# Patient Record
Sex: FEMALE | Race: WHITE | NOT HISPANIC OR LATINO | Employment: UNEMPLOYED | URBAN - METROPOLITAN AREA
[De-identification: names, ages, dates, MRNs, and addresses within clinical notes are randomized per-mention and may not be internally consistent; named-entity substitution may affect disease eponyms.]

---

## 2017-01-24 ENCOUNTER — PATIENT MESSAGE (OUTPATIENT)
Dept: FAMILY MEDICINE | Facility: CLINIC | Age: 37
End: 2017-01-24

## 2017-01-25 RX ORDER — OSELTAMIVIR PHOSPHATE 75 MG/1
75 CAPSULE ORAL DAILY
Qty: 10 CAPSULE | Refills: 0 | Status: SHIPPED | OUTPATIENT
Start: 2017-01-25 | End: 2017-02-04

## 2017-02-01 ENCOUNTER — LAB VISIT (OUTPATIENT)
Dept: LAB | Facility: HOSPITAL | Age: 37
End: 2017-02-01
Attending: INTERNAL MEDICINE
Payer: MEDICAID

## 2017-02-01 ENCOUNTER — OFFICE VISIT (OUTPATIENT)
Dept: RHEUMATOLOGY | Facility: CLINIC | Age: 37
End: 2017-02-01
Payer: MEDICAID

## 2017-02-01 VITALS
HEIGHT: 62 IN | HEART RATE: 64 BPM | DIASTOLIC BLOOD PRESSURE: 70 MMHG | WEIGHT: 97.88 LBS | BODY MASS INDEX: 18.01 KG/M2 | SYSTOLIC BLOOD PRESSURE: 100 MMHG

## 2017-02-01 DIAGNOSIS — E55.9 VITAMIN D DEFICIENCY: ICD-10-CM

## 2017-02-01 DIAGNOSIS — G93.32 CHRONIC FATIGUE SYNDROME: ICD-10-CM

## 2017-02-01 DIAGNOSIS — M79.7 FIBROMYALGIA: Primary | ICD-10-CM

## 2017-02-01 DIAGNOSIS — F32.A DEPRESSION, UNSPECIFIED DEPRESSION TYPE: ICD-10-CM

## 2017-02-01 LAB — 25(OH)D3+25(OH)D2 SERPL-MCNC: 32 NG/ML

## 2017-02-01 PROCEDURE — 99213 OFFICE O/P EST LOW 20 MIN: CPT | Mod: PBBFAC,PO | Performed by: INTERNAL MEDICINE

## 2017-02-01 PROCEDURE — 99214 OFFICE O/P EST MOD 30 MIN: CPT | Mod: S$PBB,,, | Performed by: INTERNAL MEDICINE

## 2017-02-01 PROCEDURE — 82306 VITAMIN D 25 HYDROXY: CPT

## 2017-02-01 PROCEDURE — 36415 COLL VENOUS BLD VENIPUNCTURE: CPT | Mod: PO

## 2017-02-01 PROCEDURE — 99999 PR PBB SHADOW E&M-EST. PATIENT-LVL III: CPT | Mod: PBBFAC,,, | Performed by: INTERNAL MEDICINE

## 2017-02-01 RX ORDER — ALPRAZOLAM 0.5 MG/1
TABLET ORAL 2 TIMES DAILY PRN
COMMUNITY
Start: 2017-01-31 | End: 2019-07-17

## 2017-02-01 RX ORDER — DEXTROAMPHETAMINE SACCHARATE, AMPHETAMINE ASPARTATE MONOHYDRATE, DEXTROAMPHETAMINE SULFATE AND AMPHETAMINE SULFATE 7.5; 7.5; 7.5; 7.5 MG/1; MG/1; MG/1; MG/1
30 CAPSULE, EXTENDED RELEASE ORAL EVERY MORNING
COMMUNITY
End: 2021-02-25

## 2017-02-01 RX ORDER — CLONAZEPAM 2 MG/1
TABLET ORAL NIGHTLY
COMMUNITY
Start: 2017-01-31 | End: 2018-04-23

## 2017-02-01 RX ORDER — CHOLECALCIFEROL (VITAMIN D3) 25 MCG
185 TABLET ORAL DAILY
COMMUNITY
End: 2017-12-12

## 2017-02-01 RX ORDER — METHOCARBAMOL 500 MG/1
500 TABLET, FILM COATED ORAL 2 TIMES DAILY PRN
Qty: 60 TABLET | Refills: 11 | Status: SHIPPED | OUTPATIENT
Start: 2017-02-01 | End: 2017-04-13

## 2017-02-01 RX ORDER — KETOROLAC TROMETHAMINE 10 MG/1
10 TABLET, FILM COATED ORAL DAILY PRN
Qty: 5 TABLET | Refills: 11 | Status: SHIPPED | OUTPATIENT
Start: 2017-02-01 | End: 2018-02-22 | Stop reason: SDUPTHER

## 2017-02-01 ASSESSMENT — ROUTINE ASSESSMENT OF PATIENT INDEX DATA (RAPID3)
PAIN SCORE: 7.5
TOTAL RAPID3 SCORE: 6.05
PATIENT GLOBAL ASSESSMENT SCORE: 7
MDHAQ FUNCTION SCORE: 1.1
PSYCHOLOGICAL DISTRESS SCORE: 4.4

## 2017-02-01 NOTE — PROGRESS NOTES
"Subjective:          Chief Complaint: Dorothy Jesus is a 36 y.o. female who had concerns including Disease Management.    HPI:  Patient having flare in past few months with incresased pain in upper trapezius and c-spine, fingers in pain. Hurts and exhausted just taking a shower. Too painful to make food.   Trouble with sleep secondary to pain. Sleeps ok once she is asleep, but not sleeping until 0200. Stiff when she gets up.       Tizanidine does take the "edge" off her myalgias. Not typically using every day, but as of late. On a good day 30% help, on average 10%,Ketoprofen not helping as much for aches as once did.       She has increased her activity level trying to eat healthier.  In the past she states she was getting some relief of myalgias and arthralgias on ketoprofen.  He completed the vitamin D 12 weeks her psychiatrist has recently adjusted her medications slightly and this seems to be helping a great deal.  Interestingly she noted with the adjustment in her ADHD meds her myalgias have improved.Patient is a 34 y/o female referred for +LENA  1:80 homogeneous with repeat serologies all negative with myalgias, brain fog. Present for few years worse in past 2 years, affecting daily living/function. Patient did suffer from worsening of her symptoms after trying gabapentin, she did increase dose as we discussed but this only exacerbated her complaints. She did have worseing depression through the winter. Recent job did not work out for her, which was frustrating.  did recently get full time job.    Exacerbates with menstrual cycles. Patient has a non-restorative sleep pattern, no snoring. She has had sleep study-[place] negative for narcoplepsy. Patient was given Provigil exacerbated anxiety. Ibuprofen 600mg no help. APAP no help, naproxen no help.     Patient was on lamictal for nearly 8 years d/c'd for suspected myalgias. Recently (few weeks ago) trialed lithium did not tolerate. Zyprexa, " "Geodon, topamax and risperdol. Wellbutrin has been the best medication for her as she continues to feel "human". She is having more myalgias, and some arthralgias some good days some bad days. She cannot see a pattern of morning stiffness.       REVIEW OF SYSTEMS:    Review of Systems   Constitutional: Positive for malaise/fatigue. Negative for fever and weight loss.   HENT: Negative for sore throat.    Eyes: Negative for double vision, photophobia and redness.   Respiratory: Negative for cough, shortness of breath and wheezing.    Cardiovascular: Negative for chest pain, palpitations and orthopnea.   Gastrointestinal: Negative for abdominal pain, constipation and diarrhea.   Genitourinary: Negative for dysuria, hematuria and urgency.   Musculoskeletal: Positive for joint pain and neck pain. Negative for back pain and myalgias.   Skin: Negative for rash.   Neurological: Negative for dizziness, tingling, focal weakness and headaches.   Endo/Heme/Allergies: Does not bruise/bleed easily.   Psychiatric/Behavioral: Negative for depression, hallucinations and suicidal ideas.               Objective:            Past Medical History   Diagnosis Date    Anxiety     Bartholin's cyst     Bipolar 2 disorder     Chronic fatigue     Osteopenia      History reviewed. No pertinent family history.  Social History   Substance Use Topics    Smoking status: Never Smoker    Smokeless tobacco: None    Alcohol use No      Comment: rare         Current Outpatient Prescriptions on File Prior to Visit   Medication Sig Dispense Refill    alprazolam (XANAX) 0.5 MG tablet Take 0.5 mg by mouth daily as needed for Anxiety.      buPROPion (WELLBUTRIN XL) 300 MG 24 hr tablet Take 150 mg by mouth once daily.       dextroamphetamine-amphetamine (ADDERALL) 20 mg tablet Take 1 tablet by mouth 2 (two) times daily. Takes at one o'clock in afternoon. Takes 30 mg in morning.      ketoprofen (ORUDIS) 50 MG capsule Take 1 capsule (50 mg total) by " mouth 2 (two) times daily as needed for Pain. 60 capsule 6    [DISCONTINUED] clonazepam (KLONOPIN) 1 MG tablet Take 1 tablet (1 mg total) by mouth 2 (two) times daily as needed. (Patient taking differently: Take 2 mg by mouth every evening. ) 60 tablet 3    cyanocobalamin (VITAMIN B-12) 1,000 mcg/mL injection Inject 1 mL (1,000 mcg total) into the muscle every 28 days. 12 mL 0    ibuprofen (ADVIL,MOTRIN) 200 MG tablet Take 200 mg by mouth every 6 (six) hours as needed for Pain.      magnesium oxide (MAG-OX) 400 mg tablet Take 400 mg by mouth once daily.      oseltamivir (TAMIFLU) 75 MG capsule Take 1 capsule (75 mg total) by mouth once daily. 10 capsule 0    syringe with needle 1 mL 27 gauge Syrg For B12 injections 12 Syringe 2    tizanidine (ZANAFLEX) 2 MG tablet Take 2 tablets (4 mg total) by mouth every 6 (six) hours as needed. 1-2 tablets by mouth every 6 hours. 60 tablet 5     No current facility-administered medications on file prior to visit.        Vitals:    02/01/17 1409   BP: 100/70   Pulse: 64       Physical Exam:    Physical Exam   Constitutional: She appears well-developed and well-nourished.   HENT:   Nose: No septal deviation.   Mouth/Throat: Mucous membranes are normal. No oral lesions.   Eyes: Pupils are equal, round, and reactive to light. Right conjunctiva is not injected. Left conjunctiva is not injected.   Neck: No JVD present. No thyroid mass and no thyromegaly present.   Cardiovascular: Normal rate, regular rhythm and normal pulses.    No edema   Pulmonary/Chest: Effort normal and breath sounds normal.   Abdominal: Soft. Normal appearance. There is no hepatosplenomegaly.   Musculoskeletal:        Right shoulder: She exhibits normal range of motion, no tenderness and no swelling.        Left shoulder: She exhibits normal range of motion, no tenderness and no swelling.        Right elbow: She exhibits normal range of motion and no swelling. No tenderness found.        Left elbow: She  exhibits normal range of motion and no swelling. No tenderness found.        Right wrist: She exhibits normal range of motion, no tenderness and no swelling.        Left wrist: She exhibits normal range of motion, no tenderness and no swelling.        Right hip: She exhibits normal range of motion, normal strength and no swelling.        Left hip: She exhibits normal range of motion, no tenderness and no swelling.        Right knee: She exhibits normal range of motion and no swelling. No tenderness found.        Left knee: She exhibits normal range of motion and no swelling. No tenderness found.        Right ankle: She exhibits normal range of motion and no swelling. No tenderness.        Left ankle: She exhibits normal range of motion and no swelling. No tenderness.   No synovitis of the MCP, PIP, DIP or wrist    18/18 FMS tender points   Lymphadenopathy:     She has no cervical adenopathy.     She has no axillary adenopathy.   Neurological: She has normal strength and normal reflexes.   Skin: Skin is dry and intact.   Psychiatric: She has a normal mood and affect.             Assessment:       Encounter Diagnoses   Name Primary?    Fibromyalgia Yes    Vitamin D deficiency     Chronic fatigue syndrome     Depression, unspecified depression type           Plan:        Fibromyalgia  -     ketorolac (TORADOL) 10 mg tablet; Take 1 tablet (10 mg total) by mouth daily as needed for Pain.  Dispense: 5 tablet; Refill: 11  -     methocarbamol (ROBAXIN) 500 MG Tab; Take 1 tablet (500 mg total) by mouth 2 (two) times daily as needed.  Dispense: 60 tablet; Refill: 11    Vitamin D deficiency  -     Vitamin D; Future; Expected date: 2/1/17    Chronic fatigue syndrome    Depression, unspecified depression type    Trial with changing Tizanidine to Robaxin PRN  Also want to see if Toradol PRN extreme flares offers any benefit.   INcreased psychological stressors seem to be affecting overall physical functions, patients  continues to remain as active as possible.   Check vitamin D.   Return in about 3 months (around 5/1/2017).          30min consultation with greater than 50% spent in counseling, chart review and coordination of care. All questions answered.

## 2017-02-01 NOTE — MR AVS SNAPSHOT
[facility]  [address] [facility] [address]  [facility] [address]  Phone: [phone]  Fax: [phone]                  Dorothy Jesus   2017 2:00 PM   Office Visit    Description:  Female : 1980   Provider:  Glory St DO   Department:  [facility]           Reason for Visit     Disease Management           Diagnoses this Visit        Comments    Fibromyalgia    -  Primary     Vitamin D deficiency         Chronic fatigue syndrome         Depression, unspecified depression type                To Do List           Future Appointments        Provider Department Dept Phone    2017 3:05 PM LAB, [place] [facility] [phone]    2/3/2017 10:20 AM Tamra Stack MD [facility] - [phone]    2017 9:40 AM HARMEET Nguyen MD [facility] [phone]    2017 2:00 PM Glory St DO [facility] [phone]      Goals (5 Years of Data)     None      Follow-Up and Disposition     Return in about 3 months (around 2017).       These Medications        Disp Refills Start End    ketorolac (TORADOL) 10 mg tablet 5 tablet 11 2017    Take 1 tablet (10 mg total) by mouth daily as needed for Pain. - Oral    [place] [facility] [place] [address] [facility] [address] Ph #: [phone]       methocarbamol (ROBAXIN) 500 MG Tab 60 tablet 11 2017    Take 1 tablet (500 mg total) by mouth 2 (two) times daily as needed. - Oral    [place] [facility] [place] [address] [facility] Ph #: [phone]         [facility] On Call     [facility] On Call Nurse Care Line -  Assistance  Registered nurses in the [facility] provide clinical advisement, health education, appointment booking, and other advisory services.  Call for this free service at [phone].             Medications           Message regarding Medications     Verify the  changes and/or additions to your medication regime listed below are the same as discussed with your clinician today.  If any of these changes or additions are incorrect, please notify your healthcare provider.        START taking these NEW medications        Refills    ketorolac (TORADOL) 10 mg tablet 11    Sig: Take 1 tablet (10 mg total) by mouth daily as needed for Pain.    Class: Normal    Route: Oral    methocarbamol (ROBAXIN) 500 MG Tab 11    Sig: Take 1 tablet (500 mg total) by mouth 2 (two) times daily as needed.    Class: Normal    Route: Oral      STOP taking these medications     FAMVIR 125 mg Tab     ibuprofen (ADVIL,MOTRIN) 200 MG tablet Take 200 mg by mouth every 6 (six) hours as needed for Pain.           Verify that the below list of medications is an accurate representation of the medications you are currently taking.  If none reported, the list may be blank. If incorrect, please contact your healthcare provider. Carry this list with you in case of emergency.           Current Medications     alprazolam (XANAX) 0.5 MG tablet Take 0.5 mg by mouth daily as needed for Anxiety.    alprazolam (XANAX) 1 MG tablet     buPROPion (WELLBUTRIN XL) 300 MG 24 hr tablet Take 150 mg by mouth once daily.     clonazePAM (KLONOPIN) 2 MG Tab     dextroamphetamine-amphetamine (ADDERALL XR) 30 MG 24 hr capsule Take 30 mg by mouth every morning.    dextroamphetamine-amphetamine (ADDERALL) 20 mg tablet Take 1 tablet by mouth 2 (two) times daily. Takes at one o'clock in afternoon. Takes 30 mg in morning.    ketoprofen (ORUDIS) 50 MG capsule Take 1 capsule (50 mg total) by mouth 2 (two) times daily as needed for Pain.    vitamin D 1000 units Tab Take 185 mg by mouth once daily.    cyanocobalamin (VITAMIN B-12) 1,000 mcg/mL injection Inject 1 mL (1,000 mcg total) into the muscle every 28 days.    ketorolac (TORADOL) 10 mg tablet Take 1 tablet (10 mg total) by mouth daily as needed for Pain.    magnesium oxide (MAG-OX) 400 mg  "tablet Take 400 mg by mouth once daily.    methocarbamol (ROBAXIN) 500 MG Tab Take 1 tablet (500 mg total) by mouth 2 (two) times daily as needed.    oseltamivir (TAMIFLU) 75 MG capsule Take 1 capsule (75 mg total) by mouth once daily.    syringe with needle 1 mL 27 gauge Syrg For B12 injections    tizanidine (ZANAFLEX) 2 MG tablet Take 2 tablets (4 mg total) by mouth every 6 (six) hours as needed. 1-2 tablets by mouth every 6 hours.           Clinical Reference Information           Vital Signs - Last Recorded  Most recent update: 2/1/2017  2:13 PM by Pati Pollard LPN    BP Pulse Ht Wt BMI    100/70 64 5' 2" (1.575 m) 44.4 kg (97 lb 14.2 oz) 17.9 kg/m2      Blood Pressure          Most Recent Value    BP  100/70      Allergies as of 2/1/2017     Gabapentin    Lithium Analogues    Nortriptyline    Topamax [Topiramate]      Immunizations Administered on Date of Encounter - 2/1/2017     None      Orders Placed During Today's Visit     Future Labs/Procedures Expected by Expires    Vitamin D  2/1/2017 4/2/2018      "

## 2017-03-14 ENCOUNTER — OFFICE VISIT (OUTPATIENT)
Dept: OBSTETRICS AND GYNECOLOGY | Facility: CLINIC | Age: 37
End: 2017-03-14
Payer: MEDICAID

## 2017-03-14 VITALS — SYSTOLIC BLOOD PRESSURE: 100 MMHG | BODY MASS INDEX: 17.7 KG/M2 | DIASTOLIC BLOOD PRESSURE: 80 MMHG | WEIGHT: 96.81 LBS

## 2017-03-14 DIAGNOSIS — M79.7 FIBROMYALGIA: ICD-10-CM

## 2017-03-14 DIAGNOSIS — F31.81 BIPOLAR 2 DISORDER: ICD-10-CM

## 2017-03-14 DIAGNOSIS — Z01.419 ROUTINE GYNECOLOGICAL EXAMINATION: Primary | ICD-10-CM

## 2017-03-14 DIAGNOSIS — N75.0 BARTHOLIN'S GLAND CYST: ICD-10-CM

## 2017-03-14 DIAGNOSIS — R42 DIZZINESS: ICD-10-CM

## 2017-03-14 DIAGNOSIS — F41.9 ANXIETY: ICD-10-CM

## 2017-03-14 DIAGNOSIS — Z12.4 CERVICAL CANCER SCREENING: ICD-10-CM

## 2017-03-14 DIAGNOSIS — G43.009 MIGRAINE WITHOUT AURA AND WITHOUT STATUS MIGRAINOSUS, NOT INTRACTABLE: ICD-10-CM

## 2017-03-14 PROCEDURE — 88175 CYTOPATH C/V AUTO FLUID REDO: CPT

## 2017-03-14 PROCEDURE — 99999 PR PBB SHADOW E&M-EST. PATIENT-LVL III: CPT | Mod: PBBFAC,,, | Performed by: OBSTETRICS & GYNECOLOGY

## 2017-03-14 PROCEDURE — 99395 PREV VISIT EST AGE 18-39: CPT | Mod: S$PBB,,, | Performed by: OBSTETRICS & GYNECOLOGY

## 2017-03-14 PROCEDURE — 99213 OFFICE O/P EST LOW 20 MIN: CPT | Mod: PBBFAC,PO | Performed by: OBSTETRICS & GYNECOLOGY

## 2017-03-14 RX ORDER — AMITRIPTYLINE HYDROCHLORIDE 10 MG/1
TABLET, FILM COATED ORAL
COMMUNITY
Start: 2017-02-23 | End: 2017-04-13

## 2017-03-14 NOTE — PROGRESS NOTES
Chief Complaint   Patient presents with    Well Woman     Pap    Dizziness     for the last three months    Shoulder Pain       History of Present Illness: Dorothy Jesus is a 36 y.o. female that presents today 3/14/2017 for well gyn visit. She reports regular periods with 3 days of bleeding with pad changes three times daily.     Past Medical History:   Diagnosis Date    Anxiety     Bartholin's cyst     Bipolar 2 disorder     Chronic fatigue     Osteopenia        Past Surgical History:   Procedure Laterality Date    bartholin's gland cyst marsupialization  2009    LEFT    bartholin's gland surgery  2013    RIGHT with artery bleeding    TUBAL LIGATION         Current Outpatient Prescriptions   Medication Sig Dispense Refill    alprazolam (XANAX) 1 MG tablet       amitriptyline (ELAVIL) 10 MG tablet       buPROPion (WELLBUTRIN XL) 300 MG 24 hr tablet Take 150 mg by mouth once daily.       clonazePAM (KLONOPIN) 2 MG Tab       dextroamphetamine-amphetamine (ADDERALL XR) 30 MG 24 hr capsule Take 30 mg by mouth every morning.      dextroamphetamine-amphetamine (ADDERALL) 20 mg tablet Take 1 tablet by mouth 2 (two) times daily. Takes at one o'clock in afternoon. Takes 30 mg in morning.      ketorolac (TORADOL) 10 mg tablet Take 1 tablet (10 mg total) by mouth daily as needed for Pain. 5 tablet 11    vitamin D 1000 units Tab Take 185 mg by mouth once daily.      cyanocobalamin (VITAMIN B-12) 1,000 mcg/mL injection Inject 1 mL (1,000 mcg total) into the muscle every 28 days. 12 mL 0    methocarbamol (ROBAXIN) 500 MG Tab Take 1 tablet (500 mg total) by mouth 2 (two) times daily as needed. 60 tablet 11    syringe with needle 1 mL 27 gauge Syrg For B12 injections 12 Syringe 2     No current facility-administered medications for this visit.        Review of patient's allergies indicates:   Allergen Reactions    Gabapentin Other (See Comments)     Increased pain level    Lithium analogues Other  (See Comments)     Suicidal thoughts    Nortriptyline Other (See Comments)     Paradoxical effect: sleepless, anxious and increased pain levels    Topamax [topiramate] Other (See Comments)     Lost vision       History reviewed. No pertinent family history.    Social History     Social History    Marital status:      Spouse name: N/A    Number of children: N/A    Years of education: N/A     Social History Main Topics    Smoking status: Never Smoker    Smokeless tobacco: None    Alcohol use No      Comment: rare    Drug use: No    Sexual activity: Yes     Partners: Male     Other Topics Concern    None     Social History Narrative       OB History    Para Term  AB SAB TAB Ectopic Multiple Living   1 1        2      # Outcome Date GA Lbr Tae/2nd Weight Sex Delivery Anes PTL Lv   1 Para 09    F CS-Unspec  N Y          Review of Symptoms:  GENERAL: Denies weight gain or weight loss. Feeling well overall. But complains of intermittent dizziness+++  SKIN: Denies rash or lesions.   HEAD: Denies head injury or headache.   NODES: Denies enlarged lymph nodes.   CHEST: Denies chest pain or shortness of breath.   CARDIOVASCULAR: Denies palpitations or left sided chest pain.   ABDOMEN: No abdominal pain, constipation, diarrhea, nausea, vomiting or rectal bleeding.   URINARY: No frequency, dysuria, hematuria, or burning on urination.  HEMATOLOGIC: No easy bruisability or excessive bleeding.   MUSCULOSKELETAL: Denies joint pain or swelling.     /80  Wt 43.9 kg (96 lb 12.5 oz)  LMP 2017  BMI 17.7 kg/m2  Physical Exam:  APPEARANCE: Well nourished, well developed, in no acute distress.  SKIN: Normal skin turgor, no lesions.  NECK: Neck symmetric without masses   RESPIRATORY: Normal respiratory effort with no retractions or use of accessory muscles  CARDIOVASCULAR: Peripheral vascular system with no swelling no varicosities and palpation of pulses normal  LYMPHATIC: No enlargements  of the lymph nodes noted in the neck, axillae, or groin  ABDOMEN: Soft. No tenderness or masses. No hepatosplenomegaly. No hernias.  BREASTS: Symmetrical, no skin changes or visible lesions. No palpable masses, nipple discharge or adenopathy bilaterally.  PELVIC: Normal external female genitalia without lesions. Normal hair distribution. Adequate perineal body, normal urethral meatus. Urethra with no masses.  Bladder nontender. Vagina moist and well rugated without lesions or discharge. Cervix pink and without lesions. No significant cystocele or rectocele. Bimanual exam showed uterus normal size, shape, position, mobile and nontender. Adnexa without masses or tenderness. Urethra and bladder normal. PAP DONE  EXTREMITIES: No clubbing cyanosis or edema.    ASSESSMENT/PLAN:  Routine gynecological examination    Cervical cancer screening  -     Liquid-based pap smear, screening    Anxiety    Dizziness    Bartholin's gland cyst-LEFT reoccurs.     Fibromyalgia    Migraine without aura and without status migrainosus, not intractable    Bipolar 2 disorder          Patient was counseled today on Pap guidelines, recommendation for pelvic exams, mammograms every other year after the age of 40 and annually after the age of 50, Colonoscopy after the age of 50, Dexa Bone Scan and calcium and vitamin D supplementation in menopause and to see her PCP for other health maintenance.   FOLLOW-UP:prn

## 2017-03-14 NOTE — MR AVS SNAPSHOT
[facility] - OB/[facility]  [address] [place] [address]  Phone: [phone]                  Dorothy Jesus   3/14/2017 2:20 PM   Office Visit    Description:  Female : 1980   Provider:  Tamra Stack MD   Department:  [facility] - OB/[facility]           Reason for Visit     Well Woman     Dizziness     Shoulder Pain           Diagnoses this Visit        Comments    Cervical cancer screening    -  Primary            To Do List           Future Appointments        Provider Department Dept Phone    2017 9:40 AM HARMEET Nguyen MD [facility] [phone]    2017 2:00 PM Glory St DO [facility] [phone]      Goals (5 Years of Data)     None      [facility]     [facility] Care Line -  Assistance  Registered nurses in the [facility] provide clinical advisement, health education, appointment booking, and other advisory services.  Call for this free service at [phone].             Medications           Message regarding Medications     Verify the changes and/or additions to your medication regime listed below are the same as discussed with your clinician today.  If any of these changes or additions are incorrect, please notify your healthcare provider.        STOP taking these medications     ketoprofen (ORUDIS) 50 MG capsule Take 1 capsule (50 mg total) by mouth 2 (two) times daily as needed for Pain.    magnesium oxide (MAG-OX) 400 mg tablet Take 400 mg by mouth once daily.    tizanidine (ZANAFLEX) 2 MG tablet Take 2 tablets (4 mg total) by mouth every 6 (six) hours as needed. 1-2 tablets by mouth every 6 hours.           Verify that the below list of medications is an accurate representation of the medications you are currently taking.  If none reported, the list may be blank. If incorrect, please contact your healthcare provider. Carry this list with you in case of emergency.            Current Medications     alprazolam (XANAX) 1 MG tablet     amitriptyline (ELAVIL) 10 MG tablet     buPROPion (WELLBUTRIN XL) 300 MG 24 hr tablet Take 150 mg by mouth once daily.     clonazePAM (KLONOPIN) 2 MG Tab     dextroamphetamine-amphetamine (ADDERALL XR) 30 MG 24 hr capsule Take 30 mg by mouth every morning.    dextroamphetamine-amphetamine (ADDERALL) 20 mg tablet Take 1 tablet by mouth 2 (two) times daily. Takes at one o'clock in afternoon. Takes 30 mg in morning.    ketorolac (TORADOL) 10 mg tablet Take 1 tablet (10 mg total) by mouth daily as needed for Pain.    vitamin D 1000 units Tab Take 185 mg by mouth once daily.    cyanocobalamin (VITAMIN B-12) 1,000 mcg/mL injection Inject 1 mL (1,000 mcg total) into the muscle every 28 days.    methocarbamol (ROBAXIN) 500 MG Tab Take 1 tablet (500 mg total) by mouth 2 (two) times daily as needed.    syringe with needle 1 mL 27 gauge Syrg For B12 injections           Clinical Reference Information           Your Vitals Were     BP Weight Last Period BMI       100/80 43.9 kg (96 lb 12.5 oz) 03/09/2017 17.7 kg/m2       Blood Pressure          Most Recent Value    BP  100/80      Allergies as of 3/14/2017     Gabapentin    Lithium Analogues    Nortriptyline    Topamax [Topiramate]      Immunizations Administered on Date of Encounter - 3/14/2017     None      Orders Placed During Today's Visit      Normal Orders This Visit    Liquid-based pap smear, screening       Language Assistance Services     ATTENTION: Language assistance services are available, free of charge. Please call [phone].      ATENCIÓN: Si habla español, tiene a rodriguez disposición servicios gratuitos de asistencia lingüística. Llame al [phone].     [facility] Ý: N?u b?n nói Ti?ng Vi?t, có các d?ch v? h? tr? ngôn ng? mi?n phí dành cho b?n. G?i s? [phone].         [facility] complies with applicable Federal civil rights laws and does not discriminate on the basis of race, color,  national origin, age, disability, or sex.

## 2017-03-16 ENCOUNTER — INITIAL CONSULT (OUTPATIENT)
Dept: OTOLARYNGOLOGY | Facility: CLINIC | Age: 37
End: 2017-03-16
Payer: MEDICAID

## 2017-03-16 ENCOUNTER — CLINICAL SUPPORT (OUTPATIENT)
Dept: AUDIOLOGY | Facility: CLINIC | Age: 37
End: 2017-03-16
Payer: MEDICAID

## 2017-03-16 VITALS
BODY MASS INDEX: 18.25 KG/M2 | HEIGHT: 62 IN | HEART RATE: 98 BPM | SYSTOLIC BLOOD PRESSURE: 113 MMHG | WEIGHT: 99.19 LBS | DIASTOLIC BLOOD PRESSURE: 76 MMHG

## 2017-03-16 DIAGNOSIS — R42 DISEQUILIBRIUM: Primary | ICD-10-CM

## 2017-03-16 DIAGNOSIS — H90.3 BILATERAL HIGH FREQUENCY SENSORINEURAL HEARING LOSS: ICD-10-CM

## 2017-03-16 DIAGNOSIS — R42 DIZZY: Primary | ICD-10-CM

## 2017-03-16 DIAGNOSIS — G43.909 MIGRAINE WITHOUT STATUS MIGRAINOSUS, NOT INTRACTABLE, UNSPECIFIED MIGRAINE TYPE: ICD-10-CM

## 2017-03-16 PROCEDURE — 99203 OFFICE O/P NEW LOW 30 MIN: CPT | Mod: S$PBB,,, | Performed by: NURSE PRACTITIONER

## 2017-03-16 PROCEDURE — 99213 OFFICE O/P EST LOW 20 MIN: CPT | Mod: PBBFAC,PO | Performed by: NURSE PRACTITIONER

## 2017-03-16 PROCEDURE — 99999 PR PBB SHADOW E&M-EST. PATIENT-LVL III: CPT | Mod: PBBFAC,,, | Performed by: NURSE PRACTITIONER

## 2017-03-16 NOTE — LETTER
March 16, 2017      Tamra Stack MD  [address] [facility]  [address]  [facility] [address]           [place] - ENT  [address] [facility] [address] [place] [address]  Phone: [phone]  Fax: [phone]          Patient: Dorothy Jesus   MR Number: [id number]   YOB: 1980   Date of Visit: 3/16/2017       Dear Dr. Tamra Stack:    Thank you for referring Dorothy Jesus to me for evaluation. Attached you will find relevant portions of my assessment and plan of care.    If you have questions, please do not hesitate to call me. I look forward to following Dorothy Jesus along with you.    Sincerely,    Barb Nam NP    Enclosure  CC:  No Recipients    If you would like to receive this communication electronically, please contact [email or web address] or [phone] to request more information on [employer] access.    For providers and/or their staff who would like to refer a patient to [facility], please contact us through our one-stop-shop provider referral line, [facility], at [phone].    If you feel you have received this communication in error or would no longer like to receive these types of communications, please e-mail [email or web address]

## 2017-04-13 ENCOUNTER — HOSPITAL ENCOUNTER (OUTPATIENT)
Dept: RADIOLOGY | Facility: HOSPITAL | Age: 37
Discharge: HOME | End: 2017-04-13
Attending: FAMILY MEDICINE
Payer: MEDICAID

## 2017-04-13 ENCOUNTER — OFFICE VISIT (OUTPATIENT)
Dept: FAMILY MEDICINE | Facility: CLINIC | Age: 37
End: 2017-04-13
Payer: MEDICAID

## 2017-04-13 ENCOUNTER — OFFICE VISIT (OUTPATIENT)
Dept: ORTHOPEDICS | Facility: CLINIC | Age: 37
End: 2017-04-13
Payer: MEDICAID

## 2017-04-13 VITALS — BODY MASS INDEX: 17.85 KG/M2 | HEIGHT: 62 IN | WEIGHT: 97 LBS

## 2017-04-13 VITALS
OXYGEN SATURATION: 99 % | WEIGHT: 97.25 LBS | SYSTOLIC BLOOD PRESSURE: 99 MMHG | HEIGHT: 62 IN | DIASTOLIC BLOOD PRESSURE: 60 MMHG | HEART RATE: 92 BPM | RESPIRATION RATE: 16 BRPM | BODY MASS INDEX: 17.9 KG/M2

## 2017-04-13 DIAGNOSIS — M25.511 CHRONIC PAIN OF BOTH SHOULDERS: ICD-10-CM

## 2017-04-13 DIAGNOSIS — F31.81 BIPOLAR 2 DISORDER: ICD-10-CM

## 2017-04-13 DIAGNOSIS — M25.511 CHRONIC PAIN OF BOTH SHOULDERS: Primary | ICD-10-CM

## 2017-04-13 DIAGNOSIS — G93.32 CHRONIC FATIGUE SYNDROME: ICD-10-CM

## 2017-04-13 DIAGNOSIS — M25.511 BILATERAL SHOULDER PAIN, UNSPECIFIED CHRONICITY: Primary | ICD-10-CM

## 2017-04-13 DIAGNOSIS — G89.29 CHRONIC PAIN OF BOTH SHOULDERS: Primary | ICD-10-CM

## 2017-04-13 DIAGNOSIS — M25.512 CHRONIC PAIN OF BOTH SHOULDERS: ICD-10-CM

## 2017-04-13 DIAGNOSIS — M25.512 BILATERAL SHOULDER PAIN, UNSPECIFIED CHRONICITY: Primary | ICD-10-CM

## 2017-04-13 DIAGNOSIS — G89.29 CHRONIC PAIN OF BOTH SHOULDERS: ICD-10-CM

## 2017-04-13 DIAGNOSIS — M79.7 FIBROMYALGIA: ICD-10-CM

## 2017-04-13 DIAGNOSIS — G43.909 MIGRAINE WITHOUT STATUS MIGRAINOSUS, NOT INTRACTABLE, UNSPECIFIED MIGRAINE TYPE: ICD-10-CM

## 2017-04-13 DIAGNOSIS — M25.512 CHRONIC PAIN OF BOTH SHOULDERS: Primary | ICD-10-CM

## 2017-04-13 PROCEDURE — 99204 OFFICE O/P NEW MOD 45 MIN: CPT | Mod: S$PBB,,, | Performed by: ORTHOPAEDIC SURGERY

## 2017-04-13 PROCEDURE — 99214 OFFICE O/P EST MOD 30 MIN: CPT | Mod: S$PBB,,, | Performed by: FAMILY MEDICINE

## 2017-04-13 PROCEDURE — 99999 PR PBB SHADOW E&M-EST. PATIENT-LVL III: CPT | Mod: PBBFAC,,, | Performed by: FAMILY MEDICINE

## 2017-04-13 PROCEDURE — 99212 OFFICE O/P EST SF 10 MIN: CPT | Mod: PBBFAC,27,PO | Performed by: ORTHOPAEDIC SURGERY

## 2017-04-13 PROCEDURE — 73030 X-RAY EXAM OF SHOULDER: CPT | Mod: 26,50,, | Performed by: RADIOLOGY

## 2017-04-13 PROCEDURE — 99999 PR PBB SHADOW E&M-EST. PATIENT-LVL II: CPT | Mod: PBBFAC,,, | Performed by: ORTHOPAEDIC SURGERY

## 2017-04-13 RX ORDER — CLONAZEPAM 1 MG/1
TABLET ORAL
COMMUNITY
Start: 2017-03-15 | End: 2017-04-13

## 2017-04-13 RX ORDER — BUTALBITAL, ACETAMINOPHEN AND CAFFEINE 50; 325; 40 MG/1; MG/1; MG/1
1 TABLET ORAL EVERY 6 HOURS PRN
Qty: 30 TABLET | Refills: 1 | Status: SHIPPED | OUTPATIENT
Start: 2017-04-13 | End: 2017-11-21 | Stop reason: SDUPTHER

## 2017-04-13 RX ORDER — MILNACIPRAN HYDROCHLORIDE 50 MG/1
TABLET, FILM COATED ORAL
COMMUNITY
Start: 2017-03-28 | End: 2017-05-08

## 2017-04-13 NOTE — MR AVS SNAPSHOT
[facility]  [address] [facility] [address]  [place] [address]  Phone: [phone]  Fax: [phone]                  Dorothy Jesus   2017 9:40 AM   Office Visit    Description:  Female : 1980   Provider:  HARMEET Nguyen MD   Department:  [facility]           Reason for Visit     Shoulder Pain           Diagnoses this Visit        Comments    Chronic pain of both shoulders    -  Primary     Bipolar 2 disorder         Migraine without status migrainosus, not intractable, unspecified migraine type         Fibromyalgia         Chronic fatigue syndrome                To Do List           Future Appointments        Provider Department Dept Phone    2017 2:00 PM Dante Watts MD [facility] Orthopedics [phone]    2017 2:00 PM [address],  [facility] Rheumatology [phone]    2017 10:40 AM HARMEET Nguyen MD [facility] [phone]      Goals (5 Years of Data)     None      Follow-Up and Disposition     Return in about 3 months (around 2017), or if symptoms worsen or fail to improve.    Follow-up and Disposition History       These Medications        Disp Refills Start End    butalbital-acetaminophen-caffeine -40 mg (FIORICET, ESGIC) -40 mg per tablet 30 tablet 1 2017    Take 1 tablet by mouth every 6 (six) hours as needed for Pain or Headaches. - Oral    Pharmacy: [facility] [address] [address] #: [phone]         [facility]     [facility] Nurse Care Line -  Assistance  Unless otherwise directed by your provider, please contact [facility], our nurse care line that is available for  assistance.     Registered nurses in the [facility] provide: appointment scheduling, clinical advisement, health education, and other advisory services.  Call: [phone] (toll free)               Medications            Message regarding Medications     Verify the changes and/or additions to your medication regime listed below are the same as discussed with your clinician today.  If any of these changes or additions are incorrect, please notify your healthcare provider.        START taking these NEW medications        Refills    butalbital-acetaminophen-caffeine -40 mg (FIORICET, ESGIC) -40 mg per tablet 1    Sig: Take 1 tablet by mouth every 6 (six) hours as needed for Pain or Headaches.    Class: Normal    Route: Oral      STOP taking these medications     amitriptyline (ELAVIL) 10 MG tablet     methocarbamol (ROBAXIN) 500 MG Tab Take 1 tablet (500 mg total) by mouth 2 (two) times daily as needed.           Verify that the below list of medications is an accurate representation of the medications you are currently taking.  If none reported, the list may be blank. If incorrect, please contact your healthcare provider. Carry this list with you in case of emergency.           Current Medications     alprazolam (XANAX) 1 MG tablet     buPROPion (WELLBUTRIN XL) 300 MG 24 hr tablet Take 150 mg by mouth once daily.     clonazePAM (KLONOPIN) 2 MG Tab     dextroamphetamine-amphetamine (ADDERALL XR) 30 MG 24 hr capsule Take 30 mg by mouth every morning.    dextroamphetamine-amphetamine (ADDERALL) 20 mg tablet Take 1 tablet by mouth 2 (two) times daily. Takes at one o'clock in afternoon. Takes 30 mg in morning.    ketorolac (TORADOL) 10 mg tablet Take 1 tablet (10 mg total) by mouth daily as needed for Pain.    syringe with needle 1 mL 27 gauge Syrg For B12 injections    vitamin D 1000 units Tab Take 185 mg by mouth once daily.    butalbital-acetaminophen-caffeine -40 mg (FIORICET, ESGIC) -40 mg per tablet Take 1 tablet by mouth every 6 (six) hours as needed for Pain or Headaches.    cyanocobalamin (VITAMIN B-12) 1,000 mcg/mL injection Inject 1 mL (1,000 mcg total) into the muscle every 28 days.    SAVELLA 50 mg  "Tab            Clinical Reference Information           Your Vitals Were     BP Pulse Resp Height Weight Last Period    99/60 (BP Location: Left arm, Patient Position: Sitting, BP Method: Manual) 92 16 5' 2" (1.575 m) 44.1 kg (97 lb 3.6 oz) 03/09/2017    SpO2 BMI             99% 17.78 kg/m2         Blood Pressure          Most Recent Value    BP  99/60      Allergies as of 4/13/2017     Gabapentin    Lithium Analogues    Nortriptyline    Topamax [Topiramate]      Immunizations Administered on Date of Encounter - 4/13/2017     None      Orders Placed During Today's Visit      Normal Orders This Visit    Ambulatory referral to Orthopedics     Future Labs/Procedures Expected by Expires    LENA  4/13/2017 6/12/2018    CBC auto differential  4/13/2017 10/10/2017    Comprehensive metabolic panel  4/13/2017 10/10/2017    Lipid panel  4/13/2017 10/10/2017    TSH  4/13/2017 10/10/2017    X-Ray Shoulder Trauma 3 View Bilateral  4/13/2017 4/14/2018      Language Assistance Services     ATTENTION: Language assistance services are available, free of charge. Please call [phone].      ATENCIÓN: Si habla español, tiene a rodriguez disposición servicios gratuitos de asistencia lingüística. Llame al [phone].     CHÚ Ý: N?u b?n nói Ti?ng Vi?t, có các d?ch v? h? tr? ngôn ng? mi?n phí dành cho b?n. G?i s? [phone].         [facility] complies with applicable Federal civil rights laws and does not discriminate on the basis of race, color, national origin, age, disability, or sex.        "

## 2017-04-13 NOTE — PROGRESS NOTES
Subjective:       Patient ID: Dorothy Jesus is a 36 y.o. female.    Chief Complaint: Shoulder Pain (mostly right shoulder)    HPI Comments: Bilateral shoulder pain for over 10 years.  Thinks its different than typical fibromyalgia pain.  Has not started savella yet for fibro.    Shoulder Pain    Pertinent negatives include no fever.     Review of Systems   Constitutional: Negative for chills, fatigue and fever.   Respiratory: Negative for cough, chest tightness and shortness of breath.    Cardiovascular: Negative for chest pain, palpitations and leg swelling.   Gastrointestinal: Negative for abdominal distention and abdominal pain.   Endocrine: Negative for cold intolerance and heat intolerance.   Musculoskeletal: Positive for arthralgias.   Skin: Negative for rash and wound.   Psychiatric/Behavioral: Negative for dysphoric mood.       Objective:      Physical Exam   Constitutional: She appears well-developed and well-nourished.   HENT:   Head: Normocephalic and atraumatic.   Cardiovascular: Normal rate, regular rhythm and normal heart sounds.    Pulmonary/Chest: Effort normal and breath sounds normal.   Musculoskeletal:   Pain with lifting right shoulder   Psychiatric: She has a normal mood and affect.   Nursing note and vitals reviewed.      Assessment:       1. Chronic pain of both shoulders    2. Bipolar 2 disorder    3. Migraine without status migrainosus, not intractable, unspecified migraine type    4. Fibromyalgia    5. Chronic fatigue syndrome        Plan:       Chronic pain of both shoulders  -     X-Ray Shoulder Trauma 3 View Bilateral; Future; Expected date: 4/13/17  -     Ambulatory referral to Orthopedics  -     CBC auto differential; Future; Expected date: 4/13/17  -     Comprehensive metabolic panel; Future; Expected date: 4/13/17  -     Lipid panel; Future; Expected date: 4/13/17  -     TSH; Future; Expected date: 4/13/17  -     LENA; Future; Expected date: 4/13/17    Bipolar 2 disorder  -      CBC auto differential; Future; Expected date: 4/13/17  -     Comprehensive metabolic panel; Future; Expected date: 4/13/17  -     Lipid panel; Future; Expected date: 4/13/17  -     TSH; Future; Expected date: 4/13/17  -     LENA; Future; Expected date: 4/13/17    Migraine without status migrainosus, not intractable, unspecified migraine type  -     CBC auto differential; Future; Expected date: 4/13/17  -     Comprehensive metabolic panel; Future; Expected date: 4/13/17  -     Lipid panel; Future; Expected date: 4/13/17  -     TSH; Future; Expected date: 4/13/17  -     LENA; Future; Expected date: 4/13/17    Fibromyalgia  -     CBC auto differential; Future; Expected date: 4/13/17  -     Comprehensive metabolic panel; Future; Expected date: 4/13/17  -     Lipid panel; Future; Expected date: 4/13/17  -     TSH; Future; Expected date: 4/13/17  -     LENA; Future; Expected date: 4/13/17    Chronic fatigue syndrome  -     CBC auto differential; Future; Expected date: 4/13/17  -     Comprehensive metabolic panel; Future; Expected date: 4/13/17  -     Lipid panel; Future; Expected date: 4/13/17  -     TSH; Future; Expected date: 4/13/17  -     LENA; Future; Expected date: 4/13/17    Other orders  -     butalbital-acetaminophen-caffeine -40 mg (FIORICET, ESGIC) -40 mg per tablet; Take 1 tablet by mouth every 6 (six) hours as needed for Pain or Headaches.  Dispense: 30 tablet; Refill: 1      Trial of med for migraines.  To ortho due to chronic nature of shoulder pain.  Can try savella she was previously given.  Return in about 3 months (around 7/13/2017), or if symptoms worsen or fail to improve.

## 2017-04-13 NOTE — PROGRESS NOTES
Dorothy Jesus, 36 years old, seen as a consult from Dr. Nguyen.  Communication   via EPIC, complaining of right greater than left shoulder pain.  Says she has   this now for she says most of her life.  No trauma.  4/10 on good days, 9/10 on   bad days, points to the parascapular region, trapezial area as the location of   her pain.  Pain seems to come and go.    Exam today shows cervical motion is uncomfortable, but is not inhibited.  She   has full motion of the shoulders, but reports discomfort in the trapezial and   parascapular region with motion.  Cuff strength is intact.  No instability.    Hand is functioning well.  No deficits detected.    X-rays look good.    ASSESSMENT:  Bilateral parascapular pain.    PLAN:  Massage therapy.  Physical therapy, modalities, no orthopedic surgery   needed.  Follow up in our clinic as needed.      PBB/PN  dd: 04/13/2017 14:49:42 (CDT)  td: 04/14/2017 00:32:23 (CDT)  Doc ID   #[id number]  Job ID #[id number]    CC:     Further History  Aching pain  Worse with activity  Relieved with rest  No other associated symptoms  No other radiation    Further Exam  Alert and oriented  Pleasant  Contralateral limb has appropriate range of motion for age and condition  Contralateral limb has appropriate strength for age and condition  Contralateral limb has appropriate stability  for age and condition  No adenopathy  Pulses are appropriate for current condition  Skin is intact        Chief Complaint    Chief Complaint   Patient presents with    Shoulder Pain     bilat       HPI  Jarenmaria m Jesus is a 36 y.o.  female who presents with       Past Medical History  Past Medical History:   Diagnosis Date    Anxiety     Bartholin's cyst     Bipolar 2 disorder     Chronic fatigue     Osteopenia        Past Surgical History  Past Surgical History:   Procedure Laterality Date    bartholin's gland cyst marsupialization  2009    LEFT    bartholin's gland surgery  2013    RIGHT with artery  bleeding    TUBAL LIGATION         Medications  Current Outpatient Prescriptions   Medication Sig    alprazolam (XANAX) 1 MG tablet     buPROPion (WELLBUTRIN XL) 300 MG 24 hr tablet Take 150 mg by mouth once daily.     butalbital-acetaminophen-caffeine -40 mg (FIORICET, ESGIC) -40 mg per tablet Take 1 tablet by mouth every 6 (six) hours as needed for Pain or Headaches.    clonazePAM (KLONOPIN) 2 MG Tab     cyanocobalamin (VITAMIN B-12) 1,000 mcg/mL injection Inject 1 mL (1,000 mcg total) into the muscle every 28 days.    dextroamphetamine-amphetamine (ADDERALL XR) 30 MG 24 hr capsule Take 30 mg by mouth every morning.    dextroamphetamine-amphetamine (ADDERALL) 20 mg tablet Take 1 tablet by mouth 2 (two) times daily. Takes at one o'clock in afternoon. Takes 30 mg in morning.    ketorolac (TORADOL) 10 mg tablet Take 1 tablet (10 mg total) by mouth daily as needed for Pain.    SAVELLA 50 mg Tab     syringe with needle 1 mL 27 gauge Syrg For B12 injections    vitamin D 1000 units Tab Take 185 mg by mouth once daily.     No current facility-administered medications for this visit.        Allergies  Review of patient's allergies indicates:   Allergen Reactions    Gabapentin Other (See Comments)     Increased pain level    Lithium analogues Other (See Comments)     Suicidal thoughts    Nortriptyline Other (See Comments)     Paradoxical effect: sleepless, anxious and increased pain levels    Topamax [topiramate] Other (See Comments)     Lost vision       Family History  No family history on file.    Social History  Social History     Social History    Marital status:      Spouse name: N/A    Number of children: N/A    Years of education: N/A     Occupational History    Not on file.     Social History Main Topics    Smoking status: Never Smoker    Smokeless tobacco: Not on file    Alcohol use No      Comment: rare    Drug use: No    Sexual activity: Yes     Partners: Male      Other Topics Concern    Not on file     Social History Narrative               Review of Systems     Constitutional: Negative    HENT: Negative  Eyes: Negative  Respiratory: Negative  Cardiovascular: Negative  Musculoskeletal: HPI  Skin: Negative  Neurological: Negative  Hematological: Negative  Endocrine: Negative                 Physical Exam    There were no vitals filed for this visit.  Body mass index is 17.74 kg/(m^2).  Physical Examination:     General appearance -  well appearing, and in no distress  Mental status - awake  Neck - supple  Chest -  symmetric air entry  Heart - normal rate   Abdomen - soft      Assessment     1. Bilateral shoulder pain, unspecified chronicity    2. Fibromyalgia    3. Chronic fatigue syndrome    4. Bipolar 2 disorder          Plan

## 2017-04-13 NOTE — LETTER
April 13, 2017      HARMEET Nguyen MD  [address] [facility] [address] [place] [address]           [facility]  [address] [facility] [address] [place] [address]  Phone: [phone]          Patient: Dorothy Jesus   MR Number: [id number]   YOB: 1980   Date of Visit: 4/13/2017       Dear Dr. HARMEET Nguyen:    Thank you for referring Dorothy Jesus to me for evaluation. Attached you will find relevant portions of my assessment and plan of care.    If you have questions, please do not hesitate to call me. I look forward to following Dorothy Jesus along with you.    Sincerely,    Dante Watts MD    Enclosure  CC:  No Recipients    If you would like to receive this communication electronically, please contact [email or web address] or [phone] to request more information on [employer] Link access.    For providers and/or their staff who would like to refer a patient to [facility], please contact us through our one-stop-shop provider referral line, [facility], at [phone].    If you feel you have received this communication in error or would no longer like to receive these types of communications, please e-mail [email or web address]

## 2017-04-21 ENCOUNTER — TELEPHONE (OUTPATIENT)
Dept: FAMILY MEDICINE | Facility: CLINIC | Age: 37
End: 2017-04-21

## 2017-04-21 NOTE — TELEPHONE ENCOUNTER
----- Message from Jamar Tijerina sent at 4/21/2017  2:16 PM CDT -----  Contact: Ctr[facility]- [phone]  Patient returning the nurse phone call.Thanks!

## 2017-04-24 ENCOUNTER — OFFICE VISIT (OUTPATIENT)
Dept: RHEUMATOLOGY | Facility: CLINIC | Age: 37
End: 2017-04-24
Payer: MEDICAID

## 2017-04-24 ENCOUNTER — LAB VISIT (OUTPATIENT)
Dept: LAB | Facility: HOSPITAL | Age: 37
End: 2017-04-24
Attending: INTERNAL MEDICINE
Payer: MEDICAID

## 2017-04-24 VITALS
SYSTOLIC BLOOD PRESSURE: 100 MMHG | HEIGHT: 62 IN | DIASTOLIC BLOOD PRESSURE: 60 MMHG | WEIGHT: 100.5 LBS | HEART RATE: 60 BPM | BODY MASS INDEX: 18.5 KG/M2

## 2017-04-24 DIAGNOSIS — R76.8 ANA POSITIVE: ICD-10-CM

## 2017-04-24 DIAGNOSIS — E53.8 B12 DEFICIENCY: ICD-10-CM

## 2017-04-24 DIAGNOSIS — G43.909 MIGRAINE WITHOUT STATUS MIGRAINOSUS, NOT INTRACTABLE, UNSPECIFIED MIGRAINE TYPE: ICD-10-CM

## 2017-04-24 DIAGNOSIS — F31.81 BIPOLAR 2 DISORDER: ICD-10-CM

## 2017-04-24 DIAGNOSIS — G93.32 CHRONIC FATIGUE SYNDROME: Primary | ICD-10-CM

## 2017-04-24 DIAGNOSIS — M79.7 FIBROMYALGIA: ICD-10-CM

## 2017-04-24 LAB
C3 SERPL-MCNC: 121 MG/DL
C4 SERPL-MCNC: 29 MG/DL
CRP SERPL-MCNC: 10 MG/L
ERYTHROCYTE [SEDIMENTATION RATE] IN BLOOD BY WESTERGREN METHOD: 5 MM/HR
THYROGLOB AB SERPL IA-ACNC: <4 IU/ML
THYROPEROXIDASE IGG SERPL-ACNC: <6 IU/ML

## 2017-04-24 PROCEDURE — 86160 COMPLEMENT ANTIGEN: CPT | Mod: 59

## 2017-04-24 PROCEDURE — 99214 OFFICE O/P EST MOD 30 MIN: CPT | Mod: S$PBB,,, | Performed by: INTERNAL MEDICINE

## 2017-04-24 PROCEDURE — 36415 COLL VENOUS BLD VENIPUNCTURE: CPT | Mod: PO

## 2017-04-24 PROCEDURE — 86800 THYROGLOBULIN ANTIBODY: CPT

## 2017-04-24 PROCEDURE — 86140 C-REACTIVE PROTEIN: CPT

## 2017-04-24 PROCEDURE — 85651 RBC SED RATE NONAUTOMATED: CPT | Mod: PO

## 2017-04-24 PROCEDURE — 99999 PR PBB SHADOW E&M-EST. PATIENT-LVL III: CPT | Mod: PBBFAC,,, | Performed by: INTERNAL MEDICINE

## 2017-04-24 PROCEDURE — 86235 NUCLEAR ANTIGEN ANTIBODY: CPT

## 2017-04-24 PROCEDURE — 86160 COMPLEMENT ANTIGEN: CPT

## 2017-04-24 PROCEDURE — 86376 MICROSOMAL ANTIBODY EACH: CPT

## 2017-04-24 PROCEDURE — 96372 THER/PROPH/DIAG INJ SC/IM: CPT | Mod: PBBFAC,PO

## 2017-04-24 PROCEDURE — 99213 OFFICE O/P EST LOW 20 MIN: CPT | Mod: PBBFAC,PO | Performed by: INTERNAL MEDICINE

## 2017-04-24 RX ORDER — METHYLPREDNISOLONE 4 MG/1
TABLET ORAL
Qty: 1 PACKAGE | Refills: 0 | Status: SHIPPED | OUTPATIENT
Start: 2017-04-24 | End: 2017-05-05 | Stop reason: ALTCHOICE

## 2017-04-24 RX ORDER — CYANOCOBALAMIN 1000 UG/ML
1000 INJECTION, SOLUTION INTRAMUSCULAR; SUBCUTANEOUS
Status: SHIPPED | OUTPATIENT
Start: 2017-04-24 | End: 2017-10-21

## 2017-04-24 RX ORDER — TIZANIDINE 2 MG/1
4 TABLET ORAL EVERY 6 HOURS PRN
COMMUNITY
End: 2017-12-12

## 2017-04-24 RX ADMIN — CYANOCOBALAMIN 1000 MCG: 1000 INJECTION INTRAMUSCULAR; SUBCUTANEOUS at 11:04

## 2017-04-24 NOTE — PROGRESS NOTES
"Subjective:          Chief Complaint: oDrothy Jesus is a 36 y.o. female who had concerns including Disease Management.    HPI:  Patient having flare in past few months with incresased pain in upper trapezius and c-spine, fingers in pain. Hurts and exhausted just taking a shower. Too painful to make food.   Trouble with sleep secondary to pain. Sleeps ok once she is asleep, but not sleeping until 0200. Stiff when she gets up.       Tizanidine does take the "edge" off her myalgias.  We did try Robaxin this offered no help.  Not typically using every day, but as of late. On a good day 30% help, on average 10%,Ketoprofen not helping as much for aches as once did.  Valentina was recently written by   Toradol PRN does help for bad days  Having HA and dizzyness recently did get fiorocet PRN migraines.      She has increased her activity level trying to eat healthier.  In the past she states she was getting some relief of myalgias and arthralgias on ketoprofen.  He completed the vitamin D 12 weeks her psychiatrist has recently adjusted her medications slightly and this seems to be helping a great deal.  Interestingly she noted with the adjustment in her ADHD meds her myalgias have improved.Patient is a 34 y/o female referred for +LENA  1:80 homogeneous with repeat serologies all negative with myalgias, brain fog. Present for few years worse in past 2 years, affecting daily living/function. Patient did suffer from worsening of her symptoms after trying gabapentin, she did increase dose as we discussed but this only exacerbated her complaints. She did have worseing depression through the winter. Recent job did not work out for her, which was frustrating.  did recently get full time job.    Exacerbates with menstrual cycles. Patient has a non-restorative sleep pattern, no snoring. She has had sleep study-[place] negative for narcoplepsy. Patient was given Provigil exacerbated anxiety. Ibuprofen 600mg no help. " "APAP no help, naproxen no help.     She has had acute chest pain in past lasting few hours with anterior chest wall pain. She Has associated pleuritic pain.  No DVT. No seizure, no stroke. She has left sided facial numbness that is painful when she is at higher altitudes but no known seizure disorder. She cannot pinpoint any photosensitivity or malar rash. Some redness of face intermittently. Sister UC, she does not have any hx of IBD. +raynaud's hands and feet. No dysphagia/odynophagia.   No known Hashimotos.       Patient was on lamictal for nearly 8 years d/c'd for suspected myalgias. Recently (few weeks ago) trialed lithium did not tolerate. Zyprexa, Geodon, topamax and risperdol. Wellbutrin has been the best medication for her as she continues to feel "human". She is having more myalgias, and some arthralgias some good days some bad days. She cannot see a pattern of morning stiffness.     Component      Latest Ref Rng & Units 4/13/2017 12/10/2015   Anti Sm Antibody      0.00 - 19.99 EU 0.35 0.43   Anti-Sm Interpretation      Negative Negative Negative   Anti-SSA Antibody      0.00 - 19.99 EU 0.97 1.62   Anti-SSA Interpretation      Negative Negative Negative   Anti-SSB Antibody      0.00 - 19.99 EU 0.35 0.00   Anti-SSB Interpretation      Negative Negative Negative   ds DNA Ab      Negative 1:10 Negative 1:10 Negative 1:10   Anti Sm/RNP Antibody      0.00 - 19.99 EU 0.79 0.86   Anti-Sm/RNP Interpretation      Negative Negative Negative   Complement (C-3)      50 - 180 mg/dL  108   Complement (C-4)      11 - 44 mg/dL  27   CPK      20 - 180 U/L  50   LENA Screen      Negative <1:160 Positive (A)    LENA HEP-2 Titer       Positive 1:320 Homogeneous        REVIEW OF SYSTEMS:    Review of Systems   Constitutional: Positive for malaise/fatigue. Negative for fever and weight loss.   HENT: Negative for sore throat.    Eyes: Negative for double vision, photophobia and redness.   Respiratory: Negative for cough, shortness " of breath and wheezing.    Cardiovascular: Negative for chest pain, palpitations and orthopnea.   Gastrointestinal: Negative for abdominal pain, constipation and diarrhea.   Genitourinary: Negative for dysuria, hematuria and urgency.   Musculoskeletal: Positive for joint pain and neck pain. Negative for back pain and myalgias.   Skin: Negative for rash.   Neurological: Negative for dizziness, tingling, focal weakness and headaches.   Endo/Heme/Allergies: Does not bruise/bleed easily.   Psychiatric/Behavioral: Negative for depression, hallucinations and suicidal ideas.               Objective:            Past Medical History:   Diagnosis Date    Anxiety     Bartholin's cyst     Bipolar 2 disorder     Chronic fatigue     Osteopenia      No family history on file.  Social History   Substance Use Topics    Smoking status: Never Smoker    Smokeless tobacco: None    Alcohol use No      Comment: rare         Current Outpatient Prescriptions on File Prior to Visit   Medication Sig Dispense Refill    alprazolam (XANAX) 1 MG tablet       buPROPion (WELLBUTRIN XL) 300 MG 24 hr tablet Take 150 mg by mouth once daily.       butalbital-acetaminophen-caffeine -40 mg (FIORICET, ESGIC) -40 mg per tablet Take 1 tablet by mouth every 6 (six) hours as needed for Pain or Headaches. 30 tablet 1    clonazePAM (KLONOPIN) 2 MG Tab       cyanocobalamin (VITAMIN B-12) 1,000 mcg/mL injection Inject 1 mL (1,000 mcg total) into the muscle every 28 days. 12 mL 0    dextroamphetamine-amphetamine (ADDERALL XR) 30 MG 24 hr capsule Take 30 mg by mouth every morning.      dextroamphetamine-amphetamine (ADDERALL) 20 mg tablet Take 1 tablet by mouth 2 (two) times daily. Takes at one o'clock in afternoon. Takes 30 mg in morning.      ketorolac (TORADOL) 10 mg tablet Take 1 tablet (10 mg total) by mouth daily as needed for Pain. 5 tablet 11    syringe with needle 1 mL 27 gauge Syrg For B12 injections 12 Syringe 2    vitamin D  1000 units Tab Take 185 mg by mouth once daily.      SAVELLA 50 mg Tab        No current facility-administered medications on file prior to visit.        Vitals:    04/24/17 1036   BP: 100/60   Pulse: 60       Physical Exam:    Physical Exam   Constitutional: She appears well-developed and well-nourished.   HENT:   Nose: No septal deviation.   Mouth/Throat: Mucous membranes are normal. No oral lesions.   Eyes: Pupils are equal, round, and reactive to light. Right conjunctiva is not injected. Left conjunctiva is not injected.   Neck: No JVD present. No thyroid mass and no thyromegaly present.   Cardiovascular: Normal rate, regular rhythm and normal pulses.    No edema   Pulmonary/Chest: Effort normal and breath sounds normal.   Abdominal: Soft. Normal appearance. There is no hepatosplenomegaly.   Musculoskeletal:        Right shoulder: She exhibits normal range of motion, no tenderness and no swelling.        Left shoulder: She exhibits normal range of motion, no tenderness and no swelling.        Right elbow: She exhibits normal range of motion and no swelling. No tenderness found.        Left elbow: She exhibits normal range of motion and no swelling. No tenderness found.        Right wrist: She exhibits normal range of motion, no tenderness and no swelling.        Left wrist: She exhibits normal range of motion, no tenderness and no swelling.        Right hip: She exhibits normal range of motion, normal strength and no swelling.        Left hip: She exhibits normal range of motion, no tenderness and no swelling.        Right knee: She exhibits normal range of motion and no swelling. No tenderness found.        Left knee: She exhibits normal range of motion and no swelling. No tenderness found.        Right ankle: She exhibits normal range of motion and no swelling. No tenderness.        Left ankle: She exhibits normal range of motion and no swelling. No tenderness.   No synovitis of the MCP, PIP, DIP or  wrist    18/18 FMS tender points   Lymphadenopathy:     She has no cervical adenopathy.     She has no axillary adenopathy.   Neurological: She has normal strength and normal reflexes.   Skin: Skin is dry and intact.   Psychiatric: She has a normal mood and affect.             Assessment:       Encounter Diagnoses   Name Primary?    Chronic fatigue syndrome Yes    LENA positive     Fibromyalgia     Bipolar 2 disorder     B12 deficiency     Migraine without status migrainosus, not intractable, unspecified migraine type           Plan:        Chronic fatigue syndrome    LENA positive  Comments:  recent increased titier 1:320 homogenous. Patient not meeting criteria for SLE. + sicca ? sjogrens.   Orders:  -     methylPREDNISolone (MEDROL DOSEPACK) 4 mg tablet; use as directed  Dispense: 1 Package; Refill: 0  -     THYROID PEROXIDASE ANTIBODY; Future; Expected date: 4/24/17  -     Anti-thyroglobulin antibody; Future; Expected date: 4/24/17  -     C3 complement; Future; Expected date: 4/24/17  -     C4 complement; Future; Expected date: 4/24/17  -     Sedimentation rate, manual; Future; Expected date: 4/24/17  -     C-reactive protein; Future; Expected date: 4/24/17  -     Anti-scleroderma antibody; Future; Expected date: 4/24/17    Fibromyalgia    Bipolar 2 disorder    B12 deficiency  -     cyanocobalamin injection 1,000 mcg; Inject 1 mL (1,000 mcg total) into the muscle every 30 days.    Migraine without status migrainosus, not intractable, unspecified migraine type    Keep Tizanidine  Continue Toradol PRN extreme flares offers any benefit.   INcreased psychological stressors seem to be affecting overall physical functions, patients continues to remain as active as possible.   Agree with start of Savella.   Trial of Medrol  Labs today for any reason for +LENA  B12 injection having trouble doing this at home. Ok for B12 IM monthly at office.     Patient with +LENA but only Raynaud's, sicca and arthralgias w/o  synovitis. Not meeting criteria for any CTD> ? Sjogrens. But no clinical evidence to suggest SLE>   I suspect much of her arthralgias and other complaints to be associated with FMS with underlying anxiety and depression.     No Follow-up on file.  3 month f/u          30min consultation with greater than 50% spent in counseling, chart review and coordination of care. All questions answered.

## 2017-04-24 NOTE — MR AVS SNAPSHOT
[facility]  [address] [facility] [address]  [facility] [address]  Phone: [phone]  Fax: [phone]                  Dorothy Jesus   2017 10:30 AM   Office Visit    Description:  Female : 1980   Provider:  Glory St DO   Department:  [facility]           Reason for Visit     Disease Management           Diagnoses this Visit        Comments    Chronic fatigue syndrome    -  Primary     LENA positive     recent increased titier 1:320 homogenous. Patient not meeting criteria for SLE. + sicca ? sjogrens.     Fibromyalgia         Bipolar 2 disorder         B12 deficiency         Migraine without status migrainosus, not intractable, unspecified migraine type                To Do List           Future Appointments        Provider Department Dept Phone    2017 12:00 PM [facility] [place] [facility] [phone]    2017 10:40 AM HARMEET Nguyen MD [facility] [phone]    2017 10:30 AM Glory St DO [facility] [phone]      Goals (5 Years of Data)     None       These Medications        Disp Refills Start End    methylPREDNISolone (MEDROL DOSEPACK) 4 mg tablet 1 Package 0 2017     use as directed    Pharmacy: [address] #: [phone]         [facility]     [facility] -  Assistance  Unless otherwise directed by your provider, please contact [facility], our nurse care line that is available for  assistance.     Registered nurses in the [facility] provide: appointment scheduling, clinical advisement, health education, and other advisory services.  Call: [phone] (toll free)               Medications           Message regarding Medications     Verify the changes and/or additions to your medication regime listed below are the same as discussed with your clinician today.  If any  of these changes or additions are incorrect, please notify your healthcare provider.        START taking these NEW medications        Refills    methylPREDNISolone (MEDROL DOSEPACK) 4 mg tablet 0    Sig: use as directed    Class: Normal      These medications were administered today        Dose Freq    cyanocobalamin injection 1,000 mcg 1,000 mcg Every 30 days    Sig: Inject 1 mL (1,000 mcg total) into the muscle every 30 days.    Class: Normal    Route: Intramuscular           Verify that the below list of medications is an accurate representation of the medications you are currently taking.  If none reported, the list may be blank. If incorrect, please contact your healthcare provider. Carry this list with you in case of emergency.           Current Medications     alprazolam (XANAX) 1 MG tablet     buPROPion (WELLBUTRIN XL) 300 MG 24 hr tablet Take 150 mg by mouth once daily.     butalbital-acetaminophen-caffeine -40 mg (FIORICET, ESGIC) -40 mg per tablet Take 1 tablet by mouth every 6 (six) hours as needed for Pain or Headaches.    clonazePAM (KLONOPIN) 2 MG Tab     cyanocobalamin (VITAMIN B-12) 1,000 mcg/mL injection Inject 1 mL (1,000 mcg total) into the muscle every 28 days.    dextroamphetamine-amphetamine (ADDERALL XR) 30 MG 24 hr capsule Take 30 mg by mouth every morning.    dextroamphetamine-amphetamine (ADDERALL) 20 mg tablet Take 1 tablet by mouth 2 (two) times daily. Takes at one o'clock in afternoon. Takes 30 mg in morning.    ketorolac (TORADOL) 10 mg tablet Take 1 tablet (10 mg total) by mouth daily as needed for Pain.    syringe with needle 1 mL 27 gauge Syrg For B12 injections    tizanidine (ZANAFLEX) 2 MG tablet Take 4 mg by mouth every 6 (six) hours as needed.    vitamin D 1000 units Tab Take 185 mg by mouth once daily.    methylPREDNISolone (MEDROL DOSEPACK) 4 mg tablet use as directed    SAVELLA 50 mg Tab            Clinical Reference Information           Your Vitals Were     BP  "Pulse Height Weight BMI    100/60 60 5' 2" (1.575 m) 45.6 kg (100 lb 8.5 oz) 18.39 kg/m2      Blood Pressure          Most Recent Value    BP  100/60      Allergies as of 4/24/2017     Gabapentin    Lithium Analogues    Nortriptyline    Topamax [Topiramate]      Immunizations Administered on Date of Encounter - 4/24/2017     None      Orders Placed During Today's Visit     Future Labs/Procedures Expected by Expires    Anti-scleroderma antibody  4/24/2017 6/23/2018    Anti-thyroglobulin antibody  4/24/2017 6/23/2018    C-reactive protein  4/24/2017 6/23/2018    C3 complement  4/24/2017 4/24/2018    C4 complement  4/24/2017 4/24/2018    Sedimentation rate, manual  4/24/2017 (Approximate) 4/24/2018    THYROID PEROXIDASE ANTIBODY  4/24/2017 6/23/2018      Language Assistance Services     ATTENTION: Language assistance services are available, free of charge. Please call [phone].      ATENCIÓN: Si habla tameka, tiene a rodriguez disposición servicios gratuitos de asistencia lingüística. Llame al [phone].     CHÚ Ý: N?u b?n nói Ti?ng Vi?t, có các d?ch v? h? tr? ngôn ng? mi?n phí dành cho b?n. G?i s? [phone].         [facility] complies with applicable Federal civil rights laws and does not discriminate on the basis of race, color, national origin, age, disability, or sex.        "

## 2017-04-26 LAB — ENA SCL70 AB SER-ACNC: 2 UNITS

## 2017-04-30 ENCOUNTER — PATIENT MESSAGE (OUTPATIENT)
Dept: RHEUMATOLOGY | Facility: CLINIC | Age: 37
End: 2017-04-30

## 2017-05-05 ENCOUNTER — OFFICE VISIT (OUTPATIENT)
Dept: FAMILY MEDICINE | Facility: CLINIC | Age: 37
End: 2017-05-05
Payer: MEDICAID

## 2017-05-05 VITALS
WEIGHT: 97.88 LBS | TEMPERATURE: 99 F | DIASTOLIC BLOOD PRESSURE: 62 MMHG | HEART RATE: 138 BPM | RESPIRATION RATE: 18 BRPM | SYSTOLIC BLOOD PRESSURE: 98 MMHG | BODY MASS INDEX: 18.01 KG/M2 | OXYGEN SATURATION: 98 % | HEIGHT: 62 IN

## 2017-05-05 DIAGNOSIS — R05.9 COUGH: ICD-10-CM

## 2017-05-05 DIAGNOSIS — R00.2 PALPITATIONS: ICD-10-CM

## 2017-05-05 DIAGNOSIS — T50.905A MEDICATION SIDE EFFECT, INITIAL ENCOUNTER: Primary | ICD-10-CM

## 2017-05-05 DIAGNOSIS — R00.0 TACHYCARDIA: ICD-10-CM

## 2017-05-05 DIAGNOSIS — F31.81 BIPOLAR 2 DISORDER: ICD-10-CM

## 2017-05-05 PROCEDURE — 99214 OFFICE O/P EST MOD 30 MIN: CPT | Mod: S$PBB,,, | Performed by: NURSE PRACTITIONER

## 2017-05-05 PROCEDURE — 99999 PR PBB SHADOW E&M-EST. PATIENT-LVL IV: CPT | Mod: PBBFAC,,, | Performed by: NURSE PRACTITIONER

## 2017-05-05 PROCEDURE — 93005 ELECTROCARDIOGRAM TRACING: CPT | Mod: PBBFAC,PO | Performed by: NURSE PRACTITIONER

## 2017-05-05 PROCEDURE — 99214 OFFICE O/P EST MOD 30 MIN: CPT | Mod: PBBFAC,PO,25 | Performed by: NURSE PRACTITIONER

## 2017-05-05 PROCEDURE — 93010 ELECTROCARDIOGRAM REPORT: CPT | Mod: ,,, | Performed by: INTERNAL MEDICINE

## 2017-05-05 RX ORDER — HYDROXYCHLOROQUINE SULFATE 200 MG/1
200 TABLET, FILM COATED ORAL 2 TIMES DAILY
Qty: 60 TABLET | Refills: 6 | Status: SHIPPED | OUTPATIENT
Start: 2017-05-05 | End: 2017-10-23

## 2017-05-05 RX ORDER — PREDNISONE 5 MG/1
5 TABLET ORAL DAILY
Qty: 30 TABLET | Refills: 3 | Status: SHIPPED | OUTPATIENT
Start: 2017-05-05 | End: 2017-06-04

## 2017-05-05 RX ORDER — PROPRANOLOL HYDROCHLORIDE 20 MG/1
20 TABLET ORAL 2 TIMES DAILY PRN
Qty: 10 TABLET | Refills: 0 | Status: SHIPPED | OUTPATIENT
Start: 2017-05-05 | End: 2017-05-08

## 2017-05-05 NOTE — PATIENT INSTRUCTIONS
To ED immediately for continued elevated heart rate above 130 or worsening sense of palpitations  Take propanolol as needed  For any changes or concerns go directly to ED and call neuropsych to inform him  Stop Savella per Dr. Santo

## 2017-05-05 NOTE — MR AVS SNAPSHOT
[facility]  [address]  [place] [address]  Phone: [phone]  Fax: [phone]                  Dorothy Jesus   2017 1:40 PM   Office Visit    Description:  Female : 1980   Provider:  Faiza Tijerina NP   Department:  [facility]           Reason for Visit     Ear Pain           Diagnoses this Visit        Comments    Tachycardia    -  Primary     Cough                To Do List           Future Appointments        Provider Department Dept Phone    2017 9:00 AM HARMEET Nguyen MD [facility] [phone]    2017 10:40 AM HARMEET Nguyen MD [facility] [phone]    2017 10:30 AM Glory St DO [facility] [phone]      Goals (5 Years of Data)     None      Follow-Up and Disposition     Return in about 3 days (around 2017), or if symptoms worsen or fail to improve.       These Medications        Disp Refills Start End    propranolol (INDERAL) 20 MG tablet 10 tablet 0 2017    Take 1 tablet (20 mg total) by mouth 2 (two) times daily as needed. - Oral    Pharmacy: [facility] [address] [address] #: [phone]         [facility]     [facility] Care [facility] -  Assistance  Unless otherwise directed by your provider, please contact [facility], our nurse care line that is available for  assistance.     Registered nurses in the [facility] provide: appointment scheduling, clinical advisement, health education, and other advisory services.  Call: [phone] (toll free)               Medications           Message regarding Medications     Verify the changes and/or additions to your medication regime listed below are the same as discussed with your clinician today.  If any of these changes or additions are incorrect, please notify your healthcare provider.        START taking these NEW  "medications        Refills    propranolol (INDERAL) 20 MG tablet 0    Sig: Take 1 tablet (20 mg total) by mouth 2 (two) times daily as needed.    Class: Normal    Route: Oral      STOP taking these medications     cyanocobalamin (VITAMIN B-12) 1,000 mcg/mL injection Inject 1 mL (1,000 mcg total) into the muscle every 28 days.    methylPREDNISolone (MEDROL DOSEPACK) 4 mg tablet use as directed           Verify that the below list of medications is an accurate representation of the medications you are currently taking.  If none reported, the list may be blank. If incorrect, please contact your healthcare provider. Carry this list with you in case of emergency.           Current Medications     alprazolam (XANAX) 1 MG tablet     buPROPion (WELLBUTRIN XL) 300 MG 24 hr tablet Take 150 mg by mouth once daily.     butalbital-acetaminophen-caffeine -40 mg (FIORICET, ESGIC) -40 mg per tablet Take 1 tablet by mouth every 6 (six) hours as needed for Pain or Headaches.    clonazePAM (KLONOPIN) 2 MG Tab     dextroamphetamine-amphetamine (ADDERALL XR) 30 MG 24 hr capsule Take 30 mg by mouth every morning.    dextroamphetamine-amphetamine (ADDERALL) 20 mg tablet Take 1 tablet by mouth 2 (two) times daily. Takes at one o'clock in afternoon. Takes 30 mg in morning.    ketorolac (TORADOL) 10 mg tablet Take 1 tablet (10 mg total) by mouth daily as needed for Pain.    SAVELLA 50 mg Tab     syringe with needle 1 mL 27 gauge Syrg For B12 injections    tizanidine (ZANAFLEX) 2 MG tablet Take 4 mg by mouth every 6 (six) hours as needed.    vitamin D 1000 units Tab Take 185 mg by mouth once daily.    propranolol (INDERAL) 20 MG tablet Take 1 tablet (20 mg total) by mouth 2 (two) times daily as needed.           Clinical Reference Information           Your Vitals Were     BP Pulse Temp Resp Height Weight    98/62 (BP Location: Left arm, Patient Position: Sitting) 138 98.5 °F (36.9 °C) (Oral) 18 5' 2" (1.575 m) 44.4 kg (97 lb 14.2 " oz)    Last Period SpO2 BMI          04/28/2017 (Approximate) 98% 17.9 kg/m2        Blood Pressure          Most Recent Value    BP  98/62      Allergies as of 5/5/2017     Gabapentin    Lithium Analogues    Nortriptyline    Topamax [Topiramate]    Pseudoephedrine      Immunizations Administered on Date of Encounter - 5/5/2017     None      Orders Placed During Today's Visit      Normal Orders This Visit    IN OFFICE EKG 12-LEAD (to [place])       Instructions    To ED immediately for continued elevated heart rate above 130 or worsening sense of palpitations  Take propanolol as needed  For any changes or concerns go directly to ED and call neuropsych to inform him  Stop Savella per Dr. Santo       Language Assistance Services     ATTENTION: Language assistance services are available, free of charge. Please call [phone].      ATENCIÓN: Si kayleighla tameka, tiene a rodriguez disposición servicios gratuitos de asistencia lingüística. Llame al [phone].     CHÚ Ý: N?u b?n nói Ti?ng Vi?t, có các d?ch v? h? tr? ngôn ng? mi?n phí dành cho b?n. G?i s? [phone].         [facility] complies with applicable Federal civil rights laws and does not discriminate on the basis of race, color, national origin, age, disability, or sex.

## 2017-05-05 NOTE — PROGRESS NOTES
Subjective:       Patient ID: Dorothy Jesus is a 37 y.o. female.    Chief Complaint: Ear Pain (Patient reports decrease in hearing and pressure in ears for about a 7-10days. Believes this to be related to seasonal allergies. )  She was last seen in primary care by Dr. Nguyen on 04/13/2017. She last saw me on 09/01/2016. She is accompanied by her spouse at this appt.  HPI   Decrease hearing in left ear for past 7-10 day and elevated heart rate.  Vitals:    05/05/17 1353   BP: 98/62   Pulse: (!) 138   Resp: 18   Temp: 98.5 °F (36.9 °C)   Apical at 92 on 04/13/2017  Apical at 64 on 02/01/2017  CMP  Sodium   Date Value Ref Range Status   04/13/2017 142 136 - 145 mmol/L Final     Potassium   Date Value Ref Range Status   04/13/2017 4.0 3.5 - 5.1 mmol/L Final     Chloride   Date Value Ref Range Status   04/13/2017 105 95 - 110 mmol/L Final     CO2   Date Value Ref Range Status   04/13/2017 27 23 - 29 mmol/L Final     Glucose   Date Value Ref Range Status   04/13/2017 88 70 - 110 mg/dL Final     BUN, Bld   Date Value Ref Range Status   04/13/2017 12 6 - 20 mg/dL Final     Creatinine   Date Value Ref Range Status   04/13/2017 0.9 0.5 - 1.4 mg/dL Final     Calcium   Date Value Ref Range Status   04/13/2017 9.4 8.7 - 10.5 mg/dL Final     Total Protein   Date Value Ref Range Status   04/13/2017 7.1 6.0 - 8.4 g/dL Final     Albumin   Date Value Ref Range Status   04/13/2017 4.2 3.5 - 5.2 g/dL Final     Total Bilirubin   Date Value Ref Range Status   04/13/2017 0.6 0.1 - 1.0 mg/dL Final     Comment:     For infants and newborns, interpretation of results should be based  on gestational age, weight and in agreement with clinical  observations.  Premature Infant recommended reference ranges:  Up to 24 hours.............<8.0 mg/dL  Up to 48 hours............<12.0 mg/dL  3-5 days..................<15.0 mg/dL  6-29 days.................<15.0 mg/dL       Alkaline Phosphatase   Date Value Ref Range Status   04/13/2017 61 55 -  "135 U/L Final     AST   Date Value Ref Range Status   04/13/2017 16 10 - 40 U/L Final     ALT   Date Value Ref Range Status   04/13/2017 12 10 - 44 U/L Final     Anion Gap   Date Value Ref Range Status   04/13/2017 10 8 - 16 mmol/L Final     eGFR if    Date Value Ref Range Status   04/13/2017 >60.0 >60 mL/min/1.73 m^2 Final     eGFR if non    Date Value Ref Range Status   04/13/2017 >60.0 >60 mL/min/1.73 m^2 Final     Comment:     Calculation used to obtain the estimated glomerular filtration  rate (eGFR) is the CKD-EPI equation. Since race is unknown   in our information system, the eGFR values for   -American and Non--American patients are given   for each creatinine result.       Review of Systems   Constitutional: Positive for activity change. Negative for unexpected weight change.   HENT: Positive for hearing loss, rhinorrhea and trouble swallowing.    Eyes: Negative for discharge and visual disturbance.   Respiratory: Negative for chest tightness.         States had some tightness that improved with antihistamine   Cardiovascular: Positive for palpitations. Negative for chest pain.   Gastrointestinal: Negative for blood in stool, constipation, diarrhea and vomiting.   Endocrine: Negative for polydipsia and polyuria.   Genitourinary: Negative for difficulty urinating, dysuria, hematuria and menstrual problem.   Musculoskeletal: Positive for arthralgias. Negative for joint swelling.        This arthralgia is a chronic problem.   Neurological: Positive for headaches. Negative for weakness.   Psychiatric/Behavioral: Negative for agitation, confusion, dysphoric mood, self-injury and suicidal ideas. The patient is nervous/anxious.         States feels detached from normal activities and other people and "some of self awareness".         Rhinorrhea, eyes burning  She started Savella one week ago- and has felt "detached since starting Savella and has had the fast heart " "rate last several days".   She does have a neuropsychologist that comes to her house for visits  Objective:      Physical Exam   Constitutional: She is oriented to person, place, and time. She appears well-developed and well-nourished.   HENT:   Head: Normocephalic and atraumatic.   Right Ear: External ear normal.   Left Ear: External ear normal.   Nose: Nose normal.   Mouth/Throat: Oropharynx is clear and moist.   Eyes: Lids are normal.   Neck: Normal range of motion. Neck supple.   Cardiovascular: Normal heart sounds.  Tachycardia present.    Pronounced beat, apical at 142, cannot slow with slow deep breathing,     Pulmonary/Chest: Effort normal and breath sounds normal.   Abdominal: Soft. There is no tenderness.   Neurological: She is alert and oriented to person, place, and time.   Skin: Skin is warm, dry and intact.   Psychiatric: Her speech is normal and behavior is normal. Judgment and thought content normal. Her mood appears anxious. Cognition and memory are normal. She expresses no homicidal and no suicidal ideation. She expresses no suicidal plans and no homicidal plans.   She appears to have a "hyperawareness of symptoms" when discussing symptoms in her visit   Nursing note and vitals reviewed.    She has a short-quick infrequent cough during exam and she states it comes when she feels palpitation "which makes me cough"    Assessment & Plan:       Medication side effect, initial encounter    Tachycardia  -     IN OFFICE EKG 12-LEAD (to Muse)  -     propranolol (INDERAL) 20 MG tablet; Take 1 tablet (20 mg total) by mouth 2 (two) times daily as needed.  Dispense: 10 tablet; Refill: 0    Bipolar 2 disorder    Palpitations    Cough    I spent 30 minutes discussing my concerns with her tachycardia and the need to get heart rate lower. During this time I asked patient to call her neuropsych provider via her cellular phone. She was able to reach him through his office personnel. She placed her telephone on " speaker via her and I was able to communicate with her neuro-psychiatrist ().   We spoke to him for approximately 5 minute. He recommended that she stops the Savella immediately at this time.   I have recommended that her  take her to ED if she has any worsening of her symptoms over the weekend. He is to also make an appt to be seen by Dr. Olivares as soon as possible for further evaluation.    I have sent her home with the following instructions:  To ED immediately for continued elevated heart rate above 130 or worsening sense of palpitations  Take propanolol as needed  For any changes or concerns go directly to ED and call neuropsych to inform him  Stop Savella per Dr. Olivares        Return in about 3 days (around 5/8/2017), or if symptoms worsen or fail to improve. Will see her PCP on Monday for follow up

## 2017-05-08 ENCOUNTER — OFFICE VISIT (OUTPATIENT)
Dept: FAMILY MEDICINE | Facility: CLINIC | Age: 37
End: 2017-05-08
Payer: MEDICAID

## 2017-05-08 VITALS
HEART RATE: 133 BPM | WEIGHT: 97.25 LBS | RESPIRATION RATE: 20 BRPM | HEIGHT: 62 IN | SYSTOLIC BLOOD PRESSURE: 112 MMHG | BODY MASS INDEX: 17.9 KG/M2 | DIASTOLIC BLOOD PRESSURE: 64 MMHG

## 2017-05-08 DIAGNOSIS — R00.0 TACHYCARDIA: ICD-10-CM

## 2017-05-08 DIAGNOSIS — H69.92 ETD (EUSTACHIAN TUBE DYSFUNCTION), LEFT: Primary | ICD-10-CM

## 2017-05-08 PROCEDURE — 99999 PR PBB SHADOW E&M-EST. PATIENT-LVL III: CPT | Mod: PBBFAC,,, | Performed by: FAMILY MEDICINE

## 2017-05-08 PROCEDURE — 99213 OFFICE O/P EST LOW 20 MIN: CPT | Mod: PBBFAC,PO | Performed by: FAMILY MEDICINE

## 2017-05-08 PROCEDURE — 99213 OFFICE O/P EST LOW 20 MIN: CPT | Mod: S$PBB,,, | Performed by: FAMILY MEDICINE

## 2017-05-08 RX ORDER — METOPROLOL SUCCINATE 25 MG/1
25 TABLET, EXTENDED RELEASE ORAL DAILY
Qty: 30 TABLET | Refills: 5 | Status: SHIPPED | OUTPATIENT
Start: 2017-05-08 | End: 2017-11-17 | Stop reason: SDUPTHER

## 2017-05-08 RX ORDER — FLUTICASONE PROPIONATE 50 MCG
2 SPRAY, SUSPENSION (ML) NASAL DAILY
Qty: 1 BOTTLE | Refills: 5 | Status: SHIPPED | OUTPATIENT
Start: 2017-05-08 | End: 2019-07-17

## 2017-05-08 NOTE — MR AVS SNAPSHOT
[facility]  [address]  [place] [address]  Phone: [phone]  Fax: [phone]                  Dorothy Jesus   2017 9:00 AM   Office Visit    Description:  Female : 1980   Provider:  HARMEET Nguyen MD   Department:  [facility]           Reason for Visit     Tachycardia     Otalgia           Diagnoses this Visit        Comments    ETD (eustachian tube dysfunction), left    -  Primary     Tachycardia                To Do List           Future Appointments        Provider Department Dept Phone    2017 10:40 AM HARMEET Nguyen MD [facility] [phone]    2017 10:30 AM Glory St DO [facility] [phone]      Goals (5 Years of Data)     None      Follow-Up and Disposition     Return in about 3 months (around 2017), or if symptoms worsen or fail to improve.    Follow-up and Disposition History       These Medications        Disp Refills Start End    fluticasone (FLONASE) 50 mcg/actuation nasal spray 1 Bottle 5 2017     2 sprays by Each Nare route once daily. - Each Nare    Pharmacy: [facility] [address]  [address] Ph #: [phone]       metoprolol succinate (TOPROL-XL) 25 MG 24 hr tablet 30 tablet 5 2017    Take 1 tablet (25 mg total) by mouth once daily. - Oral    Pharmacy: [facility] [place] - [address] [facility] [address] Ph #: [phone]         [facility]     [facility] Nurse Care Line -  Assistance  Unless otherwise directed by your provider, please contact [facility], our nurse care line that is available for  assistance.     Registered nurses in the [facility] provide: appointment scheduling, clinical advisement, health education, and other advisory services.  Call: [phone] (toll free)               Medications           Message regarding Medications     Verify the changes  and/or additions to your medication regime listed below are the same as discussed with your clinician today.  If any of these changes or additions are incorrect, please notify your healthcare provider.        START taking these NEW medications        Refills    fluticasone (FLONASE) 50 mcg/actuation nasal spray 5    Si sprays by Each Nare route once daily.    Class: Normal    Route: Each Nare    metoprolol succinate (TOPROL-XL) 25 MG 24 hr tablet 5    Sig: Take 1 tablet (25 mg total) by mouth once daily.    Class: Normal    Route: Oral      STOP taking these medications     SAVELLA 50 mg Tab     propranolol (INDERAL) 20 MG tablet Take 1 tablet (20 mg total) by mouth 2 (two) times daily as needed.           Verify that the below list of medications is an accurate representation of the medications you are currently taking.  If none reported, the list may be blank. If incorrect, please contact your healthcare provider. Carry this list with you in case of emergency.           Current Medications     alprazolam (XANAX) 1 MG tablet     buPROPion (WELLBUTRIN XL) 300 MG 24 hr tablet Take 150 mg by mouth once daily.     butalbital-acetaminophen-caffeine -40 mg (FIORICET, ESGIC) -40 mg per tablet Take 1 tablet by mouth every 6 (six) hours as needed for Pain or Headaches.    clonazePAM (KLONOPIN) 2 MG Tab     dextroamphetamine-amphetamine (ADDERALL XR) 30 MG 24 hr capsule Take 30 mg by mouth every morning.    dextroamphetamine-amphetamine (ADDERALL) 20 mg tablet Take 1 tablet by mouth 2 (two) times daily. Takes at one o'clock in afternoon. Takes 30 mg in morning.    hydroxychloroquine (PLAQUENIL) 200 mg tablet Take 1 tablet (200 mg total) by mouth 2 (two) times daily.    ketorolac (TORADOL) 10 mg tablet Take 1 tablet (10 mg total) by mouth daily as needed for Pain.    predniSONE (DELTASONE) 5 MG tablet Take 1 tablet (5 mg total) by mouth once daily.    syringe with needle 1 mL 27 gauge Syrg For B12 injections  "   tizanidine (ZANAFLEX) 2 MG tablet Take 4 mg by mouth every 6 (six) hours as needed.    vitamin D 1000 units Tab Take 185 mg by mouth once daily.    fluticasone (FLONASE) 50 mcg/actuation nasal spray 2 sprays by Each Nare route once daily.    metoprolol succinate (TOPROL-XL) 25 MG 24 hr tablet Take 1 tablet (25 mg total) by mouth once daily.           Clinical Reference Information           Your Vitals Were     BP Pulse Resp Height Weight Last Period    112/64 (BP Location: Left arm, Patient Position: Sitting) 133 20 5' 2" (1.575 m) 44.1 kg (97 lb 3.6 oz) 04/28/2017 (Approximate)    BMI                17.78 kg/m2          Blood Pressure          Most Recent Value    BP  112/64      Allergies as of 5/8/2017     Gabapentin    Lithium Analogues    Nortriptyline    Topamax [Topiramate]    Pseudoephedrine      Immunizations Administered on Date of Encounter - 5/8/2017     None      Language Assistance Services     ATTENTION: Language assistance services are available, free of charge. Please call [phone].      ATENCIÓN: Si habla arceliaañol, tiene a rodriguez disposición servicios gratuitos de asistencia lingüística. Llame al [phone].     KAITE Ý: N?u b?n nói Ti?ng Vi?t, có các d?ch v? h? tr? ngôn ng? mi?n phí dành cho b?n. G?i s? [phone].         [facility] complies with applicable Federal civil rights laws and does not discriminate on the basis of race, color, national origin, age, disability, or sex.        "

## 2017-07-27 ENCOUNTER — PATIENT MESSAGE (OUTPATIENT)
Dept: FAMILY MEDICINE | Facility: CLINIC | Age: 37
End: 2017-07-27

## 2017-08-24 ENCOUNTER — LAB VISIT (OUTPATIENT)
Dept: LAB | Facility: HOSPITAL | Age: 37
End: 2017-08-24
Attending: INTERNAL MEDICINE
Payer: MEDICAID

## 2017-08-24 ENCOUNTER — OFFICE VISIT (OUTPATIENT)
Dept: RHEUMATOLOGY | Facility: CLINIC | Age: 37
End: 2017-08-24
Payer: MEDICAID

## 2017-08-24 VITALS
BODY MASS INDEX: 17.56 KG/M2 | SYSTOLIC BLOOD PRESSURE: 100 MMHG | HEIGHT: 62 IN | HEART RATE: 88 BPM | WEIGHT: 95.44 LBS | DIASTOLIC BLOOD PRESSURE: 60 MMHG

## 2017-08-24 DIAGNOSIS — R53.83 MALAISE AND FATIGUE: ICD-10-CM

## 2017-08-24 DIAGNOSIS — M79.7 FIBROMYALGIA: ICD-10-CM

## 2017-08-24 DIAGNOSIS — R53.81 MALAISE AND FATIGUE: ICD-10-CM

## 2017-08-24 DIAGNOSIS — M35.9 UNDIFFERENTIATED CONNECTIVE TISSUE DISEASE: Primary | ICD-10-CM

## 2017-08-24 PROCEDURE — 3008F BODY MASS INDEX DOCD: CPT | Mod: ,,, | Performed by: INTERNAL MEDICINE

## 2017-08-24 PROCEDURE — 99999 PR PBB SHADOW E&M-EST. PATIENT-LVL III: CPT | Mod: PBBFAC,,, | Performed by: INTERNAL MEDICINE

## 2017-08-24 PROCEDURE — 99213 OFFICE O/P EST LOW 20 MIN: CPT | Mod: PBBFAC,PO | Performed by: INTERNAL MEDICINE

## 2017-08-24 PROCEDURE — 36415 COLL VENOUS BLD VENIPUNCTURE: CPT | Mod: PO

## 2017-08-24 PROCEDURE — 80074 ACUTE HEPATITIS PANEL: CPT

## 2017-08-24 PROCEDURE — 99214 OFFICE O/P EST MOD 30 MIN: CPT | Mod: S$PBB,,, | Performed by: INTERNAL MEDICINE

## 2017-08-24 RX ORDER — FOLIC ACID 1 MG/1
1 TABLET ORAL DAILY
Qty: 90 TABLET | Refills: 3 | Status: SHIPPED | OUTPATIENT
Start: 2017-08-24 | End: 2017-10-23

## 2017-08-24 RX ORDER — PREDNISONE 5 MG/1
5 TABLET ORAL DAILY
Qty: 90 TABLET | Refills: 3 | Status: SHIPPED | OUTPATIENT
Start: 2017-08-24 | End: 2018-09-16 | Stop reason: SDUPTHER

## 2017-08-24 RX ORDER — METHOTREXATE 2.5 MG/1
10 TABLET ORAL
Qty: 16 TABLET | Refills: 2 | Status: SHIPPED | OUTPATIENT
Start: 2017-08-24 | End: 2017-09-26

## 2017-08-24 NOTE — PROGRESS NOTES
"Subjective:          Chief Complaint: Dorothy Jesus is a 37 y.o. female who had concerns including Disease Management.    HPI:    With a positive TONEY trial of prednisone did show significant improvement in her overall symptoms consistent or at least suggestive of an undifferentiated connective tissue disease slight elevation in her CRP normal sedimentation rate but did have benefit on prednisone.  When she comes off prednisone everything seems to regress at her baseline.  Unfortunately she is not having a significant improvement with the start of Plaquenil.    Pred on 2.5 mg good with self care, up all day and participate in some  family activity  Pred on 5mg even more active, play dates, cooking.   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 No cardiac manifestations.       Tizanidine does take the "edge" off her myalgias.  We did try Robaxin this offered no help.  Not typically using every day, but as of late. On a good day 30% help, on average 10%,Ketoprofen not helping as much for aches as once did.  Savella was recently written by   Felix PRN does help for bad days  She notes with  on full time employment stress much reduced which makes symptoms much more tolerable.        She has increased her activity level trying to eat healthier.  In the past she states she was getting some relief of myalgias and arthralgias on ketoprofen.  He completed the vitamin D 12 weeks her psychiatrist has recently adjusted her medications slightly and this seems to be helping a great deal.  Interestingly she noted with the adjustment in her ADHD meds her myalgias have improved.Patient is a 36 y/o female referred for +LENA  1:80 homogeneous with repeat serologies + 1:320 negative LENA profile.  with myalgias, brain fog. Present for few years worse in past 2 years, affecting daily living/function. Patient did suffer from worsening of her symptoms after trying gabapentin, she did increase dose as we discussed but this " "only exacerbated her complaints. She did have worseing depression through the winter. Recent job did not work out for her, which was frustrating.  did recently get full time job.    Exacerbates with menstrual cycles. Patient has a non-restorative sleep pattern, no snoring. She has had sleep study-[place] negative for narcoplepsy. Patient was given Provigil exacerbated anxiety. Ibuprofen 600mg no help. APAP no help, naproxen no help.       Patient was on lamictal for nearly 8 years d/c'd for suspected myalgias. Recently (few weeks ago) trialed lithium did not tolerate. Zyprexa, Geodon, topamax and risperdol. Wellbutrin has been the best medication for her as she continues to feel "human". She is having more myalgias, and some arthralgias some good days some bad days. She cannot see a pattern of morning stiffness.     Component      Latest Ref Rng & Units 4/13/2017 12/10/2015   Anti Sm Antibody      0.00 - 19.99 EU 0.35 0.43   Anti-Sm Interpretation      Negative Negative Negative   Anti-SSA Antibody      0.00 - 19.99 EU 0.97 1.62   Anti-SSA Interpretation      Negative Negative Negative   Anti-SSB Antibody      0.00 - 19.99 EU 0.35 0.00   Anti-SSB Interpretation      Negative Negative Negative   ds DNA Ab      Negative 1:10 Negative 1:10 Negative 1:10   Anti Sm/RNP Antibody      0.00 - 19.99 EU 0.79 0.86   Anti-Sm/RNP Interpretation      Negative Negative Negative   Complement (C-3)      50 - 180 mg/dL  108   Complement (C-4)      11 - 44 mg/dL  27   CPK      20 - 180 U/L  50   LENA Screen      Negative <1:160 Positive (A)    LENA HEP-2 Titer       Positive 1:320 Homogeneous        REVIEW OF SYSTEMS:    Review of Systems   Constitutional: Positive for malaise/fatigue. Negative for fever and weight loss.   HENT: Negative for sore throat.    Eyes: Negative for double vision, photophobia and redness.   Respiratory: Negative for cough, shortness of breath and wheezing.    Cardiovascular: Negative for chest pain, " palpitations and orthopnea.   Gastrointestinal: Negative for abdominal pain, constipation and diarrhea.   Genitourinary: Negative for dysuria, hematuria and urgency.   Musculoskeletal: Positive for joint pain and neck pain. Negative for back pain and myalgias.   Skin: Negative for rash.   Neurological: Negative for dizziness, tingling, focal weakness and headaches.   Endo/Heme/Allergies: Does not bruise/bleed easily.   Psychiatric/Behavioral: Negative for depression, hallucinations and suicidal ideas.               Objective:            Past Medical History:   Diagnosis Date    Anxiety     Bartholin's cyst     Bipolar 2 disorder     Chronic fatigue     Osteopenia      No family history on file.  Social History   Substance Use Topics    Smoking status: Never Smoker    Smokeless tobacco: Not on file    Alcohol use No      Comment: rare         Current Outpatient Prescriptions on File Prior to Visit   Medication Sig Dispense Refill    alprazolam (XANAX) 1 MG tablet       buPROPion (WELLBUTRIN XL) 300 MG 24 hr tablet Take 150 mg by mouth once daily.       clonazePAM (KLONOPIN) 2 MG Tab       dextroamphetamine-amphetamine (ADDERALL XR) 30 MG 24 hr capsule Take 30 mg by mouth every morning.      dextroamphetamine-amphetamine (ADDERALL) 20 mg tablet Take 1 tablet by mouth 2 (two) times daily. Takes at one o'clock in afternoon. Takes 30 mg in morning.      fluticasone (FLONASE) 50 mcg/actuation nasal spray 2 sprays by Each Nare route once daily. 1 Bottle 5    hydroxychloroquine (PLAQUENIL) 200 mg tablet Take 1 tablet (200 mg total) by mouth 2 (two) times daily. 60 tablet 6    ketorolac (TORADOL) 10 mg tablet Take 1 tablet (10 mg total) by mouth daily as needed for Pain. 5 tablet 11    metoprolol succinate (TOPROL-XL) 25 MG 24 hr tablet Take 1 tablet (25 mg total) by mouth once daily. 30 tablet 5    syringe with needle 1 mL 27 gauge Syrg For B12 injections 12 Syringe 2    tizanidine (ZANAFLEX) 2 MG tablet  Take 4 mg by mouth every 6 (six) hours as needed.      vitamin D 1000 units Tab Take 185 mg by mouth once daily.       Current Facility-Administered Medications on File Prior to Visit   Medication Dose Route Frequency Provider Last Rate Last Dose    cyanocobalamin injection 1,000 mcg  1,000 mcg Intramuscular Q30 Days Glory St, DO   1,000 mcg at 04/24/17 1154       Vitals:    08/24/17 1046   BP: 100/60   Pulse: 88       Physical Exam:    Physical Exam   Constitutional: She appears well-developed and well-nourished.   HENT:   Nose: No septal deviation.   Mouth/Throat: Mucous membranes are normal. No oral lesions.   Eyes: Pupils are equal, round, and reactive to light. Right conjunctiva is not injected. Left conjunctiva is not injected.   Neck: No JVD present. No thyroid mass and no thyromegaly present.   Cardiovascular: Normal rate, regular rhythm and normal pulses.    No edema   Pulmonary/Chest: Effort normal and breath sounds normal.   Abdominal: Soft. Normal appearance. There is no hepatosplenomegaly.   Musculoskeletal:        Right shoulder: She exhibits normal range of motion, no tenderness and no swelling.        Left shoulder: She exhibits normal range of motion, no tenderness and no swelling.        Right elbow: She exhibits normal range of motion and no swelling. No tenderness found.        Left elbow: She exhibits normal range of motion and no swelling. No tenderness found.        Right wrist: She exhibits normal range of motion, no tenderness and no swelling.        Left wrist: She exhibits normal range of motion, no tenderness and no swelling.        Right hip: She exhibits normal range of motion, normal strength and no swelling.        Left hip: She exhibits normal range of motion, no tenderness and no swelling.        Right knee: She exhibits normal range of motion and no swelling. No tenderness found.        Left knee: She exhibits normal range of motion and no swelling. No tenderness found.         Right ankle: She exhibits normal range of motion and no swelling. No tenderness.        Left ankle: She exhibits normal range of motion and no swelling. No tenderness.   No synovitis of the MCP, PIP, DIP or wrist    18/18 FMS tender points   Lymphadenopathy:     She has no cervical adenopathy.     She has no axillary adenopathy.   Neurological: She has normal strength and normal reflexes.   Skin: Skin is dry and intact.   Psychiatric: She has a normal mood and affect.             Assessment:       Encounter Diagnoses   Name Primary?    Undifferentiated connective tissue disease Yes    Fibromyalgia     Malaise and fatigue           Plan:        Undifferentiated connective tissue disease  -     Comprehensive metabolic panel; Standing; Expected date: 08/24/2017  -     CBC auto differential; Standing; Expected date: 08/24/2017  -     Sedimentation rate, manual; Standing; Expected date: 08/24/2017  -     C-reactive protein; Standing; Expected date: 08/24/2017  -     methotrexate 2.5 MG Tab; Take 4 tablets (10 mg total) by mouth every 7 days.  Dispense: 16 tablet; Refill: 2  -     folic acid (FOLVITE) 1 MG tablet; Take 1 tablet (1 mg total) by mouth once daily.  Dispense: 90 tablet; Refill: 3    Fibromyalgia    Malaise and fatigue  -     Hepatitis panel, acute; Future; Expected date: 08/24/2017    Other orders  -     predniSONE (DELTASONE) 5 MG tablet; Take 1 tablet (5 mg total) by mouth once daily.  Dispense: 90 tablet; Refill: 3      Restart Prednisone 5mg daily  Continue HCQ for now no improvement at 12 weeks  Start MTX.       We also reviewed the risks benefits and monitoring requirements of methotrexate therapy; not limited to weekly dosing, monitoring requirements, daily folic acid the avoidance of alcohol and the potential abortifacient and teratogenic nature.   Minor elevations in aminotransferases are common, but hepatic steatosis, fibrosis, and cirrhosis may infrequently occur. Routine use of daily  folic acid supplementation, which is associated with a reduced risk of hepatic transaminase elevations. Pulmonary toxicity of MTX is seen with both high- and low-dose treatment .MTX is an immunomodulatory but not significantly immunosuppressive agent; it is not associated with opportunistic infections in the overwhelming majority of patients. Hematologic toxicity is possible, but a more serious abnormality is the development of pancytopenia. Lymphoproliferative disorders occur with increased frequency in RA, independent of specific therapies, but MTX therapy may increase such risk      Return in about 3 months (around 11/24/2017).  3 month f/u          30min consultation with greater than 50% spent in counseling, chart review and coordination of care. All questions answered.

## 2017-08-25 LAB
HAV IGM SERPL QL IA: NEGATIVE
HBV CORE IGM SERPL QL IA: NEGATIVE
HBV SURFACE AG SERPL QL IA: NEGATIVE
HCV AB SERPL QL IA: NEGATIVE

## 2017-09-22 ENCOUNTER — PATIENT MESSAGE (OUTPATIENT)
Dept: RHEUMATOLOGY | Facility: CLINIC | Age: 37
End: 2017-09-22

## 2017-09-22 DIAGNOSIS — M19.90 INFLAMMATORY ARTHRITIS: Primary | ICD-10-CM

## 2017-09-25 ENCOUNTER — TELEPHONE (OUTPATIENT)
Dept: FAMILY MEDICINE | Facility: CLINIC | Age: 37
End: 2017-09-25

## 2017-09-25 ENCOUNTER — OFFICE VISIT (OUTPATIENT)
Dept: FAMILY MEDICINE | Facility: CLINIC | Age: 37
End: 2017-09-25
Payer: MEDICAID

## 2017-09-25 ENCOUNTER — HOSPITAL ENCOUNTER (OUTPATIENT)
Dept: RADIOLOGY | Facility: HOSPITAL | Age: 37
Discharge: HOME | End: 2017-09-25
Attending: NURSE PRACTITIONER
Payer: MEDICAID

## 2017-09-25 VITALS
WEIGHT: 94.56 LBS | OXYGEN SATURATION: 98 % | HEART RATE: 99 BPM | HEIGHT: 62 IN | SYSTOLIC BLOOD PRESSURE: 96 MMHG | DIASTOLIC BLOOD PRESSURE: 60 MMHG | TEMPERATURE: 99 F | RESPIRATION RATE: 16 BRPM | BODY MASS INDEX: 17.4 KG/M2

## 2017-09-25 DIAGNOSIS — R05.9 COUGH: ICD-10-CM

## 2017-09-25 DIAGNOSIS — J34.89 TENDERNESS OVER FRONTAL SINUS: ICD-10-CM

## 2017-09-25 DIAGNOSIS — J34.89 TENDERNESS OVER MAXILLARY SINUS: ICD-10-CM

## 2017-09-25 DIAGNOSIS — R51.9 HEADACHE, UNSPECIFIED HEADACHE TYPE: ICD-10-CM

## 2017-09-25 DIAGNOSIS — J30.2 CHRONIC SEASONAL ALLERGIC RHINITIS DUE TO OTHER ALLERGEN: ICD-10-CM

## 2017-09-25 DIAGNOSIS — Z87.898 HISTORY OF NASAL CONGESTION: Primary | ICD-10-CM

## 2017-09-25 DIAGNOSIS — M79.10 MYALGIA: ICD-10-CM

## 2017-09-25 DIAGNOSIS — Z87.898 HISTORY OF NASAL CONGESTION: ICD-10-CM

## 2017-09-25 PROCEDURE — 99213 OFFICE O/P EST LOW 20 MIN: CPT | Mod: S$PBB,,, | Performed by: NURSE PRACTITIONER

## 2017-09-25 PROCEDURE — 70210 X-RAY EXAM OF SINUSES: CPT | Mod: 26,,, | Performed by: RADIOLOGY

## 2017-09-25 PROCEDURE — 3008F BODY MASS INDEX DOCD: CPT | Mod: ,,, | Performed by: NURSE PRACTITIONER

## 2017-09-25 PROCEDURE — 70210 X-RAY EXAM OF SINUSES: CPT | Mod: TC,PO

## 2017-09-25 PROCEDURE — 99214 OFFICE O/P EST MOD 30 MIN: CPT | Mod: PBBFAC,25,PO | Performed by: NURSE PRACTITIONER

## 2017-09-25 PROCEDURE — 99999 PR PBB SHADOW E&M-EST. PATIENT-LVL IV: CPT | Mod: PBBFAC,,, | Performed by: NURSE PRACTITIONER

## 2017-09-25 RX ORDER — PROMETHAZINE HYDROCHLORIDE AND DEXTROMETHORPHAN HYDROBROMIDE 6.25; 15 MG/5ML; MG/5ML
5 SYRUP ORAL 2 TIMES DAILY PRN
Qty: 120 ML | Refills: 0 | Status: SHIPPED | OUTPATIENT
Start: 2017-09-25 | End: 2017-10-05

## 2017-09-25 NOTE — TELEPHONE ENCOUNTER
----- Message from Radha Wright sent at 9/25/2017  8:24 AM CDT -----  Contact:   Clarke,  [phone],  booked an appointment under his name for his wife to see Faiza Tijerina today. Patient has the Flu and is Medicaid, Patient is listed under Dr Nguyen on her ins card. Please advise. Thanks.

## 2017-09-25 NOTE — PATIENT INSTRUCTIONS
Loratadine daily over the counter for allergy symptoms  Use Delsym over the counter if you cannot get phenergan cough syrup  Saline nasal spray in shower at night      Allergic Rhinitis  Allergic rhinitis is an allergic reaction that affects the nose, and often the eyes. Its often known as nasal allergies. Nasal allergies are often due to things in the environment that are breathed in. Depending what you are sensitive to, nasal allergies may occur only during certain seasons. Or they may occur year round. Common indoor allergens include house dust mites, mold, cockroaches, and pet dander. Outdoor allergens include pollen from trees, grasses, and weeds.   Symptoms include a drippy, stuffy, and itchy nose. They also include sneezing and red and itchy eyes. You may feel tired more often. Severe allergies may also affect your breathing and trigger a condition called asthma.   Tests can be done to see what allergens are affecting you. You may be referred to an allergy specialist for testing and further evaluation.  Home care  Your healthcare provider may prescribe medicines to help relieve allergy symptoms. These may include oral medicines, nasal sprays, or eye drops.  Ask your provider for advice on how to avoid substances that you are allergic to. Below are a few tips for each type of allergen.  Pet dander:  · Do not have pets with fur and feathers.  · If you can't avoid having a pet, keep it out of your bedroom and off upholstered furniture.  Pollen:  · When pollen counts are high, keep windows of your car and home closed. If possible, use an air conditioner instead.  · Wear a filter mask when mowing or doing yard work.  House dust mites:  · Wash bedding every week in warm water and detergent and dry on a hot setting.  · Cover the mattress, box spring, and pillows with allergy covers.   · If possible, sleep in a room with no carpet, curtains, or upholstered furniture.  Cockroaches:  · Store food in sealed  containers.  · Remove garbage from the home promptly.  · Fix water leaks  Mold:  · Keep humidity low by using a dehumidifier or air conditioner. Keep the dehumidifier and air conditioner clean and free of mold.  · Clean moldy areas with bleach and water.  In general:  · Vacuum once or twice a week. If possible, use a vacuum with a high-efficiency particulate air (HEPA) filter.  · Do not smoke. Avoid cigarette smoke. Cigarette smoke is an irritant that can make symptoms worse.  Follow-up care  Follow up as advised by the healthcare provider or our staff. If you were referred to an allergy specialist, make this appointment promptly.  When to seek medical advice  Call your healthcare provider right away if the following occur:  · Coughing or wheezing  · Fever greater than 100.4°F (38°C)  · Hives (raised red bumps)  · Continuing symptoms, new symptoms, or worsening symptoms  Call 911 right away if you have:  · Trouble breathing  · Severe swelling of the face or severe itching of the eyes or mouth  Date Last Reviewed: 3/1/2017  © 9043-3469 [employer]. [address] [place] [id number]. All rights reserved. This information is not intended as a substitute for professional medical care. Always follow your healthcare professional's instructions.

## 2017-09-25 NOTE — PROGRESS NOTES
"Subjective:       Patient ID: Dorothy Jesus is a 37 y.o. female.    Chief Complaint: Nasal Congestion and Generalized Body Aches  She was last seen in primary care by Dr. Nguyen on 05/08/2017. I last saw her on 05/05/2017. She is accompanied today by her spouse.  HPI   She is her today with complaints of congestion and body aches. She states her symptoms began a few weeks ago with "the sniffles". States the past week she started loosing voice and Friday could hardly talk. States wiped out Saturday and Sunday and was achy.  States she took cough medicine with antihistamine to help last night. States the medication helps for a few hours. Cough medicine help to sleep. States has been afebrile. Cannot hear well out of left ear and has had headache due to congestion.   Vitals:    09/25/17 0908   BP: 96/60   Pulse: 99   Resp: 16   Temp: 98.7 °F (37.1 °C)     BP Readings from Last 3 Encounters:   09/25/17 96/60   08/24/17 100/60   05/08/17 112/64     Review of Systems    Objective:      Physical Exam   Constitutional: She appears well-developed and well-nourished. She is active and cooperative. She appears ill.   Thin frail frame and appearance   HENT:   Head: Normocephalic and atraumatic.   Right Ear: Hearing, tympanic membrane, external ear and ear canal normal.   Left Ear: Tympanic membrane, external ear and ear canal normal. No swelling. Decreased hearing is noted.   Nose: Right sinus exhibits maxillary sinus tenderness and frontal sinus tenderness. Left sinus exhibits maxillary sinus tenderness and frontal sinus tenderness.   Mouth/Throat: Uvula is midline and oropharynx is clear and moist.   Complains of tenderness with palpation of frontal and maxillary sinus regions.  Complains of not hearing as well out of left ear   Eyes: Conjunctivae, EOM and lids are normal. Pupils are equal, round, and reactive to light.   Neck: Trachea normal, normal range of motion, full passive range of motion without pain and " phonation normal. Neck supple.   Cardiovascular: Normal rate, regular rhythm, S1 normal, S2 normal and normal heart sounds.    Pulmonary/Chest: Effort normal and breath sounds normal.   Abdominal: Soft. Normal appearance. There is no splenomegaly or hepatomegaly. There is no tenderness.       Musculoskeletal: Normal range of motion.   Lymphadenopathy:        Head (right side): No submental, no submandibular, no tonsillar, no preauricular, no posterior auricular and no occipital adenopathy present.        Head (left side): No submental, no submandibular, no tonsillar, no preauricular, no posterior auricular and no occipital adenopathy present.     She has no cervical adenopathy.   Neurological: She is alert.   Skin: Skin is warm, dry and intact.   Psychiatric: She has a normal mood and affect. Her speech is normal. Judgment and thought content normal. She is agitated. Cognition and memory are normal.   Nursing note and vitals reviewed.      Assessment & Plan:       History of nasal congestion  -     X-Ray Sinuses Ltd Less Than 3 Views; Future; Expected date: 09/25/2017    Chronic seasonal allergic rhinitis due to other allergen  -     X-Ray Sinuses Ltd Less Than 3 Views; Future; Expected date: 09/25/2017    Cough  -     promethazine-dextromethorphan (PROMETHAZINE-DM) 6.25-15 mg/5 mL Syrp; Take 5 mLs by mouth 2 (two) times daily as needed. May cause drowsiness  Dispense: 120 mL; Refill: 0    Tenderness over frontal sinus  -     X-Ray Sinuses Ltd Less Than 3 Views; Future; Expected date: 09/25/2017    Tenderness over maxillary sinus  -     X-Ray Sinuses Ltd Less Than 3 Views; Future; Expected date: 09/25/2017    Myalgia    Headache, unspecified headache type    Loratadine daily over the counter for allergy symptoms  Use Delsym over the counter if you cannot get phenergan cough syrup  Saline nasal spray in shower at night        Return if symptoms worsen or fail to improve.

## 2017-09-26 DIAGNOSIS — J01.80 OTHER ACUTE SINUSITIS: Primary | ICD-10-CM

## 2017-09-26 RX ORDER — SULFASALAZINE 500 MG/1
1000 TABLET ORAL 2 TIMES DAILY
Qty: 120 TABLET | Refills: 11 | Status: SHIPPED | OUTPATIENT
Start: 2017-09-26 | End: 2018-10-10

## 2017-09-26 RX ORDER — AMOXICILLIN 875 MG/1
875 TABLET, FILM COATED ORAL EVERY 12 HOURS
Qty: 20 TABLET | Refills: 0 | Status: SHIPPED | OUTPATIENT
Start: 2017-09-26 | End: 2017-10-06

## 2017-10-16 ENCOUNTER — PATIENT MESSAGE (OUTPATIENT)
Dept: RHEUMATOLOGY | Facility: CLINIC | Age: 37
End: 2017-10-16

## 2017-10-23 ENCOUNTER — LAB VISIT (OUTPATIENT)
Dept: LAB | Facility: HOSPITAL | Age: 37
End: 2017-10-23
Attending: FAMILY MEDICINE
Payer: MEDICAID

## 2017-10-23 ENCOUNTER — OFFICE VISIT (OUTPATIENT)
Dept: FAMILY MEDICINE | Facility: CLINIC | Age: 37
End: 2017-10-23
Payer: MEDICAID

## 2017-10-23 VITALS
WEIGHT: 97 LBS | RESPIRATION RATE: 16 BRPM | BODY MASS INDEX: 17.85 KG/M2 | HEART RATE: 94 BPM | HEIGHT: 62 IN | DIASTOLIC BLOOD PRESSURE: 58 MMHG | SYSTOLIC BLOOD PRESSURE: 90 MMHG

## 2017-10-23 DIAGNOSIS — M79.7 FIBROMYALGIA: ICD-10-CM

## 2017-10-23 DIAGNOSIS — G43.909 MIGRAINE WITHOUT STATUS MIGRAINOSUS, NOT INTRACTABLE, UNSPECIFIED MIGRAINE TYPE: ICD-10-CM

## 2017-10-23 DIAGNOSIS — F31.81 BIPOLAR 2 DISORDER: Primary | ICD-10-CM

## 2017-10-23 DIAGNOSIS — G93.32 CHRONIC FATIGUE SYNDROME: ICD-10-CM

## 2017-10-23 DIAGNOSIS — F31.81 BIPOLAR 2 DISORDER: ICD-10-CM

## 2017-10-23 LAB
ALBUMIN SERPL BCP-MCNC: 4 G/DL
ALP SERPL-CCNC: 69 U/L
ALT SERPL W/O P-5'-P-CCNC: 14 U/L
ANION GAP SERPL CALC-SCNC: 6 MMOL/L
AST SERPL-CCNC: 16 U/L
BASOPHILS # BLD AUTO: 0.08 K/UL
BASOPHILS NFR BLD: 0.6 %
BILIRUB SERPL-MCNC: 0.3 MG/DL
BUN SERPL-MCNC: 11 MG/DL
CALCIUM SERPL-MCNC: 9.4 MG/DL
CHLORIDE SERPL-SCNC: 105 MMOL/L
CO2 SERPL-SCNC: 27 MMOL/L
CREAT SERPL-MCNC: 0.9 MG/DL
DIFFERENTIAL METHOD: ABNORMAL
EOSINOPHIL # BLD AUTO: 0.1 K/UL
EOSINOPHIL NFR BLD: 0.5 %
ERYTHROCYTE [DISTWIDTH] IN BLOOD BY AUTOMATED COUNT: 13.2 %
EST. GFR  (AFRICAN AMERICAN): >60 ML/MIN/1.73 M^2
EST. GFR  (NON AFRICAN AMERICAN): >60 ML/MIN/1.73 M^2
GLUCOSE SERPL-MCNC: 95 MG/DL
HCT VFR BLD AUTO: 41.7 %
HGB BLD-MCNC: 14.3 G/DL
IMM GRANULOCYTES # BLD AUTO: 0.06 K/UL
IMM GRANULOCYTES NFR BLD AUTO: 0.5 %
LYMPHOCYTES # BLD AUTO: 1.6 K/UL
LYMPHOCYTES NFR BLD: 12.3 %
MCH RBC QN AUTO: 32.7 PG
MCHC RBC AUTO-ENTMCNC: 34.3 G/DL
MCV RBC AUTO: 95 FL
MONOCYTES # BLD AUTO: 0.4 K/UL
MONOCYTES NFR BLD: 3.4 %
NEUTROPHILS # BLD AUTO: 10.7 K/UL
NEUTROPHILS NFR BLD: 82.7 %
NRBC BLD-RTO: 0 /100 WBC
PLATELET # BLD AUTO: 216 K/UL
PMV BLD AUTO: 12.1 FL
POTASSIUM SERPL-SCNC: 4.5 MMOL/L
PROT SERPL-MCNC: 7.1 G/DL
RBC # BLD AUTO: 4.37 M/UL
SODIUM SERPL-SCNC: 138 MMOL/L
TSH SERPL DL<=0.005 MIU/L-ACNC: 0.77 UIU/ML
WBC # BLD AUTO: 12.89 K/UL

## 2017-10-23 PROCEDURE — 36415 COLL VENOUS BLD VENIPUNCTURE: CPT | Mod: PO

## 2017-10-23 PROCEDURE — 99214 OFFICE O/P EST MOD 30 MIN: CPT | Mod: S$PBB,,, | Performed by: FAMILY MEDICINE

## 2017-10-23 PROCEDURE — 99213 OFFICE O/P EST LOW 20 MIN: CPT | Mod: PBBFAC,PO,25 | Performed by: FAMILY MEDICINE

## 2017-10-23 PROCEDURE — 84443 ASSAY THYROID STIM HORMONE: CPT

## 2017-10-23 PROCEDURE — 80053 COMPREHEN METABOLIC PANEL: CPT

## 2017-10-23 PROCEDURE — 85025 COMPLETE CBC W/AUTO DIFF WBC: CPT

## 2017-10-23 PROCEDURE — 90686 IIV4 VACC NO PRSV 0.5 ML IM: CPT | Mod: PBBFAC,PO

## 2017-10-23 PROCEDURE — 99999 PR PBB SHADOW E&M-EST. PATIENT-LVL III: CPT | Mod: PBBFAC,,, | Performed by: FAMILY MEDICINE

## 2017-10-23 RX ORDER — CHOLECALCIFEROL (VITAMIN D3) 25 MCG
1000 TABLET ORAL DAILY
Status: CANCELLED | COMMUNITY
Start: 2017-10-23

## 2017-10-23 RX ORDER — PREDNISONE 5 MG/1
5 TABLET ORAL DAILY
COMMUNITY
End: 2018-10-10 | Stop reason: SDUPTHER

## 2017-10-23 RX ORDER — LORATADINE 10 MG/1
10 TABLET ORAL DAILY
COMMUNITY
End: 2019-07-17

## 2017-10-23 NOTE — PROGRESS NOTES
Subjective:       Patient ID: Dorothy Jesus is a 37 y.o. female.    Chief Complaint: Tachycardia (Patient states she feels that she is doing better on Metoprolol regimen)    Here for f/u anxiety.  Fibroymyalgia stable.  Follows with Dr. Roblero for psych.        Review of Systems   Constitutional: Negative for chills, fatigue and fever.   Respiratory: Negative for cough, chest tightness and shortness of breath.    Cardiovascular: Negative for chest pain, palpitations and leg swelling.   Gastrointestinal: Negative for abdominal distention and abdominal pain.   Endocrine: Negative for cold intolerance and heat intolerance.   Skin: Negative for rash.   Psychiatric/Behavioral: Positive for dysphoric mood. Negative for suicidal ideas. The patient is nervous/anxious.        Objective:      Physical Exam   Constitutional: She appears well-developed and well-nourished.   HENT:   Head: Normocephalic and atraumatic.   Cardiovascular: Normal rate, regular rhythm and normal heart sounds.    Pulmonary/Chest: Effort normal and breath sounds normal.   Psychiatric: She has a normal mood and affect.   Nursing note and vitals reviewed.      Assessment:       1. Bipolar 2 disorder    2. Fibromyalgia    3. Chronic fatigue syndrome    4. Migraine without status migrainosus, not intractable, unspecified migraine type        Plan:       Bipolar 2 disorder  -     Ambulatory referral to Home Health  -     Ambulatory referral to Outpatient Case Management  -     CBC auto differential; Future; Expected date: 10/23/2017  -     Comprehensive metabolic panel; Future; Expected date: 10/23/2017  -     TSH; Future; Expected date: 10/23/2017    Fibromyalgia  -     Ambulatory referral to Home Health  -     Ambulatory referral to Outpatient Case Management  -     CBC auto differential; Future; Expected date: 10/23/2017  -     Comprehensive metabolic panel; Future; Expected date: 10/23/2017  -     TSH; Future; Expected date:  10/23/2017    Chronic fatigue syndrome  -     Ambulatory referral to Home Health  -     Ambulatory referral to Outpatient Case Management  -     CBC auto differential; Future; Expected date: 10/23/2017  -     Comprehensive metabolic panel; Future; Expected date: 10/23/2017  -     TSH; Future; Expected date: 10/23/2017    Migraine without status migrainosus, not intractable, unspecified migraine type  -     Ambulatory referral to Home Health    Other orders  -     Cancel: vitamin D 1000 units Tab; Take 1 tablet (1,000 Units total) by mouth once daily.      Try for home health per pt request due to worsened depression and fibromyalgia.  Labs already ordered and f/u with Dr. St as planned.  F/u with psych as planned as well.  Call for worsening.  Flu shot today.  Return in about 6 months (around 4/23/2018), or if symptoms worsen or fail to improve.

## 2017-11-17 DIAGNOSIS — R00.0 TACHYCARDIA: ICD-10-CM

## 2017-11-17 RX ORDER — METOPROLOL SUCCINATE 25 MG/1
TABLET, EXTENDED RELEASE ORAL
Qty: 90 TABLET | Refills: 3 | Status: SHIPPED | OUTPATIENT
Start: 2017-11-17 | End: 2018-11-29 | Stop reason: SDUPTHER

## 2017-11-17 NOTE — PROGRESS NOTES
Refill Authorization Note     is requesting a refill authorization.    Brief assessment and rationale for refill: APPROVE: prr  Amount/Quantity of medication ordered: 90d         Refills Authorized: Yes  If authorized number of refills: 3           Medication Therapy Plan: Recently seen; using for tachycardia.  BP low however.  Approve 12 mo   Name and strength of medication: METOPROLOL SUCCINATE ER 25MG TB24  How patient will take medication: t1t po daily   Medication reconciliation completed: No  Comments:   BP Readings from Last 3 Encounters:   10/23/17 (!) 90/58   10/06/17 129/64   09/25/17 96/60      Pulse Readings from Last 3 Encounters:   10/23/17 94   10/06/17 107   09/25/17 99

## 2017-11-22 RX ORDER — BUTALBITAL, ACETAMINOPHEN AND CAFFEINE 50; 325; 40 MG/1; MG/1; MG/1
1 TABLET ORAL EVERY 6 HOURS PRN
Qty: 30 TABLET | Refills: 1 | Status: SHIPPED | OUTPATIENT
Start: 2017-11-22 | End: 2017-12-22

## 2017-11-30 ENCOUNTER — PATIENT MESSAGE (OUTPATIENT)
Dept: RHEUMATOLOGY | Facility: CLINIC | Age: 37
End: 2017-11-30

## 2017-11-30 DIAGNOSIS — E55.9 VITAMIN D DEFICIENCY: Primary | ICD-10-CM

## 2017-12-04 RX ORDER — ERGOCALCIFEROL 1.25 MG/1
50000 CAPSULE ORAL
Qty: 12 CAPSULE | Refills: 0 | Status: SHIPPED | OUTPATIENT
Start: 2017-12-04 | End: 2018-02-20

## 2017-12-06 ENCOUNTER — OUTPATIENT CASE MANAGEMENT (OUTPATIENT)
Dept: ADMINISTRATIVE | Facility: OTHER | Age: 37
End: 2017-12-06

## 2017-12-06 NOTE — PROGRESS NOTES
Please note the following patient's information has been forwarded to the [employer] for Case Management or .     Please see patient's  for additional referral information.    Please contact Outpatient Case Management at Ext. [phone] with questions.    Thank you,    Niki Day, SSC

## 2017-12-12 ENCOUNTER — OFFICE VISIT (OUTPATIENT)
Dept: RHEUMATOLOGY | Facility: CLINIC | Age: 37
End: 2017-12-12
Payer: MEDICAID

## 2017-12-12 VITALS
HEART RATE: 80 BPM | DIASTOLIC BLOOD PRESSURE: 70 MMHG | WEIGHT: 103.63 LBS | HEIGHT: 62 IN | BODY MASS INDEX: 19.07 KG/M2 | SYSTOLIC BLOOD PRESSURE: 100 MMHG

## 2017-12-12 DIAGNOSIS — G93.32 CHRONIC FATIGUE SYNDROME: ICD-10-CM

## 2017-12-12 DIAGNOSIS — M06.4 INFLAMMATORY POLYARTHRITIS: Primary | ICD-10-CM

## 2017-12-12 DIAGNOSIS — M79.7 FIBROMYALGIA: ICD-10-CM

## 2017-12-12 PROCEDURE — 99214 OFFICE O/P EST MOD 30 MIN: CPT | Mod: S$PBB,,, | Performed by: INTERNAL MEDICINE

## 2017-12-12 PROCEDURE — 99213 OFFICE O/P EST LOW 20 MIN: CPT | Mod: PBBFAC,PO | Performed by: INTERNAL MEDICINE

## 2017-12-12 PROCEDURE — 99999 PR PBB SHADOW E&M-EST. PATIENT-LVL III: CPT | Mod: PBBFAC,,, | Performed by: INTERNAL MEDICINE

## 2017-12-12 RX ORDER — METHOCARBAMOL 500 MG/1
500 TABLET, FILM COATED ORAL 2 TIMES DAILY PRN
Qty: 60 TABLET | Refills: 6 | Status: SHIPPED | OUTPATIENT
Start: 2017-12-12 | End: 2019-02-18 | Stop reason: SDUPTHER

## 2017-12-12 ASSESSMENT — ROUTINE ASSESSMENT OF PATIENT INDEX DATA (RAPID3)
PSYCHOLOGICAL DISTRESS SCORE: 5.5
PATIENT GLOBAL ASSESSMENT SCORE: 6.5
TOTAL RAPID3 SCORE: 5.17
PAIN SCORE: 5
MDHAQ FUNCTION SCORE: 1.2

## 2017-12-12 NOTE — PROGRESS NOTES
Subjective:          Chief Complaint: Dorothy Jesus is a 37 y.o. female who had concerns including Disease Management.    HPI:    With a positive LENA trial of prednisone did show significant improvement in her overall symptoms consistent or at least suggestive of an undifferentiated connective tissue disease slight elevation in her CRP normal sedimentation rate but did have benefit on prednisone.  When she comes off prednisone everything seems to regress at her baseline.   She failed HCQ with no response  MTX made her ill/nausea.  SSZ 2gm daily and tolerating not having much appreciable benefit.   Still on Prednisone 5mg daily and still noting benefit.   Pred on 2.5 mg good with self care, up all day and participate in some  family activity  Pred on 5mg even more active, play dates, cooking.   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 No cardiac manifestations.   She keeps good journal on symptoms: seems to be worse in the evenings. Associated with poor sleep for the morning.       Not typically using every day, but as of late. On a good day 30% help, on average 10%,Ketoprofen not helping as much for aches as once did.  Valentina was recently written by   Felix PRN does help for bad days  She notes with  on full time employment stress much reduced which makes symptoms much more tolerable.        She has increased her activity level trying to eat healthier.  In the past she states she was getting some relief of myalgias and arthralgias on ketoprofen.  He completed the vitamin D 12 weeks her psychiatrist has recently adjusted her medications slightly and this seems to be helping a great deal.  Interestingly she noted with the adjustment in her ADHD meds her myalgias have improved.Patient is a 34 y/o female referred for +LENA  1:80 homogeneous with repeat serologies + 1:320 negative LENA profile.  with myalgias, brain fog. Present for few years worse in past 2 years, affecting daily living/function.  "Patient did suffer from worsening of her symptoms after trying gabapentin, she did increase dose as we discussed but this only exacerbated her complaints. She did have worseing depression through the winter. Recent job did not work out for her, which was frustrating.  did recently get full time job.    Exacerbates with menstrual cycles. Patient has a non-restorative sleep pattern, no snoring. She has had sleep study-[place] negative for narcoplepsy. Patient was given Provigil exacerbated anxiety. Ibuprofen 600mg no help. APAP no help, naproxen no help.       Patient was on lamictal for nearly 8 years d/c'd for suspected myalgias. Recently (few weeks ago) trialed lithium did not tolerate. Zyprexa, Geodon, topamax and risperdol. Wellbutrin has been the best medication for her as she continues to feel "human". She is having more myalgias, and some arthralgias some good days some bad days. She cannot see a pattern of morning stiffness.     Component      Latest Ref Rng & Units 4/13/2017 12/10/2015   Anti Sm Antibody      0.00 - 19.99 EU 0.35 0.43   Anti-Sm Interpretation      Negative Negative Negative   Anti-SSA Antibody      0.00 - 19.99 EU 0.97 1.62   Anti-SSA Interpretation      Negative Negative Negative   Anti-SSB Antibody      0.00 - 19.99 EU 0.35 0.00   Anti-SSB Interpretation      Negative Negative Negative   ds DNA Ab      Negative 1:10 Negative 1:10 Negative 1:10   Anti Sm/RNP Antibody      0.00 - 19.99 EU 0.79 0.86   Anti-Sm/RNP Interpretation      Negative Negative Negative   Complement (C-3)      50 - 180 mg/dL  108   Complement (C-4)      11 - 44 mg/dL  27   CPK      20 - 180 U/L  50   LENA Screen      Negative <1:160 Positive (A)    LENA HEP-2 Titer       Positive 1:320 Homogeneous        REVIEW OF SYSTEMS:    Review of Systems   Constitutional: Positive for malaise/fatigue. Negative for fever and weight loss.   HENT: Negative for sore throat.    Eyes: Negative for double vision, photophobia and " redness.   Respiratory: Negative for cough, shortness of breath and wheezing.    Cardiovascular: Negative for chest pain, palpitations and orthopnea.   Gastrointestinal: Negative for abdominal pain, constipation and diarrhea.   Genitourinary: Negative for dysuria, hematuria and urgency.   Musculoskeletal: Positive for joint pain and neck pain. Negative for back pain and myalgias.   Skin: Negative for rash.   Neurological: Negative for dizziness, tingling, focal weakness and headaches.   Endo/Heme/Allergies: Does not bruise/bleed easily.   Psychiatric/Behavioral: Negative for depression, hallucinations and suicidal ideas.               Objective:            Past Medical History:   Diagnosis Date    Anxiety     Bartholin's cyst     Bipolar 2 disorder     Chronic fatigue     Osteopenia      History reviewed. No pertinent family history.  Social History   Substance Use Topics    Smoking status: Never Smoker    Smokeless tobacco: Never Used    Alcohol use No      Comment: rare         Current Outpatient Prescriptions on File Prior to Visit   Medication Sig Dispense Refill    alprazolam (XANAX) 0.5 MG tablet Take by mouth 2 (two) times daily as needed for Anxiety.       buPROPion (WELLBUTRIN XL) 300 MG 24 hr tablet Take 150 mg by mouth once daily.       butalbital-acetaminophen-caffeine -40 mg (FIORICET, ESGIC) -40 mg per tablet Take 1 tablet by mouth every 6 (six) hours as needed for Pain or Headaches. 30 tablet 1    clonazePAM (KLONOPIN) 2 MG Tab every evening.       dextroamphetamine-amphetamine (ADDERALL XR) 30 MG 24 hr capsule Take 30 mg by mouth every morning.      dextroamphetamine-amphetamine (ADDERALL) 20 mg tablet Take 1 tablet by mouth once daily. Takes at one o'clock in afternoon. Takes 30 mg in morning.      ergocalciferol (ERGOCALCIFEROL) 50,000 unit Cap Take 1 capsule (50,000 Units total) by mouth every 7 days. 12 capsule 0    fluticasone (FLONASE) 50 mcg/actuation nasal spray 2  sprays by Each Nare route once daily. 1 Bottle 5    ketorolac (TORADOL) 10 mg tablet Take 1 tablet (10 mg total) by mouth daily as needed for Pain. 5 tablet 11    loratadine (CLARITIN) 10 mg tablet Take 10 mg by mouth once daily.      metoprolol succinate (TOPROL-XL) 25 MG 24 hr tablet TAKE ONE TABLET BY MOUTH ONCE DAILY 90 tablet 3    predniSONE (DELTASONE) 5 MG tablet Take 5 mg by mouth once daily.      sulfaSALAzine (AZULFIDINE) 500 mg Tab Take 2 tablets (1,000 mg total) by mouth 2 (two) times daily. 120 tablet 11    [DISCONTINUED] tizanidine (ZANAFLEX) 2 MG tablet Take 4 mg by mouth every 6 (six) hours as needed.      [DISCONTINUED] vitamin D 1000 units Tab Take 185 mg by mouth once daily.       No current facility-administered medications on file prior to visit.        Vitals:    12/12/17 1639   BP: 100/70   Pulse: 80       Physical Exam:    Physical Exam   Constitutional: She appears well-developed and well-nourished.   HENT:   Nose: No septal deviation.   Mouth/Throat: Mucous membranes are normal. No oral lesions.   Eyes: Pupils are equal, round, and reactive to light. Right conjunctiva is not injected. Left conjunctiva is not injected.   Neck: No JVD present. No thyroid mass and no thyromegaly present.   Cardiovascular: Normal rate, regular rhythm and normal pulses.    No edema   Pulmonary/Chest: Effort normal and breath sounds normal.   Abdominal: Soft. Normal appearance. There is no hepatosplenomegaly.   Musculoskeletal:        Right shoulder: She exhibits normal range of motion, no tenderness and no swelling.        Left shoulder: She exhibits normal range of motion, no tenderness and no swelling.        Right elbow: She exhibits normal range of motion and no swelling. No tenderness found.        Left elbow: She exhibits normal range of motion and no swelling. No tenderness found.        Right wrist: She exhibits normal range of motion, no tenderness and no swelling.        Left wrist: She exhibits  normal range of motion, no tenderness and no swelling.        Right hip: She exhibits normal range of motion, normal strength and no swelling.        Left hip: She exhibits normal range of motion, no tenderness and no swelling.        Right knee: She exhibits normal range of motion and no swelling. No tenderness found.        Left knee: She exhibits normal range of motion and no swelling. No tenderness found.        Right ankle: She exhibits normal range of motion and no swelling. No tenderness.        Left ankle: She exhibits normal range of motion and no swelling. No tenderness.   No synovitis of the MCP, PIP, DIP or wrist    18/18 FMS tender points   Lymphadenopathy:     She has no cervical adenopathy.     She has no axillary adenopathy.   Neurological: She has normal strength and normal reflexes.   Skin: Skin is dry and intact.   Psychiatric: She has a normal mood and affect.             Assessment:       Encounter Diagnoses   Name Primary?    Inflammatory polyarthritis Yes    Fibromyalgia     Chronic fatigue syndrome           Plan:        Inflammatory polyarthritis  -     CBC auto differential; Standing; Expected date: 12/12/2017  -     Comprehensive metabolic panel; Standing; Expected date: 12/12/2017  -     Sedimentation rate, manual; Standing; Expected date: 12/12/2017  -     C-reactive protein; Standing; Expected date: 12/12/2017    Fibromyalgia  -     methocarbamol (ROBAXIN) 500 MG Tab; Take 1 tablet (500 mg total) by mouth 2 (two) times daily as needed.  Dispense: 60 tablet; Refill: 6    Chronic fatigue syndrome      Continue Prednisone 5mg daily  No response with HCQ>   Intolerant MTX  SSZ doing well but too soon to note any benefit.   Trial with Robaxin want to see how this works relative to Tizanidine.     Return in about 4 months (around 4/12/2018).            40min consultation with greater than 50% spent in counseling, chart review and coordination of care. All questions answered.

## 2018-02-22 DIAGNOSIS — M79.7 FIBROMYALGIA: ICD-10-CM

## 2018-02-26 RX ORDER — METHOCARBAMOL 500 MG/1
TABLET, FILM COATED ORAL
Qty: 60 TABLET | Refills: 11 | Status: SHIPPED | OUTPATIENT
Start: 2018-02-26 | End: 2018-10-10 | Stop reason: SDUPTHER

## 2018-02-26 RX ORDER — KETOROLAC TROMETHAMINE 10 MG/1
TABLET, FILM COATED ORAL
Qty: 5 TABLET | Refills: 11 | Status: SHIPPED | OUTPATIENT
Start: 2018-02-26 | End: 2018-04-23

## 2018-04-23 ENCOUNTER — TELEPHONE (OUTPATIENT)
Dept: FAMILY MEDICINE | Facility: CLINIC | Age: 38
End: 2018-04-23

## 2018-04-23 ENCOUNTER — OFFICE VISIT (OUTPATIENT)
Dept: FAMILY MEDICINE | Facility: CLINIC | Age: 38
End: 2018-04-23
Payer: MEDICAID

## 2018-04-23 VITALS
OXYGEN SATURATION: 99 % | BODY MASS INDEX: 20.24 KG/M2 | WEIGHT: 110 LBS | TEMPERATURE: 99 F | DIASTOLIC BLOOD PRESSURE: 68 MMHG | HEIGHT: 62 IN | SYSTOLIC BLOOD PRESSURE: 92 MMHG | HEART RATE: 102 BPM

## 2018-04-23 DIAGNOSIS — G43.909 MIGRAINE WITHOUT STATUS MIGRAINOSUS, NOT INTRACTABLE, UNSPECIFIED MIGRAINE TYPE: Primary | ICD-10-CM

## 2018-04-23 DIAGNOSIS — M79.7 FIBROMYALGIA: ICD-10-CM

## 2018-04-23 PROCEDURE — 99999 PR PBB SHADOW E&M-EST. PATIENT-LVL III: CPT | Mod: PBBFAC,,, | Performed by: FAMILY MEDICINE

## 2018-04-23 PROCEDURE — 99214 OFFICE O/P EST MOD 30 MIN: CPT | Mod: S$PBB,,, | Performed by: FAMILY MEDICINE

## 2018-04-23 PROCEDURE — 99213 OFFICE O/P EST LOW 20 MIN: CPT | Mod: PBBFAC,PO | Performed by: FAMILY MEDICINE

## 2018-04-23 RX ORDER — DICLOFENAC SODIUM 50 MG/1
50 TABLET, DELAYED RELEASE ORAL 2 TIMES DAILY PRN
Qty: 30 TABLET | Refills: 0 | Status: SHIPPED | OUTPATIENT
Start: 2018-04-23 | End: 2018-05-02

## 2018-04-23 RX ORDER — CLONAZEPAM 1 MG/1
0.5 TABLET ORAL DAILY
COMMUNITY
Start: 2018-04-20 | End: 2021-02-25

## 2018-04-23 NOTE — TELEPHONE ENCOUNTER
Patient is being referred per Dr. Nguyen for Migraine without status migrainosus please contact patient for scheduling as I was unable to get an appointment.

## 2018-04-23 NOTE — PROGRESS NOTES
Subjective:       Patient ID: Dorothy Jesus is a 37 y.o. female.    Chief Complaint: Headache (started months ago, OTC drugs not working )    Here for f/u migraines.  Worse over last few months.       Headache    This is a recurrent problem. The current episode started 1 to 4 weeks ago. Pertinent negatives include no abdominal pain, coughing or fever.     Review of Systems   Constitutional: Positive for fatigue. Negative for chills and fever.   Respiratory: Negative for cough, chest tightness and shortness of breath.    Cardiovascular: Negative for chest pain, palpitations and leg swelling.   Gastrointestinal: Negative for abdominal distention and abdominal pain.   Endocrine: Negative for cold intolerance and heat intolerance.   Skin: Negative for rash.   Neurological: Positive for headaches.   Psychiatric/Behavioral: Negative for dysphoric mood. The patient is not nervous/anxious.        Objective:      Physical Exam   Constitutional: She appears well-developed and well-nourished.   HENT:   Head: Normocephalic and atraumatic.   Cardiovascular: Normal rate, regular rhythm and normal heart sounds.    Pulmonary/Chest: Effort normal and breath sounds normal.   Psychiatric: She has a normal mood and affect.   Nursing note and vitals reviewed.      Assessment:       1. Migraine without status migrainosus, not intractable, unspecified migraine type    2. Fibromyalgia        Plan:       Migraine without status migrainosus, not intractable, unspecified migraine type  -     Ambulatory referral to Neurology    Fibromyalgia    Other orders  -     diclofenac (VOLTAREN) 50 MG EC tablet; Take 1 tablet (50 mg total) by mouth 2 (two) times daily as needed.  Dispense: 30 tablet; Refill: 0      Refer to neurologist for assistance. Prn med for now.    Follow-up in about 3 months (around 7/23/2018), or if symptoms worsen or fail to improve.

## 2018-04-23 NOTE — TELEPHONE ENCOUNTER
Called pt to schedule new pt appointment. At this time we do not have any medicaid spots in our department. Pt was given [facility] main [facility] number to schedule an appointment. Pt verbalized understanding.

## 2018-05-02 ENCOUNTER — OFFICE VISIT (OUTPATIENT)
Dept: RHEUMATOLOGY | Facility: CLINIC | Age: 38
End: 2018-05-02
Payer: MEDICAID

## 2018-05-02 ENCOUNTER — TELEPHONE (OUTPATIENT)
Dept: PHARMACY | Facility: HOSPITAL | Age: 38
End: 2018-05-02

## 2018-05-02 VITALS
HEIGHT: 62 IN | BODY MASS INDEX: 20.37 KG/M2 | SYSTOLIC BLOOD PRESSURE: 98 MMHG | WEIGHT: 110.69 LBS | DIASTOLIC BLOOD PRESSURE: 61 MMHG | HEART RATE: 86 BPM

## 2018-05-02 DIAGNOSIS — R53.83 MALAISE AND FATIGUE: ICD-10-CM

## 2018-05-02 DIAGNOSIS — G93.32 CHRONIC FATIGUE SYNDROME: ICD-10-CM

## 2018-05-02 DIAGNOSIS — F31.81 BIPOLAR 2 DISORDER: ICD-10-CM

## 2018-05-02 DIAGNOSIS — M06.00 SERONEGATIVE RHEUMATOID ARTHRITIS: Primary | ICD-10-CM

## 2018-05-02 DIAGNOSIS — M06.4 INFLAMMATORY POLYARTHRITIS: ICD-10-CM

## 2018-05-02 DIAGNOSIS — M79.7 FIBROMYALGIA: ICD-10-CM

## 2018-05-02 DIAGNOSIS — R53.81 MALAISE AND FATIGUE: ICD-10-CM

## 2018-05-02 PROCEDURE — 99213 OFFICE O/P EST LOW 20 MIN: CPT | Mod: PBBFAC,PO | Performed by: INTERNAL MEDICINE

## 2018-05-02 PROCEDURE — 99999 PR PBB SHADOW E&M-EST. PATIENT-LVL III: CPT | Mod: PBBFAC,,, | Performed by: INTERNAL MEDICINE

## 2018-05-02 PROCEDURE — 99215 OFFICE O/P EST HI 40 MIN: CPT | Mod: S$PBB,,, | Performed by: INTERNAL MEDICINE

## 2018-05-02 RX ORDER — METHYLPREDNISOLONE ACETATE 40 MG/ML
40 INJECTION, SUSPENSION INTRA-ARTICULAR; INTRALESIONAL; INTRAMUSCULAR; SOFT TISSUE
Status: COMPLETED | OUTPATIENT
Start: 2018-05-02 | End: 2018-05-02

## 2018-05-02 RX ORDER — DICLOFENAC POTASSIUM 50 MG/1
TABLET, FILM COATED ORAL
COMMUNITY
Start: 2018-04-23 | End: 2018-10-10 | Stop reason: SDUPTHER

## 2018-05-02 RX ADMIN — METHYLPREDNISOLONE ACETATE 40 MG: 40 INJECTION, SUSPENSION INTRA-ARTICULAR; INTRALESIONAL; INTRAMUSCULAR; SOFT TISSUE at 12:05

## 2018-05-02 ASSESSMENT — ROUTINE ASSESSMENT OF PATIENT INDEX DATA (RAPID3)
PATIENT GLOBAL ASSESSMENT SCORE: 5.5
PAIN SCORE: 6.5
MDHAQ FUNCTION SCORE: 1.5
TOTAL RAPID3 SCORE: 5.66
PSYCHOLOGICAL DISTRESS SCORE: 5.5

## 2018-05-02 NOTE — PROGRESS NOTES
Subjective:          Chief Complaint: Dorothy Jesus is a 38 y.o. female who had concerns including inflammatory polyarthritis (4 month follow up).    HPI:    With a positive LENA trial of prednisone did show significant improvement in her overall symptoms consistent or at least suggestive of an undifferentiated connective tissue disease slight elevation in her CRP normal sedimentation rate but did have benefit on prednisone.  When she comes off prednisone everything seems to regress at her baseline. No rashes, some pain with respirations, no fevers but subjectively, fatigue in sun no rash.     She failed HCQ with no response  MTX made her ill/nausea.  SSZ 2gm daily and tolerating not having much appreciable benefit.   Still on Prednisone 5mg daily and still noting benefit.   Pred on 2.5 mg good with self care, up all day and participate in some  family activity  Pred on 5mg even more active, play dates, cooking.   Patient did try to wean prednisone but was unable to widespread aches and pains.       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 No cardiac manifestations.   She keeps good journal on symptoms: seems to be worse in the evenings. Associated with poor sleep for the morning.       Not typically using every day, but as of late. On a good day 30% help, on average 10%,Ketoprofen not helping as much for aches as once did.  Valentina was recently written by   Felix PRN does help for bad days  She notes with  on full time employment stress much reduced which makes symptoms much more tolerable.       FMS:     She has increased her activity level trying to eat healthier.  In the past she states she was getting some relief of myalgias and arthralgias on ketoprofen.  He completed the vitamin D 12 weeks her psychiatrist has recently adjusted her medications slightly and this seems to be helping a great deal.  Interestingly she noted with the adjustment in her ADHD meds her myalgias have  "improved.Patient is a 36 y/o female referred for +LENA  1:80 homogeneous with repeat serologies + 1:320 negative LENA profile.  with myalgias, brain fog. Present for few years worse in past 2 years, affecting daily living/function. Patient did suffer from worsening of her symptoms after trying gabapentin, she did increase dose as we discussed but this only exacerbated her complaints. She did have worseing depression through the winter. Recent job did not work out for her, which was frustrating.  did recently get full time job.    Exacerbates with menstrual cycles. Patient has a non-restorative sleep pattern, no snoring. She has had sleep study-[place] negative for narcoplepsy. Patient was given Provigil exacerbated anxiety.   Ibuprofen 600mg no help. APAP no help, naproxen no help.   Recently with cognitive impairment, ++migraines (unable to see Dr. Yao at this time, cannot drive to [employer]), extreme fatigue, dizzyness. She is more forgetful that typical.   Trying cataflam PRN migraine.      Patient was on lamictal for nearly 8 years d/c'd for suspected myalgias. Recently (few weeks ago) trialed lithium did not tolerate. Zyprexa, Geodon, topamax and risperdol. Wellbutrin has been the best medication for her as she continues to feel "human". She is having more myalgias, and some arthralgias some good days some bad days. She cannot see a pattern of morning stiffness.     Component      Latest Ref Rng & Units 4/13/2017 12/10/2015   Anti Sm Antibody      0.00 - 19.99 EU 0.35 0.43   Anti-Sm Interpretation      Negative Negative Negative   Anti-SSA Antibody      0.00 - 19.99 EU 0.97 1.62   Anti-SSA Interpretation      Negative Negative Negative   Anti-SSB Antibody      0.00 - 19.99 EU 0.35 0.00   Anti-SSB Interpretation      Negative Negative Negative   ds DNA Ab      Negative 1:10 Negative 1:10 Negative 1:10   Anti Sm/RNP Antibody      0.00 - 19.99 EU 0.79 0.86   Anti-Sm/RNP Interpretation      Negative Negative " Negative   Complement (C-3)      50 - 180 mg/dL  108   Complement (C-4)      11 - 44 mg/dL  27   CPK      20 - 180 U/L  50   LENA Screen      Negative <1:160 Positive (A)    LENA HEP-2 Titer       Positive 1:320 Homogeneous        REVIEW OF SYSTEMS:    Review of Systems   Constitutional: Positive for malaise/fatigue. Negative for fever and weight loss.   HENT: Negative for sore throat.    Eyes: Negative for double vision, photophobia and redness.   Respiratory: Negative for cough, shortness of breath and wheezing.    Cardiovascular: Negative for chest pain, palpitations and orthopnea.   Gastrointestinal: Negative for abdominal pain, constipation and diarrhea.   Genitourinary: Negative for dysuria, hematuria and urgency.   Musculoskeletal: Positive for joint pain and neck pain. Negative for back pain and myalgias.   Skin: Negative for rash.   Neurological: Negative for dizziness, tingling, focal weakness and headaches.   Endo/Heme/Allergies: Does not bruise/bleed easily.   Psychiatric/Behavioral: Negative for depression, hallucinations and suicidal ideas.               Objective:            Past Medical History:   Diagnosis Date    Anxiety     Bartholin's cyst     Bipolar 2 disorder     Chronic fatigue     Osteopenia      History reviewed. No pertinent family history.  Social History   Substance Use Topics    Smoking status: Never Smoker    Smokeless tobacco: Never Used    Alcohol use No      Comment: rare         Current Outpatient Prescriptions on File Prior to Visit   Medication Sig Dispense Refill    alprazolam (XANAX) 0.5 MG tablet Take by mouth 2 (two) times daily as needed for Anxiety.       buPROPion (WELLBUTRIN XL) 300 MG 24 hr tablet Take 150 mg by mouth once daily.       clonazePAM (KLONOPIN) 1 MG tablet       dextroamphetamine-amphetamine (ADDERALL XR) 30 MG 24 hr capsule Take 30 mg by mouth every morning.      dextroamphetamine-amphetamine (ADDERALL) 20 mg tablet Take 1 tablet by mouth once  daily. Takes at one o'clock in afternoon. Takes 30 mg in morning.      fluticasone (FLONASE) 50 mcg/actuation nasal spray 2 sprays by Each Nare route once daily. 1 Bottle 5    methocarbamol (ROBAXIN) 500 MG Tab TAKE 1 TABLET (500MG) BY MOUTH TWO TIMES A DAY AS NEEDED 60 tablet 11    metoprolol succinate (TOPROL-XL) 25 MG 24 hr tablet TAKE ONE TABLET BY MOUTH ONCE DAILY 90 tablet 3    predniSONE (DELTASONE) 5 MG tablet Take 5 mg by mouth once daily.      sulfaSALAzine (AZULFIDINE) 500 mg Tab Take 2 tablets (1,000 mg total) by mouth 2 (two) times daily. 120 tablet 11    [DISCONTINUED] diclofenac (VOLTAREN) 50 MG EC tablet Take 1 tablet (50 mg total) by mouth 2 (two) times daily as needed. 30 tablet 0    loratadine (CLARITIN) 10 mg tablet Take 10 mg by mouth once daily.       No current facility-administered medications on file prior to visit.        Vitals:    05/02/18 1102   BP: 98/61   Pulse: 86       Physical Exam:    Physical Exam   Constitutional: She appears well-developed and well-nourished.   HENT:   Nose: No septal deviation.   Mouth/Throat: Mucous membranes are normal. No oral lesions.   Eyes: Pupils are equal, round, and reactive to light. Right conjunctiva is not injected. Left conjunctiva is not injected.   Neck: No JVD present. No thyroid mass and no thyromegaly present.   Cardiovascular: Normal rate, regular rhythm and normal pulses.    No edema   Pulmonary/Chest: Effort normal and breath sounds normal.   Abdominal: Soft. Normal appearance. There is no hepatosplenomegaly.   Musculoskeletal:        Right shoulder: She exhibits normal range of motion, no tenderness and no swelling.        Left shoulder: She exhibits normal range of motion, no tenderness and no swelling.        Right elbow: She exhibits normal range of motion and no swelling. No tenderness found.        Left elbow: She exhibits normal range of motion and no swelling. No tenderness found.        Right wrist: She exhibits normal range  of motion, no tenderness and no swelling.        Left wrist: She exhibits normal range of motion, no tenderness and no swelling.        Right hip: She exhibits normal range of motion, normal strength and no swelling.        Left hip: She exhibits normal range of motion, no tenderness and no swelling.        Right knee: She exhibits normal range of motion and no swelling. No tenderness found.        Left knee: She exhibits normal range of motion and no swelling. No tenderness found.        Right ankle: She exhibits normal range of motion and no swelling. No tenderness.        Left ankle: She exhibits normal range of motion and no swelling. No tenderness.   No synovitis of the MCP, PIP, DIP or wrist    18/18 FMS tender points   Lymphadenopathy:     She has no cervical adenopathy.     She has no axillary adenopathy.   Neurological: She has normal strength and normal reflexes.   Skin: Skin is dry and intact.   Psychiatric: She has a normal mood and affect.             Assessment:       Encounter Diagnoses   Name Primary?    Seronegative rheumatoid arthritis Yes    Inflammatory polyarthritis     Fibromyalgia     Chronic fatigue syndrome     Bipolar 2 disorder     Malaise and fatigue           Plan:        Seronegative rheumatoid arthritis  -     adalimumab (HUMIRA PEN) PnKt injection; Inject 0.8 mLs (40 mg total) into the skin every 14 (fourteen) days.  Dispense: 2 each; Refill: 11  -     methylPREDNISolone acetate injection 40 mg; Inject 1 mL (40 mg total) into the muscle one time.  -     Comprehensive metabolic panel; Standing; Expected date: 05/02/2018  -     CBC auto differential; Standing; Expected date: 05/02/2018  -     Sedimentation rate, manual; Standing; Expected date: 05/02/2018  -     C-reactive protein; Standing; Expected date: 05/02/2018    Inflammatory polyarthritis    Fibromyalgia    Chronic fatigue syndrome    Bipolar 2 disorder    Malaise and fatigue  -     Quantiferon Gold TB; Future; Expected  date: 05/02/2018      Continue Prednisone 5mg daily  No response with HCQ>   Intolerant MTX  SSZ tolerating but no significant difference when we held it for few weeks.  Will DC sulfasalazine today  Start Humira  : Firm gold  The Medrol 40 mg  Trial with Robaxin want to see how this works relative to Tizanidine.     No Follow-up on file.            40min consultation with greater than 50% spent in counseling, chart review and coordination of care. All questions answered.

## 2018-05-02 NOTE — TELEPHONE ENCOUNTER
Notified the patient we received the prescription for Humira, and it will require authorization from the insurance company. Patient voiced understanding.

## 2018-05-03 ENCOUNTER — LAB VISIT (OUTPATIENT)
Dept: LAB | Facility: HOSPITAL | Age: 38
End: 2018-05-03
Attending: INTERNAL MEDICINE
Payer: MEDICAID

## 2018-05-03 DIAGNOSIS — M06.00 SERONEGATIVE RHEUMATOID ARTHRITIS: ICD-10-CM

## 2018-05-03 LAB
ALBUMIN SERPL BCP-MCNC: 4.3 G/DL
ALP SERPL-CCNC: 74 U/L
ALT SERPL W/O P-5'-P-CCNC: 26 U/L
ANION GAP SERPL CALC-SCNC: 9 MMOL/L
AST SERPL-CCNC: 34 U/L
BASOPHILS # BLD AUTO: 0.06 K/UL
BASOPHILS NFR BLD: 0.6 %
BILIRUB SERPL-MCNC: 0.4 MG/DL
BUN SERPL-MCNC: 12 MG/DL
CALCIUM SERPL-MCNC: 9.5 MG/DL
CHLORIDE SERPL-SCNC: 106 MMOL/L
CO2 SERPL-SCNC: 27 MMOL/L
CREAT SERPL-MCNC: 0.8 MG/DL
CRP SERPL-MCNC: 1.6 MG/L
DIFFERENTIAL METHOD: ABNORMAL
EOSINOPHIL # BLD AUTO: 0.1 K/UL
EOSINOPHIL NFR BLD: 1.2 %
ERYTHROCYTE [DISTWIDTH] IN BLOOD BY AUTOMATED COUNT: 12.1 %
ERYTHROCYTE [SEDIMENTATION RATE] IN BLOOD BY WESTERGREN METHOD: 2 MM/HR
EST. GFR  (AFRICAN AMERICAN): >60 ML/MIN/1.73 M^2
EST. GFR  (NON AFRICAN AMERICAN): >60 ML/MIN/1.73 M^2
GLUCOSE SERPL-MCNC: 86 MG/DL
HCT VFR BLD AUTO: 41 %
HGB BLD-MCNC: 13.4 G/DL
IMM GRANULOCYTES # BLD AUTO: 0.04 K/UL
IMM GRANULOCYTES NFR BLD AUTO: 0.4 %
LYMPHOCYTES # BLD AUTO: 1.9 K/UL
LYMPHOCYTES NFR BLD: 18 %
MCH RBC QN AUTO: 32.8 PG
MCHC RBC AUTO-ENTMCNC: 32.7 G/DL
MCV RBC AUTO: 100 FL
MONOCYTES # BLD AUTO: 0.6 K/UL
MONOCYTES NFR BLD: 5.5 %
NEUTROPHILS # BLD AUTO: 7.9 K/UL
NEUTROPHILS NFR BLD: 74.3 %
NRBC BLD-RTO: 0 /100 WBC
PLATELET # BLD AUTO: 212 K/UL
PMV BLD AUTO: 11.5 FL
POTASSIUM SERPL-SCNC: 4.2 MMOL/L
PROT SERPL-MCNC: 7.1 G/DL
RBC # BLD AUTO: 4.09 M/UL
SODIUM SERPL-SCNC: 142 MMOL/L
WBC # BLD AUTO: 10.66 K/UL

## 2018-05-03 PROCEDURE — 85025 COMPLETE CBC W/AUTO DIFF WBC: CPT

## 2018-05-03 PROCEDURE — 80053 COMPREHEN METABOLIC PANEL: CPT

## 2018-05-03 PROCEDURE — 86140 C-REACTIVE PROTEIN: CPT

## 2018-05-03 PROCEDURE — 85651 RBC SED RATE NONAUTOMATED: CPT | Mod: PO

## 2018-05-08 ENCOUNTER — PATIENT MESSAGE (OUTPATIENT)
Dept: RHEUMATOLOGY | Facility: CLINIC | Age: 38
End: 2018-05-08

## 2018-05-09 NOTE — TELEPHONE ENCOUNTER
DOCUMENTATION ONLY:  Prior authorization for Humira approved from 5/9/18 to 9/9/18    Co-pay: $0    Patient Assistance is not required

## 2018-05-21 ENCOUNTER — TELEPHONE (OUTPATIENT)
Dept: PHARMACY | Facility: CLINIC | Age: 38
End: 2018-05-21

## 2018-05-21 NOTE — TELEPHONE ENCOUNTER
Initial Humira 40mg/0.8ml Pen Injection consult completed on 18.Humira 40mg/0.8ml Pen Injection will be shipped on 18 to arrive at patient's home on 18 via [employer]. $ 0 copay. Patient will start Humira 40mg/0.8ml Pen Injection on 18. Address confirmed, CC on file. Confirmed 2 patient identifiers - name and . Therapy Appropriate.    Counseled patient on administration directions:  - Inject Humira 40mg (1pen) into the skin every 14 days.- Take out of the refrigerator 30-60 minutes prior to injection.  - Wash hands before and after injection.  - Monthly RX will come with gauze, bandaids, and alcohol swabs.  - Patient may inject in either the tops of the thighs, abdomen- but at least 2 inches away from her belly button, or the outer part of her upper arm.  Patient was instructed to rotate injections sites.  - Patient is to wipe down the injection site with the alcohol pad, wait to dry.  Gently squeeze the area of the cleaned skin and hold it firmly.   Place the pen flat against the raised area of skin that is being squeezed, then push down on the button and hold for 10-15 seconds, until the window has gone from clear to yellow.  - Patient should rotate injection sites.   - Patient will use sharps container; once full, per [place] law, she/ he may lock the sharps container and place in her trash. She can then contact the Pharmacy and we will replace the sharps at no additional charge.    Patient was counseled on possible side effects:  - Injection site reaction: redness, soreness, itching, bruising, which should resolve within 3-5 days.   - Increased risk for infections.  If patient becomes sick, patient is to hold Humira  use until she is better.     DDIs:  Medication list reviewed and potential DDIs addressed.    Patient verbalized understanding. Compliance stressed. Patient advised to keep a calendar marking dates of injections to ensure better compliance. Patient advised to call myself or provider  should any questions arise. Patient plans to start Humira on 5/24/18. Consultation included: indication; goals of treatment; administration; storage and handling; side effects; how to handle side effects; the importance of compliance; how to handle missed doses; the importance of laboratory monitoring; the importance of keeping all follow up appointments.  Patient understands to report any medication changes to [facility] and provider. All questions answered and addressed to patients satisfaction. I will f/u with her in 1 week from start, [facility] to contact patient in 3 weeks for refills.     Katelyn Barr, PharmD  [facility]  [phone]

## 2018-06-13 ENCOUNTER — TELEPHONE (OUTPATIENT)
Dept: PHARMACY | Facility: CLINIC | Age: 38
End: 2018-06-13

## 2018-07-12 ENCOUNTER — TELEPHONE (OUTPATIENT)
Dept: PHARMACY | Facility: CLINIC | Age: 38
End: 2018-07-12

## 2018-08-07 ENCOUNTER — TELEPHONE (OUTPATIENT)
Dept: PHARMACY | Facility: CLINIC | Age: 38
End: 2018-08-07

## 2018-08-08 NOTE — TELEPHONE ENCOUNTER
"During routine refill call, Ms. Jesus reported that she believes the Humira is giving her mood swings and causing her to be extremely irritable and short-tempered. She is happy with the number of "good days" she's had since starting the medication though she hasn't returned to her previous "normal."     I recommended she track her mood in her RA journal/calendar. She was uncertain whether the mood swings were diminishing over time or not. We will follow up with her again in a month to see how everything is going. Mood disturbance is not a common side effect with Humira. Her next appt is in October.   "

## 2018-08-24 ENCOUNTER — TELEPHONE (OUTPATIENT)
Dept: PHARMACY | Facility: CLINIC | Age: 38
End: 2018-08-24

## 2018-08-24 NOTE — TELEPHONE ENCOUNTER
Patient called and stated that she missed her dose for Humira. She normally injects the Humira on Wed every 2 weeks. She went ahead and gave herself the injection today as soon as she remembered, but had questions about when she should take her next dose. We discussed that since she took the Humira injection today and does not take the next dose for another couple of weeks, she should resume on her regular Wed schedule in 2 weeks. Patient confirmed understanding and did not have any further questions or concerns.

## 2018-09-04 ENCOUNTER — TELEPHONE (OUTPATIENT)
Dept: PHARMACY | Facility: CLINIC | Age: 38
End: 2018-09-04

## 2018-09-10 ENCOUNTER — PATIENT MESSAGE (OUTPATIENT)
Dept: PHARMACY | Facility: CLINIC | Age: 38
End: 2018-09-10

## 2018-09-14 NOTE — TELEPHONE ENCOUNTER
Patient has 0 doses remaining. Patient has not started any new medications and no side effects. Patient missed her dose due on 18 due to illness. Patient taking medication as prescribed, no changes in direction. Patient did not have any concerns or questions for the pharmacist at this time.  & address verified

## 2018-09-16 RX ORDER — PREDNISONE 5 MG/1
TABLET ORAL
Qty: 90 TABLET | Refills: 3 | Status: SHIPPED | OUTPATIENT
Start: 2018-09-16 | End: 2019-09-23 | Stop reason: SDUPTHER

## 2018-10-09 ENCOUNTER — TELEPHONE (OUTPATIENT)
Dept: PHARMACY | Facility: CLINIC | Age: 38
End: 2018-10-09

## 2018-10-10 ENCOUNTER — LAB VISIT (OUTPATIENT)
Dept: LAB | Facility: HOSPITAL | Age: 38
End: 2018-10-10
Attending: INTERNAL MEDICINE
Payer: MEDICAID

## 2018-10-10 ENCOUNTER — OFFICE VISIT (OUTPATIENT)
Dept: RHEUMATOLOGY | Facility: CLINIC | Age: 38
End: 2018-10-10
Payer: MEDICAID

## 2018-10-10 VITALS
HEIGHT: 62 IN | DIASTOLIC BLOOD PRESSURE: 70 MMHG | SYSTOLIC BLOOD PRESSURE: 130 MMHG | WEIGHT: 106.06 LBS | HEART RATE: 64 BPM | BODY MASS INDEX: 19.52 KG/M2

## 2018-10-10 DIAGNOSIS — G93.32 CHRONIC FATIGUE SYNDROME: ICD-10-CM

## 2018-10-10 DIAGNOSIS — M06.9 RHEUMATOID ARTHRITIS INVOLVING MULTIPLE SITES, UNSPECIFIED RHEUMATOID FACTOR PRESENCE: Primary | ICD-10-CM

## 2018-10-10 DIAGNOSIS — M79.7 FIBROMYALGIA: ICD-10-CM

## 2018-10-10 DIAGNOSIS — M06.00 SERONEGATIVE RHEUMATOID ARTHRITIS: ICD-10-CM

## 2018-10-10 LAB
ALBUMIN SERPL BCP-MCNC: 4.5 G/DL
ALP SERPL-CCNC: 67 U/L
ALT SERPL W/O P-5'-P-CCNC: 13 U/L
ANION GAP SERPL CALC-SCNC: 8 MMOL/L
AST SERPL-CCNC: 17 U/L
BASOPHILS # BLD AUTO: 0.07 K/UL
BASOPHILS NFR BLD: 0.7 %
BILIRUB SERPL-MCNC: 0.5 MG/DL
BUN SERPL-MCNC: 10 MG/DL
CALCIUM SERPL-MCNC: 10.1 MG/DL
CHLORIDE SERPL-SCNC: 105 MMOL/L
CO2 SERPL-SCNC: 28 MMOL/L
CREAT SERPL-MCNC: 1 MG/DL
CRP SERPL-MCNC: 0.2 MG/L
DIFFERENTIAL METHOD: ABNORMAL
EOSINOPHIL # BLD AUTO: 0 K/UL
EOSINOPHIL NFR BLD: 0.4 %
ERYTHROCYTE [DISTWIDTH] IN BLOOD BY AUTOMATED COUNT: 12.9 %
ERYTHROCYTE [SEDIMENTATION RATE] IN BLOOD BY WESTERGREN METHOD: 1 MM/HR
EST. GFR  (AFRICAN AMERICAN): >60 ML/MIN/1.73 M^2
EST. GFR  (NON AFRICAN AMERICAN): >60 ML/MIN/1.73 M^2
GLUCOSE SERPL-MCNC: 113 MG/DL
HCT VFR BLD AUTO: 42.2 %
HGB BLD-MCNC: 14 G/DL
IMM GRANULOCYTES # BLD AUTO: 0.03 K/UL
IMM GRANULOCYTES NFR BLD AUTO: 0.3 %
LYMPHOCYTES # BLD AUTO: 2.9 K/UL
LYMPHOCYTES NFR BLD: 28.7 %
MCH RBC QN AUTO: 31.7 PG
MCHC RBC AUTO-ENTMCNC: 33.2 G/DL
MCV RBC AUTO: 96 FL
MONOCYTES # BLD AUTO: 0.5 K/UL
MONOCYTES NFR BLD: 4.8 %
NEUTROPHILS # BLD AUTO: 6.5 K/UL
NEUTROPHILS NFR BLD: 65.1 %
NRBC BLD-RTO: 0 /100 WBC
PLATELET # BLD AUTO: 246 K/UL
PMV BLD AUTO: 12.5 FL
POTASSIUM SERPL-SCNC: 4.4 MMOL/L
PROT SERPL-MCNC: 7.2 G/DL
RBC # BLD AUTO: 4.42 M/UL
SODIUM SERPL-SCNC: 141 MMOL/L
WBC # BLD AUTO: 9.98 K/UL

## 2018-10-10 PROCEDURE — 86140 C-REACTIVE PROTEIN: CPT

## 2018-10-10 PROCEDURE — 80053 COMPREHEN METABOLIC PANEL: CPT

## 2018-10-10 PROCEDURE — 99999 PR PBB SHADOW E&M-EST. PATIENT-LVL III: CPT | Mod: PBBFAC,,, | Performed by: INTERNAL MEDICINE

## 2018-10-10 PROCEDURE — 85025 COMPLETE CBC W/AUTO DIFF WBC: CPT

## 2018-10-10 PROCEDURE — 99213 OFFICE O/P EST LOW 20 MIN: CPT | Mod: PBBFAC,PO | Performed by: INTERNAL MEDICINE

## 2018-10-10 PROCEDURE — 85651 RBC SED RATE NONAUTOMATED: CPT | Mod: PO

## 2018-10-10 PROCEDURE — 99214 OFFICE O/P EST MOD 30 MIN: CPT | Mod: S$PBB,,, | Performed by: INTERNAL MEDICINE

## 2018-10-10 PROCEDURE — 36415 COLL VENOUS BLD VENIPUNCTURE: CPT | Mod: PO

## 2018-10-10 RX ORDER — METHOCARBAMOL 500 MG/1
TABLET, FILM COATED ORAL
Qty: 60 TABLET | Refills: 11 | Status: SHIPPED | OUTPATIENT
Start: 2018-10-10 | End: 2019-07-17

## 2018-10-10 RX ORDER — DICLOFENAC POTASSIUM 50 MG/1
50 TABLET, FILM COATED ORAL 2 TIMES DAILY PRN
Qty: 30 TABLET | Refills: 6 | Status: SHIPPED | OUTPATIENT
Start: 2018-10-10 | End: 2019-10-10

## 2018-10-10 RX ORDER — DEXTROAMPHETAMINE SACCHARATE, AMPHETAMINE ASPARTATE MONOHYDRATE, DEXTROAMPHETAMINE SULFATE AND AMPHETAMINE SULFATE 5; 5; 5; 5 MG/1; MG/1; MG/1; MG/1
20 CAPSULE, EXTENDED RELEASE ORAL EVERY MORNING
COMMUNITY

## 2018-10-10 NOTE — PROGRESS NOTES
Subjective:          Chief Complaint: Dorothy Jesus is a 38 y.o. female who had concerns including Disease Management.    HPI:    With a positive LENA trial of prednisone did show significant improvement in her overall symptoms consistent or at least suggestive of an undifferentiated connective tissue disease slight elevation in her CRP normal sedimentation rate but did have benefit on prednisone.  When she comes off prednisone everything seems to regress at her baseline. No rashes, some pain with respirations, no fevers but subjectively, fatigue in sun no rash.   Here jacqui assess response to Humira.     She failed HCQ with no response  MTX made her ill/nausea.  SSZ 2gm daily and tolerating not having much appreciable benefit.   Still on Prednisone 5mg daily and still noting benefit.   Pred on 2.5 mg good with self care, up all day and participate in some  family activity  Pred on 5mg even more active, play dates, cooking.   Patient did try to wean prednisone but was unable to widespread aches and pains.       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 No cardiac manifestations.   She keeps good journal on symptoms: seems to be worse in the evenings. Associated with poor sleep for the morning.       Not typically using every day, but as of late. On a good day 30% help, on average 10%,Ketoprofen not helping as much for aches as once did.  Valentina was recently written by   Felix PRN does help for bad days  She notes with  on full time employment stress much reduced which makes symptoms much more tolerable.       FMS:     She has increased her activity level trying to eat healthier.  In the past she states she was getting some relief of myalgias and arthralgias on ketoprofen.  He completed the vitamin D 12 weeks her psychiatrist has recently adjusted her medications slightly and this seems to be helping a great deal.  Interestingly she noted with the adjustment in her ADHD meds her myalgias have  "improved.Patient is a 34 y/o female referred for +LENA  1:80 homogeneous with repeat serologies + 1:320 negative LENA profile.  with myalgias, brain fog. Present for few years worse in past 2 years, affecting daily living/function. Patient did suffer from worsening of her symptoms after trying gabapentin, she did increase dose as we discussed but this only exacerbated her complaints. She did have worseing depression through the winter. Recent job did not work out for her, which was frustrating.  did recently get full time job.    Exacerbates with menstrual cycles. Patient has a non-restorative sleep pattern, no snoring. She has had sleep study-[place] negative for narcoplepsy. Patient was given Provigil exacerbated anxiety.   Ibuprofen 600mg no help. APAP no help, naproxen no help.   Recently with cognitive impairment, ++migraines (unable to see Dr. Yao at this time, cannot drive to [employer]), extreme fatigue, dizzyness. She is more forgetful that typical.   Trying cataflam PRN migraine.      Patient was on lamictal for nearly 8 years d/c'd for suspected myalgias. Recently (few weeks ago) trialed lithium did not tolerate. Zyprexa, Geodon, topamax and risperdol. Wellbutrin has been the best medication for her as she continues to feel "human". She is having more myalgias, and some arthralgias some good days some bad days. She cannot see a pattern of morning stiffness.     Component      Latest Ref Rng & Units 4/13/2017 12/10/2015   Anti Sm Antibody      0.00 - 19.99 EU 0.35 0.43   Anti-Sm Interpretation      Negative Negative Negative   Anti-SSA Antibody      0.00 - 19.99 EU 0.97 1.62   Anti-SSA Interpretation      Negative Negative Negative   Anti-SSB Antibody      0.00 - 19.99 EU 0.35 0.00   Anti-SSB Interpretation      Negative Negative Negative   ds DNA Ab      Negative 1:10 Negative 1:10 Negative 1:10   Anti Sm/RNP Antibody      0.00 - 19.99 EU 0.79 0.86   Anti-Sm/RNP Interpretation      Negative Negative " Negative   Complement (C-3)      50 - 180 mg/dL  108   Complement (C-4)      11 - 44 mg/dL  27   CPK      20 - 180 U/L  50   ELNA Screen      Negative <1:160 Positive (A)    LENA HEP-2 Titer       Positive 1:320 Homogeneous        REVIEW OF SYSTEMS:    Review of Systems   Constitutional: Positive for malaise/fatigue. Negative for fever and weight loss.   HENT: Negative for sore throat.    Eyes: Negative for double vision, photophobia and redness.   Respiratory: Negative for cough, shortness of breath and wheezing.    Cardiovascular: Negative for chest pain, palpitations and orthopnea.   Gastrointestinal: Negative for abdominal pain, constipation and diarrhea.   Genitourinary: Negative for dysuria, hematuria and urgency.   Musculoskeletal: Positive for joint pain and neck pain. Negative for back pain and myalgias.   Skin: Negative for rash.   Neurological: Negative for dizziness, tingling, focal weakness and headaches.   Endo/Heme/Allergies: Does not bruise/bleed easily.   Psychiatric/Behavioral: Negative for depression, hallucinations and suicidal ideas.               Objective:            Past Medical History:   Diagnosis Date    Anxiety     Bartholin's cyst     Bipolar 2 disorder     Chronic fatigue     Osteopenia      History reviewed. No pertinent family history.  Social History     Tobacco Use    Smoking status: Never Smoker    Smokeless tobacco: Never Used   Substance Use Topics    Alcohol use: No     Comment: rare    Drug use: No         Current Outpatient Medications on File Prior to Visit   Medication Sig Dispense Refill    adalimumab (HUMIRA) PnKt injection Inject 0.8 mLs (40 mg total) into the skin every 14 (fourteen) days. 2 each 11    alprazolam (XANAX) 0.5 MG tablet Take by mouth 2 (two) times daily as needed for Anxiety.       buPROPion (WELLBUTRIN XL) 300 MG 24 hr tablet Take 150 mg by mouth once daily.       clonazePAM (KLONOPIN) 1 MG tablet       dextroamphetamine-amphetamine (ADDERALL  XR) 20 MG 24 hr capsule Take 20 mg by mouth once daily. At 1pm      dextroamphetamine-amphetamine (ADDERALL XR) 30 MG 24 hr capsule Take 30 mg by mouth every morning.      methocarbamol (ROBAXIN) 500 MG Tab TAKE 1 TABLET (500MG) BY MOUTH TWO TIMES A DAY AS NEEDED 60 tablet 11    metoprolol succinate (TOPROL-XL) 25 MG 24 hr tablet TAKE ONE TABLET BY MOUTH ONCE DAILY 90 tablet 3    predniSONE (DELTASONE) 5 MG tablet TAKE ONE TABLET (5 MG TOTAL) BY MOUTH ONCE DAILY 90 tablet 3    diclofenac (CATAFLAM) 50 MG tablet       fluticasone (FLONASE) 50 mcg/actuation nasal spray 2 sprays by Each Nare route once daily. 1 Bottle 5    loratadine (CLARITIN) 10 mg tablet Take 10 mg by mouth once daily.      [DISCONTINUED] dextroamphetamine-amphetamine (ADDERALL) 20 mg tablet Take 1 tablet by mouth once daily. Takes at one o'clock in afternoon. Takes 30 mg in morning.      [DISCONTINUED] predniSONE (DELTASONE) 5 MG tablet Take 5 mg by mouth once daily.      [DISCONTINUED] sulfaSALAzine (AZULFIDINE) 500 mg Tab Take 2 tablets (1,000 mg total) by mouth 2 (two) times daily. 120 tablet 11     No current facility-administered medications on file prior to visit.        Vitals:    10/10/18 1430   BP: 130/70   Pulse: 64       Physical Exam:    Physical Exam   Constitutional: She appears well-developed and well-nourished.   HENT:   Nose: No septal deviation.   Mouth/Throat: Mucous membranes are normal. No oral lesions.   Eyes: Pupils are equal, round, and reactive to light. Right conjunctiva is not injected. Left conjunctiva is not injected.   Neck: No JVD present. No thyroid mass and no thyromegaly present.   Cardiovascular: Normal rate, regular rhythm and normal pulses.   No edema   Pulmonary/Chest: Effort normal and breath sounds normal.   Abdominal: Soft. Normal appearance. There is no hepatosplenomegaly.   Musculoskeletal:        Right shoulder: She exhibits normal range of motion, no tenderness and no swelling.        Left  shoulder: She exhibits normal range of motion, no tenderness and no swelling.        Right elbow: She exhibits normal range of motion and no swelling. No tenderness found.        Left elbow: She exhibits normal range of motion and no swelling. No tenderness found.        Right wrist: She exhibits normal range of motion, no tenderness and no swelling.        Left wrist: She exhibits normal range of motion, no tenderness and no swelling.        Right hip: She exhibits normal range of motion, normal strength and no swelling.        Left hip: She exhibits normal range of motion, no tenderness and no swelling.        Right knee: She exhibits normal range of motion and no swelling. No tenderness found.        Left knee: She exhibits normal range of motion and no swelling. No tenderness found.        Right ankle: She exhibits normal range of motion and no swelling. No tenderness.        Left ankle: She exhibits normal range of motion and no swelling. No tenderness.   No synovitis of the MCP, PIP, DIP or wrist    18/18 FMS tender points   Lymphadenopathy:     She has no cervical adenopathy.     She has no axillary adenopathy.   Neurological: She has normal strength and normal reflexes.   Skin: Skin is dry and intact.   Psychiatric: She has a normal mood and affect.             Assessment:       Encounter Diagnoses   Name Primary?    Rheumatoid arthritis involving multiple sites, unspecified rheumatoid factor presence Yes    Chronic fatigue syndrome     Fibromyalgia           Plan:        Rheumatoid arthritis involving multiple sites, unspecified rheumatoid factor presence  -     methocarbamol (ROBAXIN) 500 MG Tab; TAKE 1 TABLET (500MG) BY MOUTH TWO TIMES A DAY AS NEEDED  Dispense: 60 tablet; Refill: 11  -     diclofenac (CATAFLAM) 50 MG tablet; Take 1 tablet (50 mg total) by mouth 2 (two) times daily as needed.  Dispense: 30 tablet; Refill: 6  -     CBC auto differential; Standing; Expected date: 10/10/2018  -      Comprehensive metabolic panel; Standing; Expected date: 10/10/2018  -     Sedimentation rate; Standing; Expected date: 10/10/2018  -     C-reactive protein; Standing; Expected date: 10/10/2018    Chronic fatigue syndrome    Fibromyalgia  -     methocarbamol (ROBAXIN) 500 MG Tab; TAKE 1 TABLET (500MG) BY MOUTH TWO TIMES A DAY AS NEEDED  Dispense: 60 tablet; Refill: 11      Continue Prednisone 5mg now using PRN approx 3/7 days out of the week  No response with HCQ>   Intolerant MTX  SSZ tolerating but no significant difference when we held it for few weeks.    She is doing very well with Humira     cotinue working with Dr. Roblero on Adderal   Best she has looked in months.     Trial with Robaxin want to see how this works relative to Tizanidine.     Follow-up in about 4 months (around 2/10/2019).            30min consultation with greater than 50% spent in counseling, chart review and coordination of care. All questions answered.

## 2018-11-05 ENCOUNTER — TELEPHONE (OUTPATIENT)
Dept: PHARMACY | Facility: CLINIC | Age: 38
End: 2018-11-05

## 2018-11-29 DIAGNOSIS — R00.0 TACHYCARDIA: ICD-10-CM

## 2018-11-29 RX ORDER — METOPROLOL SUCCINATE 25 MG/1
TABLET, EXTENDED RELEASE ORAL
Qty: 90 TABLET | Refills: 3 | Status: SHIPPED | OUTPATIENT
Start: 2018-11-29 | End: 2020-03-25 | Stop reason: SDUPTHER

## 2018-12-04 ENCOUNTER — TELEPHONE (OUTPATIENT)
Dept: PHARMACY | Facility: CLINIC | Age: 38
End: 2018-12-04

## 2019-01-02 ENCOUNTER — TELEPHONE (OUTPATIENT)
Dept: PHARMACY | Facility: CLINIC | Age: 39
End: 2019-01-02

## 2019-02-07 ENCOUNTER — TELEPHONE (OUTPATIENT)
Dept: PHARMACY | Facility: CLINIC | Age: 39
End: 2019-02-07

## 2019-02-15 ENCOUNTER — TELEPHONE (OUTPATIENT)
Dept: RHEUMATOLOGY | Facility: CLINIC | Age: 39
End: 2019-02-15

## 2019-02-15 NOTE — TELEPHONE ENCOUNTER
----- Message from Tania Obrien PharmD sent at 2/15/2019 11:12 AM CST -----  Regarding: Humira  Dr St and Staff,    [facility] has been attempting to reach Ms. Jesus regarding prescription for Humira. After numerous unsuccessful attempts, we are sending a postcard to the address on file. At this point in time, no further contact will be made from [facility] until patient responds to our mail correspondence.    If there is anything else we can do to further assist, please let us know.     Thanks,  Tania Obrien, PharmD  Clinical Pharmacist  [facility]  [phone]

## 2019-02-18 DIAGNOSIS — M79.7 FIBROMYALGIA: ICD-10-CM

## 2019-02-18 DIAGNOSIS — R00.0 TACHYCARDIA: ICD-10-CM

## 2019-02-19 RX ORDER — METOPROLOL SUCCINATE 25 MG/1
TABLET, EXTENDED RELEASE ORAL
Qty: 90 TABLET | Refills: 3 | Status: SHIPPED | OUTPATIENT
Start: 2019-02-19 | End: 2020-07-16 | Stop reason: SDUPTHER

## 2019-02-21 RX ORDER — METHOCARBAMOL 500 MG/1
TABLET, FILM COATED ORAL
Qty: 60 TABLET | Refills: 6 | Status: SHIPPED | OUTPATIENT
Start: 2019-02-21 | End: 2019-07-17

## 2019-03-14 ENCOUNTER — TELEPHONE (OUTPATIENT)
Dept: PHARMACY | Facility: CLINIC | Age: 39
End: 2019-03-14

## 2019-04-03 ENCOUNTER — PATIENT MESSAGE (OUTPATIENT)
Dept: RHEUMATOLOGY | Facility: CLINIC | Age: 39
End: 2019-04-03

## 2019-04-03 NOTE — TELEPHONE ENCOUNTER
Patient returned call & confirmed need of Humira refill. Verified 2 patient identifiers. Dosage confirmed. $0.00 copay in 004. Payment info on file confirmed. No new meds, allergies, or health conditions. No missed doses. Last dose administered on 04/01/2019. Next dose due on 04/15/2019. Medication will ship 4/4 for  4/5. Delivery address on file verified. Patient voiced understanding & has no questions or concerns at this time. The patient declined [facility] ... The patient requested a sharps container. - CAZ

## 2019-04-26 ENCOUNTER — TELEPHONE (OUTPATIENT)
Dept: PHARMACY | Facility: CLINIC | Age: 39
End: 2019-04-26

## 2019-05-03 DIAGNOSIS — M06.00 SERONEGATIVE RHEUMATOID ARTHRITIS: ICD-10-CM

## 2019-06-10 ENCOUNTER — PATIENT MESSAGE (OUTPATIENT)
Dept: PHARMACY | Facility: CLINIC | Age: 39
End: 2019-06-10

## 2019-06-10 ENCOUNTER — TELEPHONE (OUTPATIENT)
Dept: PHARMACY | Facility: CLINIC | Age: 39
End: 2019-06-10

## 2019-06-27 NOTE — TELEPHONE ENCOUNTER
Patient reached for refill and follow up Humira. She says she was out of town and lost service, was away for longer than expected. Requesting refill, last dose was DUE 6/17. She will resume tomorrow with every other Friday dosing upon receiving shipment from [facility]. Reviewed importance for adherence. Shipment set for 6/27 for patient to receive 6/28. $0 copay (004). Address verified. No changes to other medications or conditions. No other questions or concerns today.

## 2019-07-16 ENCOUNTER — LAB VISIT (OUTPATIENT)
Dept: LAB | Facility: HOSPITAL | Age: 39
End: 2019-07-16
Attending: INTERNAL MEDICINE
Payer: MEDICAID

## 2019-07-16 DIAGNOSIS — M06.9 RHEUMATOID ARTHRITIS INVOLVING MULTIPLE SITES, UNSPECIFIED RHEUMATOID FACTOR PRESENCE: ICD-10-CM

## 2019-07-16 LAB
ALBUMIN SERPL BCP-MCNC: 4.3 G/DL (ref 3.5–5.2)
ALP SERPL-CCNC: 68 U/L (ref 55–135)
ALT SERPL W/O P-5'-P-CCNC: 14 U/L (ref 10–44)
ANION GAP SERPL CALC-SCNC: 9 MMOL/L (ref 8–16)
AST SERPL-CCNC: 17 U/L (ref 10–40)
BASOPHILS # BLD AUTO: 0.06 K/UL (ref 0–0.2)
BASOPHILS NFR BLD: 0.6 % (ref 0–1.9)
BILIRUB SERPL-MCNC: 0.3 MG/DL (ref 0.1–1)
BUN SERPL-MCNC: 10 MG/DL (ref 6–20)
CALCIUM SERPL-MCNC: 9.7 MG/DL (ref 8.7–10.5)
CHLORIDE SERPL-SCNC: 103 MMOL/L (ref 95–110)
CO2 SERPL-SCNC: 27 MMOL/L (ref 23–29)
CREAT SERPL-MCNC: 0.9 MG/DL (ref 0.5–1.4)
CRP SERPL-MCNC: 1 MG/L (ref 0–8.2)
DIFFERENTIAL METHOD: ABNORMAL
EOSINOPHIL # BLD AUTO: 0 K/UL (ref 0–0.5)
EOSINOPHIL NFR BLD: 0.4 % (ref 0–8)
ERYTHROCYTE [DISTWIDTH] IN BLOOD BY AUTOMATED COUNT: 11.9 % (ref 11.5–14.5)
ERYTHROCYTE [SEDIMENTATION RATE] IN BLOOD BY WESTERGREN METHOD: 5 MM/HR (ref 0–20)
EST. GFR  (AFRICAN AMERICAN): >60 ML/MIN/1.73 M^2
EST. GFR  (NON AFRICAN AMERICAN): >60 ML/MIN/1.73 M^2
GLUCOSE SERPL-MCNC: 66 MG/DL (ref 70–110)
HCT VFR BLD AUTO: 42.2 % (ref 37–48.5)
HGB BLD-MCNC: 14 G/DL (ref 12–16)
IMM GRANULOCYTES # BLD AUTO: 0.03 K/UL (ref 0–0.04)
IMM GRANULOCYTES NFR BLD AUTO: 0.3 % (ref 0–0.5)
LYMPHOCYTES # BLD AUTO: 1.8 K/UL (ref 1–4.8)
LYMPHOCYTES NFR BLD: 17.5 % (ref 18–48)
MCH RBC QN AUTO: 31.5 PG (ref 27–31)
MCHC RBC AUTO-ENTMCNC: 33.2 G/DL (ref 32–36)
MCV RBC AUTO: 95 FL (ref 82–98)
MONOCYTES # BLD AUTO: 0.4 K/UL (ref 0.3–1)
MONOCYTES NFR BLD: 4 % (ref 4–15)
NEUTROPHILS # BLD AUTO: 8.2 K/UL (ref 1.8–7.7)
NEUTROPHILS NFR BLD: 77.2 % (ref 38–73)
NRBC BLD-RTO: 0 /100 WBC
PLATELET # BLD AUTO: 229 K/UL (ref 150–350)
PMV BLD AUTO: 12.6 FL (ref 9.2–12.9)
POTASSIUM SERPL-SCNC: 3.4 MMOL/L (ref 3.5–5.1)
PROT SERPL-MCNC: 7.3 G/DL (ref 6–8.4)
RBC # BLD AUTO: 4.44 M/UL (ref 4–5.4)
SODIUM SERPL-SCNC: 139 MMOL/L (ref 136–145)
WBC # BLD AUTO: 10.54 K/UL (ref 3.9–12.7)

## 2019-07-16 PROCEDURE — 85651 RBC SED RATE NONAUTOMATED: CPT | Mod: PO

## 2019-07-16 PROCEDURE — 80053 COMPREHEN METABOLIC PANEL: CPT

## 2019-07-16 PROCEDURE — 85025 COMPLETE CBC W/AUTO DIFF WBC: CPT

## 2019-07-16 PROCEDURE — 86140 C-REACTIVE PROTEIN: CPT

## 2019-07-16 PROCEDURE — 36415 COLL VENOUS BLD VENIPUNCTURE: CPT | Mod: PO

## 2019-07-17 ENCOUNTER — OFFICE VISIT (OUTPATIENT)
Dept: RHEUMATOLOGY | Facility: CLINIC | Age: 39
End: 2019-07-17
Payer: MEDICAID

## 2019-07-17 VITALS
BODY MASS INDEX: 21.1 KG/M2 | HEIGHT: 62 IN | WEIGHT: 114.63 LBS | SYSTOLIC BLOOD PRESSURE: 103 MMHG | DIASTOLIC BLOOD PRESSURE: 69 MMHG | HEART RATE: 93 BPM

## 2019-07-17 DIAGNOSIS — M06.00 SERONEGATIVE RHEUMATOID ARTHRITIS: Primary | ICD-10-CM

## 2019-07-17 DIAGNOSIS — F31.81 BIPOLAR 2 DISORDER: ICD-10-CM

## 2019-07-17 DIAGNOSIS — G93.32 CHRONIC FATIGUE SYNDROME: ICD-10-CM

## 2019-07-17 DIAGNOSIS — M79.7 FIBROMYALGIA: ICD-10-CM

## 2019-07-17 PROCEDURE — 99214 PR OFFICE/OUTPT VISIT, EST, LEVL IV, 30-39 MIN: ICD-10-PCS | Mod: S$PBB,,, | Performed by: INTERNAL MEDICINE

## 2019-07-17 PROCEDURE — 99999 PR PBB SHADOW E&M-EST. PATIENT-LVL III: ICD-10-PCS | Mod: PBBFAC,,, | Performed by: INTERNAL MEDICINE

## 2019-07-17 PROCEDURE — 99213 OFFICE O/P EST LOW 20 MIN: CPT | Mod: PBBFAC,PO | Performed by: INTERNAL MEDICINE

## 2019-07-17 PROCEDURE — 99214 OFFICE O/P EST MOD 30 MIN: CPT | Mod: S$PBB,,, | Performed by: INTERNAL MEDICINE

## 2019-07-17 PROCEDURE — 99999 PR PBB SHADOW E&M-EST. PATIENT-LVL III: CPT | Mod: PBBFAC,,, | Performed by: INTERNAL MEDICINE

## 2019-07-17 RX ORDER — TIZANIDINE HYDROCHLORIDE 4 MG/1
4 CAPSULE, GELATIN COATED ORAL 2 TIMES DAILY PRN
Qty: 60 CAPSULE | Refills: 6 | Status: SHIPPED | OUTPATIENT
Start: 2019-07-17 | End: 2020-06-17 | Stop reason: SDUPTHER

## 2019-07-17 RX ORDER — SERTRALINE HYDROCHLORIDE 25 MG/1
25 TABLET, FILM COATED ORAL DAILY
COMMUNITY
Start: 2019-05-02 | End: 2021-02-25

## 2019-07-17 RX ORDER — SERTRALINE HYDROCHLORIDE 50 MG/1
75 TABLET, FILM COATED ORAL DAILY
COMMUNITY
Start: 2019-07-01 | End: 2021-02-25

## 2019-07-17 ASSESSMENT — ROUTINE ASSESSMENT OF PATIENT INDEX DATA (RAPID3)
PSYCHOLOGICAL DISTRESS SCORE: 4.4
TOTAL RAPID3 SCORE: 4.11
MDHAQ FUNCTION SCORE: .7
PAIN SCORE: 6
PATIENT GLOBAL ASSESSMENT SCORE: 4

## 2019-07-17 NOTE — PROGRESS NOTES
Subjective:          Chief Complaint: Dorothy Jesus is a 39 y.o. female who had concerns including Rheumatoid Arthritis.    HPI:    With a positive LENA trial of prednisone did show significant improvement in her overall symptoms consistent or at least suggestive of an undifferentiated connective tissue disease slight elevation in her CRP normal sedimentation rate but did have benefit on prednisone.  When she comes off prednisone everything seems to regress at her baseline. No rashes, some pain with respirations, no fevers but subjectively, fatigue in sun no rash.     Seems to be doing very well with Humira.    Prednisone still 5mg daily PRN.   She failed HCQ with no response  MTX made her ill/nausea.  SSZ 2gm daily and tolerating not having much appreciable benefit.     C/o shoulders and ankles with chronic aches and pains.   Pain in the ankle worse at the end of the stand.       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 No cardiac manifestations.   She keeps good journal on symptoms: seems to be worse in the evenings. Associated with poor sleep for the morning.       Not typically using every day, but as of late. On a good day 30% help, on average 10%,Ketoprofen not helping as much for aches as once did.  Savella was recently written by   Toradol PRN does help for bad days  She notes with  on full time employment stress much reduced which makes symptoms much more tolerable.       FMS:     She has increased her activity level trying to eat healthier.  In the past she states she was getting some relief of myalgias and arthralgias on ketoprofen.  He completed the vitamin D 12 weeks her psychiatrist has recently adjusted her medications slightly and this seems to be helping a great deal.  Interestingly she noted with the adjustment in her ADHD meds her myalgias have improved.Patient is a 36 y/o female referred for +LENA  1:80 homogeneous with repeat serologies + 1:320 negative LENA profile.  with  "myalgias, brain fog. Present for few years worse in past 2 years, affecting daily living/function. Patient did suffer from worsening of her symptoms after trying gabapentin, she did increase dose as we discussed but this only exacerbated her complaints. She did have worseing depression through the winter. Recent job did not work out for her, which was frustrating.  did recently get full time job.    Exacerbates with menstrual cycles. Patient has a non-restorative sleep pattern, no snoring. She has had sleep study[place] negative for narcoplepsy. Patient was given Provigil exacerbated anxiety.   Ibuprofen 600mg no help. APAP no help, naproxen no help.   Recently with cognitive impairment, ++migraines (unable to see Dr. Yao at this time, cannot drive to [facility]), extreme fatigue, dizzyness. She is more forgetful that typical.   Trying cataflam PRN migraine.      Patient was on lamictal for nearly 8 years d/c'd for suspected myalgias. Recently (few weeks ago) trialed lithium did not tolerate. Zyprexa, Geodon, topamax and risperdol. Wellbutrin has been the best medication for her as she continues to feel "human". She is having more myalgias, and some arthralgias some good days some bad days. She cannot see a pattern of morning stiffness.     Component      Latest Ref Rng & Units 4/13/2017 12/10/2015   Anti Sm Antibody      0.00 - 19.99 EU 0.35 0.43   Anti-Sm Interpretation      Negative Negative Negative   Anti-SSA Antibody      0.00 - 19.99 EU 0.97 1.62   Anti-SSA Interpretation      Negative Negative Negative   Anti-SSB Antibody      0.00 - 19.99 EU 0.35 0.00   Anti-SSB Interpretation      Negative Negative Negative   ds DNA Ab      Negative 1:10 Negative 1:10 Negative 1:10   Anti Sm/RNP Antibody      0.00 - 19.99 EU 0.79 0.86   Anti-Sm/RNP Interpretation      Negative Negative Negative   Complement (C-3)      50 - 180 mg/dL  108   Complement (C-4)      11 - 44 mg/dL  27   CPK      20 - 180 U/L  50   LENA " Screen      Negative <1:160 Positive (A)    LENA HEP-2 Titer       Positive 1:320 Homogeneous        REVIEW OF SYSTEMS:    Review of Systems   Constitutional: Positive for malaise/fatigue. Negative for fever and weight loss.   HENT: Negative for sore throat.    Eyes: Negative for double vision, photophobia and redness.   Respiratory: Negative for cough, shortness of breath and wheezing.    Cardiovascular: Negative for chest pain, palpitations and orthopnea.   Gastrointestinal: Negative for abdominal pain, constipation and diarrhea.   Genitourinary: Negative for dysuria, hematuria and urgency.   Musculoskeletal: Positive for joint pain and neck pain. Negative for back pain and myalgias.   Skin: Negative for rash.   Neurological: Negative for dizziness, tingling, focal weakness and headaches.   Endo/Heme/Allergies: Does not bruise/bleed easily.   Psychiatric/Behavioral: Negative for depression, hallucinations and suicidal ideas.               Objective:            Past Medical History:   Diagnosis Date    Anxiety     Bartholin's cyst     Bipolar 2 disorder     Chronic fatigue     Osteopenia      History reviewed. No pertinent family history.  Social History     Tobacco Use    Smoking status: Never Smoker    Smokeless tobacco: Never Used   Substance Use Topics    Alcohol use: No     Comment: rare    Drug use: No         Current Outpatient Medications on File Prior to Visit   Medication Sig Dispense Refill    adalimumab (HUMIRA) PnKt injection Inject 0.8 mLs (40 mg total) into the skin every 14 (fourteen) days. 2 pen 3    buPROPion (WELLBUTRIN XL) 300 MG 24 hr tablet Take 150 mg by mouth once daily.       clonazePAM (KLONOPIN) 1 MG tablet 0.5 mg.       dextroamphetamine-amphetamine (ADDERALL XR) 20 MG 24 hr capsule Take 20 mg by mouth once daily. At 1pm      dextroamphetamine-amphetamine (ADDERALL XR) 30 MG 24 hr capsule Take 30 mg by mouth every morning.      diclofenac (CATAFLAM) 50 MG tablet Take 1  tablet (50 mg total) by mouth 2 (two) times daily as needed. 30 tablet 6    methocarbamol (ROBAXIN) 500 MG Tab TAKE 1 TABLET (500MG) BY MOUTH TWO TIMES A DAY AS NEEDED 60 tablet 11    methocarbamol (ROBAXIN) 500 MG Tab TAKE 1 TABLET (500MG) BY MOUTH TWO TIMES A DAY AS NEEDED 60 tablet 6    metoprolol succinate (TOPROL-XL) 25 MG 24 hr tablet TAKE ONE TABLET BY MOUTH ONCE DAILY 90 tablet 3    metoprolol succinate (TOPROL-XL) 25 MG 24 hr tablet TAKE ONE TABLET BY MOUTH ONCE DAILY 90 tablet 3    predniSONE (DELTASONE) 5 MG tablet TAKE ONE TABLET (5 MG TOTAL) BY MOUTH ONCE DAILY 90 tablet 3    sertraline (ZOLOFT) 25 MG tablet       sertraline (ZOLOFT) 50 MG tablet       [DISCONTINUED] alprazolam (XANAX) 0.5 MG tablet Take by mouth 2 (two) times daily as needed for Anxiety.       [DISCONTINUED] fluticasone (FLONASE) 50 mcg/actuation nasal spray 2 sprays by Each Nare route once daily. 1 Bottle 5    [DISCONTINUED] loratadine (CLARITIN) 10 mg tablet Take 10 mg by mouth once daily.       No current facility-administered medications on file prior to visit.        Vitals:    07/17/19 1043   BP: 103/69   Pulse: 93       Physical Exam:    Physical Exam   Constitutional: She appears well-developed and well-nourished.   HENT:   Nose: No septal deviation.   Mouth/Throat: Mucous membranes are normal. No oral lesions.   Eyes: Pupils are equal, round, and reactive to light. Right conjunctiva is not injected. Left conjunctiva is not injected.   Neck: No JVD present. No thyroid mass and no thyromegaly present.   Cardiovascular: Normal rate, regular rhythm and normal pulses.   No edema   Pulmonary/Chest: Effort normal and breath sounds normal.   Abdominal: Soft. Normal appearance. There is no hepatosplenomegaly.   Musculoskeletal:        Right shoulder: She exhibits normal range of motion, no tenderness and no swelling.        Left shoulder: She exhibits normal range of motion, no tenderness and no swelling.        Right elbow:  She exhibits normal range of motion and no swelling. No tenderness found.        Left elbow: She exhibits normal range of motion and no swelling. No tenderness found.        Right wrist: She exhibits normal range of motion, no tenderness and no swelling.        Left wrist: She exhibits normal range of motion, no tenderness and no swelling.        Right hip: She exhibits normal range of motion, normal strength and no swelling.        Left hip: She exhibits normal range of motion, no tenderness and no swelling.        Right knee: She exhibits normal range of motion and no swelling. No tenderness found.        Left knee: She exhibits normal range of motion and no swelling. No tenderness found.        Right ankle: She exhibits normal range of motion and no swelling. No tenderness.        Left ankle: She exhibits normal range of motion and no swelling. No tenderness.   No synovitis of the MCP, PIP, DIP or wrist    18/18 FMS tender points   Lymphadenopathy:     She has no cervical adenopathy.     She has no axillary adenopathy.   Neurological: She has normal strength and normal reflexes.   Skin: Skin is dry and intact.   Psychiatric: She has a normal mood and affect.             Assessment:       Encounter Diagnoses   Name Primary?    Seronegative rheumatoid arthritis Yes    Fibromyalgia     Chronic fatigue syndrome     Bipolar 2 disorder           Plan:        Seronegative rheumatoid arthritis    Fibromyalgia  -     tiZANidine 4 mg Cap; Take 4 mg by mouth 2 (two) times daily as needed.  Dispense: 60 capsule; Refill: 6    Chronic fatigue syndrome    Bipolar 2 disorder      Continue Prednisone 5mg now using PRN approx 3/7 days out of the week  No response with HCQ>   Intolerant MTX  SSZ tolerating but no significant difference when we held it for few weeks.    She is doing very well with Humira     cotinue working with Dr. Roblero on Adderal   Best she has looked in months.     Trial with Robaxin want to see how this  works relative to Tizanidine.     Follow up in about 5 months (around 12/17/2019).            30min consultation with greater than 50% spent in counseling, chart review and coordination of care. All questions answered.

## 2019-07-19 ENCOUNTER — TELEPHONE (OUTPATIENT)
Dept: PHARMACY | Facility: CLINIC | Age: 39
End: 2019-07-19

## 2019-08-05 NOTE — TELEPHONE ENCOUNTER
Pt confirmed shipping of Humira on  to arrive on . Address and  verified. $0 copay in 004. Pt is not in need of a new sharps container. Pt has 0 doses on hand, next dose due . Pt reported no missed doses. Pt did not start any new medications. Pt had no further questions or concerns.

## 2019-08-30 ENCOUNTER — TELEPHONE (OUTPATIENT)
Dept: PHARMACY | Facility: CLINIC | Age: 39
End: 2019-08-30

## 2019-08-30 ENCOUNTER — TELEPHONE (OUTPATIENT)
Dept: FAMILY MEDICINE | Facility: CLINIC | Age: 39
End: 2019-08-30

## 2019-08-30 NOTE — TELEPHONE ENCOUNTER
----- Message from Fabian Guerrero sent at 8/30/2019  2:37 PM CDT -----  Contact: pt  Type:  Patient Requesting Referral    Who Called:  pt  Does the patient already have the specialty appointment scheduled?:  no  If yes, what is the date of that appointment?:    Referral to What Specialty:  dermatology  Reason for Referral:  Moles changing shape and color  Does the patient want the referral with a specific physician?:  no  Is the specialist an [facility] or [facility]  Patient Requesting a Call Back?:  yes  Best Call Back Number:  [phone]  Additional Information:

## 2019-08-30 NOTE — TELEPHONE ENCOUNTER
"Call placed to patient,     Psychiatrist wants a letter from the PCP saying that saying that patient is "cleared" to take adderral.   Requested that the patient have office reach out to us with a specific request of what information they are needed.     In addition, asked patient to reach out to insurance to determine what dermatologists are accepting new patients. Referral can be pended once she has provided this information.     "

## 2019-09-24 RX ORDER — PREDNISONE 5 MG/1
TABLET ORAL
Qty: 90 TABLET | Refills: 3 | Status: SHIPPED | OUTPATIENT
Start: 2019-09-24 | End: 2020-06-17 | Stop reason: SDUPTHER

## 2019-10-08 ENCOUNTER — TELEPHONE (OUTPATIENT)
Dept: PHARMACY | Facility: CLINIC | Age: 39
End: 2019-10-08

## 2019-10-08 ENCOUNTER — PATIENT MESSAGE (OUTPATIENT)
Dept: PHARMACY | Facility: CLINIC | Age: 39
End: 2019-10-08

## 2019-10-08 DIAGNOSIS — M06.00 SERONEGATIVE RHEUMATOID ARTHRITIS: ICD-10-CM

## 2019-10-10 NOTE — PROGRESS NOTES
"Room search complete.  Approximately 2\" bolt found above light fixture in room.  Presumed that bolt was present before client admitted to program on 10/9/2019.  " Subjective:       Patient ID: Dorothy Jesus is a 37 y.o. female.    Chief Complaint: Tachycardia (Follow up from 5/5/17 office visit. ( Did not  meds called in from that visit)) and Otalgia (Follow up, patient states still unable to hear from left hear. )    HPI Comments: Here for f/u tachycardia.  Could have been related to savella per neuropsych.  Had some low bp with beta blocker though.    Otalgia    Pertinent negatives include no abdominal pain, coughing or rash.     Review of Systems   Constitutional: Negative for chills, fatigue and fever.   HENT: Positive for ear pain.    Respiratory: Negative for cough, chest tightness and shortness of breath.    Cardiovascular: Negative for chest pain, palpitations and leg swelling.   Gastrointestinal: Negative for abdominal distention and abdominal pain.   Endocrine: Negative for cold intolerance and heat intolerance.   Skin: Negative for rash.       Objective:      Physical Exam   Constitutional: She appears well-developed and well-nourished.   HENT:   Head: Normocephalic and atraumatic.   Right Ear: Tympanic membrane normal.   Left Ear: A middle ear effusion is present.   Nose: Mucosal edema present.   Cardiovascular: Normal rate, regular rhythm and normal heart sounds.    Nursing note and vitals reviewed.      Assessment:       1. ETD (eustachian tube dysfunction), left    2. Tachycardia        Plan:       ETD (eustachian tube dysfunction), left  -     fluticasone (FLONASE) 50 mcg/actuation nasal spray; 2 sprays by Each Nare route once daily.  Dispense: 1 Bottle; Refill: 5    Tachycardia  -     metoprolol succinate (TOPROL-XL) 25 MG 24 hr tablet; Take 1 tablet (25 mg total) by mouth once daily.  Dispense: 30 tablet; Refill: 5    To cardiology if persists but will try med change and see if we can get benefit with out bp changes.  Could be adderall related so f/u with neuropsych.  Return in about 3 months (around 8/8/2017), or if symptoms worsen or fail  to improve.

## 2019-10-15 DIAGNOSIS — M06.00 SERONEGATIVE RHEUMATOID ARTHRITIS: ICD-10-CM

## 2019-10-15 NOTE — TELEPHONE ENCOUNTER
Pt confirmed shipping of Humira on 10/17 to arrive on 10/18. Address and  verified. $0 copay in 004. Pt is not in need of a new sharps container. Pt has 0 doses on hand, next dose due 10/23. Pt held 1 dose due to feeling ill. Pt did not start any new medications. Pt had no further questions or concerns.

## 2019-11-13 ENCOUNTER — TELEPHONE (OUTPATIENT)
Dept: PHARMACY | Facility: CLINIC | Age: 39
End: 2019-11-13

## 2019-12-12 ENCOUNTER — TELEPHONE (OUTPATIENT)
Dept: PHARMACY | Facility: CLINIC | Age: 39
End: 2019-12-12

## 2019-12-23 ENCOUNTER — TELEPHONE (OUTPATIENT)
Dept: PHARMACY | Facility: CLINIC | Age: 39
End: 2019-12-23

## 2020-01-02 ENCOUNTER — OFFICE VISIT (OUTPATIENT)
Dept: URGENT CARE | Facility: CLINIC | Age: 40
End: 2020-01-02
Payer: MEDICAID

## 2020-01-02 VITALS
HEART RATE: 92 BPM | HEIGHT: 62 IN | DIASTOLIC BLOOD PRESSURE: 60 MMHG | SYSTOLIC BLOOD PRESSURE: 102 MMHG | OXYGEN SATURATION: 95 % | RESPIRATION RATE: 18 BRPM | BODY MASS INDEX: 20.98 KG/M2 | WEIGHT: 114 LBS | TEMPERATURE: 99 F

## 2020-01-02 DIAGNOSIS — J32.9 CLINICAL SINUSITIS: Primary | ICD-10-CM

## 2020-01-02 DIAGNOSIS — H66.92 LEFT ACUTE OTITIS MEDIA: ICD-10-CM

## 2020-01-02 PROCEDURE — 99214 PR OFFICE/OUTPT VISIT, EST, LEVL IV, 30-39 MIN: ICD-10-PCS | Mod: S$GLB,,, | Performed by: NURSE PRACTITIONER

## 2020-01-02 PROCEDURE — 99214 OFFICE O/P EST MOD 30 MIN: CPT | Mod: S$GLB,,, | Performed by: NURSE PRACTITIONER

## 2020-01-02 RX ORDER — LORATADINE 10 MG/1
10 TABLET ORAL DAILY
Qty: 30 TABLET | Refills: 0 | Status: SHIPPED | OUTPATIENT
Start: 2020-01-02 | End: 2021-02-25

## 2020-01-02 RX ORDER — BETAMETHASONE SODIUM PHOSPHATE AND BETAMETHASONE ACETATE 3; 3 MG/ML; MG/ML
6 INJECTION, SUSPENSION INTRA-ARTICULAR; INTRALESIONAL; INTRAMUSCULAR; SOFT TISSUE
Status: COMPLETED | OUTPATIENT
Start: 2020-01-02 | End: 2020-01-02

## 2020-01-02 RX ORDER — AZITHROMYCIN 250 MG/1
TABLET, FILM COATED ORAL
Qty: 6 TABLET | Refills: 0 | Status: SHIPPED | OUTPATIENT
Start: 2020-01-02 | End: 2020-01-07

## 2020-01-02 RX ADMIN — BETAMETHASONE SODIUM PHOSPHATE AND BETAMETHASONE ACETATE 6 MG: 3; 3 INJECTION, SUSPENSION INTRA-ARTICULAR; INTRALESIONAL; INTRAMUSCULAR; SOFT TISSUE at 12:01

## 2020-01-02 NOTE — PATIENT INSTRUCTIONS
Follow up with your doctor in a few days.  Return to the urgent care or go to the ER if symptoms get worse.    You received a steroid shot today - this can elevate your blood pressure, elevate your blood sugar, water weight gain, nervous energy, redness to the face and dimpling of the skin where the shot goes in.       Start taking your antibiotics and take for the full duration.-zpak     claritin daily for the next week.  Salt Water Nasal Spray OTC 3x/day for the next 7 days        Follow up with Primary Care or ENT if not improved in 7-10 Days        Sinusitis (Antibiotic Treatment)    The sinuses are air-filled spaces within the bones of the face. They connect to the inside of the nose. Sinusitis is an inflammation of the tissue lining the sinus cavity. Sinus inflammation can occur during a cold. It can also be due to allergies to pollens and other particles in the air. Sinusitis can cause symptoms of sinus congestion and fullness. A sinus infection causes fever, headache and facial pain. There is often green or yellow drainage from the nose or into the back of the throat (post-nasal drip). You have been given antibiotics to treat this condition.    Please return here or go to the Emergency Department for any concerns or worsening of condition.    Home care:  · Take the full course of antibiotics as instructed. Do not stop taking them, even if you feel better.  · Get rest!  · Drink plenty of water, hot tea, and other liquids. This may help thin mucus. It also may promote sinus drainage.  · Heat may help soothe painful areas of the face. Use a towel soaked in hot water. Or,  the shower and direct the hot spray onto your face. Using a vaporizer along with a menthol rub at night may also help.   · An expectorant containing guaifenesin may help thin the mucus and promote drainage from the sinuses.  · If you are a female and on birth control pills and take the antibiotics, use additional methods to prevent  "pregnancy while on the antibiotics and for one cycle after.  · Flonase (fluticasone) is an over the counter nasal spray which may help with your symptoms.  · Zyrtec D, Claritin D, or Allegra D can also help with symptoms of congestion and drainage  · If you have hypertesion, avoid using the "D" which is a decongestant.  · If clear drainage, you can take plain zyrtec, claritin, allegra.  · If congested, you can take pseudoephedrine-you need to ask for this behind the pharmacy counter-do not take if you have high blood pressure. Pheylephrine is on the shelf and is not effective.  · Tylenol or ibuprofen can be used as directed for pain, unless you have allergies. Avoid ibuprofen if medical conditions such as stomach ulcers, kidney or liver disease, or blood thinners  · Afrin is effective if flying in the next few days. Take as directed for the airplane flight upon taking off and landing. Do not continue to use Afrin as it will cause rebound congestion.  · Don't smoke. This can worsen symptoms.  Follow-up care  Follow up with your healthcare provider or our staff if you are not improving within the next week.    When to seek medical advice  Call your healthcare provider if any of these occur:  · Facial pain or headache becoming more severe  · Stiff neck  · Unusual drowsiness or confusion  · Swelling of the forehead or eyelids  · Vision problems, including blurred or double vision  · Fever of 100.4ºF (38ºC) or higher, or as directed by your healthcare provider  · Seizure  · Breathing problems  · Symptoms not resolving within 10 days          "

## 2020-01-02 NOTE — PROGRESS NOTES
"Subjective:       Patient ID: Dorothy Jesus is a 39 y.o. female.    Vitals:  height is 5' 2" (1.575 m) and weight is 51.7 kg (114 lb). Her oral temperature is 99 °F (37.2 °C). Her blood pressure is 102/60 and her pulse is 92. Her respiration is 18 and oxygen saturation is 95%.     Chief Complaint: Nasal Congestion and Otalgia    Pt present today with Congestion and  Ear Pain (Lt). Symptoms started about a week ago with the congestion. PT has tried otc meds but no relief. Pt feels the pressure, not much pain in her ear. Smh: bipolar, autoimmune- fibro, RA, chronic fatigue.  Reports she has hx of chronic sinus and she knows when she needs abx/steroid. Requesting steroid injection.     Otalgia    There is pain in the left ear. This is a new problem. The current episode started 1 to 4 weeks ago. The problem has been gradually worsening. The pain is at a severity of 0/10. The patient is experiencing no pain. Associated symptoms include coughing and headaches. Pertinent negatives include no abdominal pain, diarrhea, ear discharge, hearing loss, neck pain, rash, rhinorrhea, sore throat or vomiting. She has tried acetaminophen for the symptoms. The treatment provided no relief. There is no history of a chronic ear infection, hearing loss or a tympanostomy tube.       HENT: Positive for ear pain. Negative for ear discharge, hearing loss and sore throat.    Neck: Negative for neck pain.   Respiratory: Positive for cough.    Gastrointestinal: Negative for abdominal pain, vomiting and diarrhea.   Skin: Negative for rash.   Neurological: Positive for headaches.       Objective:      Physical Exam   Constitutional: She is oriented to person, place, and time. She appears well-developed and well-nourished. She is cooperative.  Non-toxic appearance. She does not have a sickly appearance. She does not appear ill. No distress.   HENT:   Head: Normocephalic and atraumatic.   Right Ear: Hearing, tympanic membrane, external ear " and ear canal normal. No middle ear effusion.   Left Ear: Hearing, external ear and ear canal normal. Tympanic membrane is erythematous and bulging. A middle ear effusion is present.   Nose: Mucosal edema present. No rhinorrhea or nasal deformity. No epistaxis. Right sinus exhibits maxillary sinus tenderness and frontal sinus tenderness. Left sinus exhibits maxillary sinus tenderness and frontal sinus tenderness.   Mouth/Throat: Uvula is midline and mucous membranes are normal. No trismus in the jaw. Normal dentition. No uvula swelling. Posterior oropharyngeal erythema present. No oropharyngeal exudate or posterior oropharyngeal edema.   Eyes: Conjunctivae and lids are normal. No scleral icterus.   Neck: Trachea normal, full passive range of motion without pain and phonation normal. Neck supple. No neck rigidity. No edema and no erythema present.   Cardiovascular: Normal rate, regular rhythm, normal heart sounds, intact distal pulses and normal pulses.   Pulses:       Radial pulses are 2+ on the right side, and 2+ on the left side.   Pulmonary/Chest: Effort normal and breath sounds normal. No stridor. No respiratory distress. She has no decreased breath sounds. She has no wheezes. She has no rhonchi.   Abdominal: Normal appearance.   Musculoskeletal: Normal range of motion. She exhibits no edema or deformity.   Neurological: She is alert and oriented to person, place, and time. She exhibits normal muscle tone. Coordination normal.   Skin: Skin is warm, dry, intact, not diaphoretic and not pale.   Psychiatric: She has a normal mood and affect. Her speech is normal and behavior is normal. Judgment and thought content normal. Cognition and memory are normal.   Nursing note and vitals reviewed.        Assessment:       1. Clinical sinusitis    2. Left acute otitis media        Plan:         Clinical sinusitis  -     betamethasone acetate-betamethasone sodium phosphate injection 6 mg  -     azithromycin (Z-MONIE) 250 MG  tablet; Take 2 tablets by mouth on day 1; Take 1 tablet by mouth on days 2-5  Dispense: 6 tablet; Refill: 0  -     loratadine (CLARITIN) 10 mg tablet; Take 1 tablet (10 mg total) by mouth once daily.  Dispense: 30 tablet; Refill: 0    Left acute otitis media  -     azithromycin (Z-MONIE) 250 MG tablet; Take 2 tablets by mouth on day 1; Take 1 tablet by mouth on days 2-5  Dispense: 6 tablet; Refill: 0      Patient Instructions   Follow up with your doctor in a few days.  Return to the urgent care or go to the ER if symptoms get worse.    You received a steroid shot today - this can elevate your blood pressure, elevate your blood sugar, water weight gain, nervous energy, redness to the face and dimpling of the skin where the shot goes in.       Start taking your antibiotics and take for the full duration.-zpak     claritin daily for the next week.  Salt Water Nasal Spray OTC 3x/day for the next 7 days        Follow up with Primary Care or ENT if not improved in 7-10 Days        Sinusitis (Antibiotic Treatment)    The sinuses are air-filled spaces within the bones of the face. They connect to the inside of the nose. Sinusitis is an inflammation of the tissue lining the sinus cavity. Sinus inflammation can occur during a cold. It can also be due to allergies to pollens and other particles in the air. Sinusitis can cause symptoms of sinus congestion and fullness. A sinus infection causes fever, headache and facial pain. There is often green or yellow drainage from the nose or into the back of the throat (post-nasal drip). You have been given antibiotics to treat this condition.    Please return here or go to the Emergency Department for any concerns or worsening of condition.    Home care:  · Take the full course of antibiotics as instructed. Do not stop taking them, even if you feel better.  · Get rest!  · Drink plenty of water, hot tea, and other liquids. This may help thin mucus. It also may promote sinus  "drainage.  · Heat may help soothe painful areas of the face. Use a towel soaked in hot water. Or,  the shower and direct the hot spray onto your face. Using a vaporizer along with a menthol rub at night may also help.   · An expectorant containing guaifenesin may help thin the mucus and promote drainage from the sinuses.  · If you are a female and on birth control pills and take the antibiotics, use additional methods to prevent pregnancy while on the antibiotics and for one cycle after.  · Flonase (fluticasone) is an over the counter nasal spray which may help with your symptoms.  · Zyrtec D, Claritin D, or Allegra D can also help with symptoms of congestion and drainage  · If you have hypertesion, avoid using the "D" which is a decongestant.  · If clear drainage, you can take plain zyrtec, claritin, allegra.  · If congested, you can take pseudoephedrine-you need to ask for this behind the pharmacy counter-do not take if you have high blood pressure. Pheylephrine is on the shelf and is not effective.  · Tylenol or ibuprofen can be used as directed for pain, unless you have allergies. Avoid ibuprofen if medical conditions such as stomach ulcers, kidney or liver disease, or blood thinners  · Afrin is effective if flying in the next few days. Take as directed for the airplane flight upon taking off and landing. Do not continue to use Afrin as it will cause rebound congestion.  · Don't smoke. This can worsen symptoms.  Follow-up care  Follow up with your healthcare provider or our staff if you are not improving within the next week.    When to seek medical advice  Call your healthcare provider if any of these occur:  · Facial pain or headache becoming more severe  · Stiff neck  · Unusual drowsiness or confusion  · Swelling of the forehead or eyelids  · Vision problems, including blurred or double vision  · Fever of 100.4ºF (38ºC) or higher, or as directed by your healthcare provider  · Seizure  · Breathing " problems  · Symptoms not resolving within 10 days

## 2020-01-06 ENCOUNTER — TELEPHONE (OUTPATIENT)
Dept: RHEUMATOLOGY | Facility: CLINIC | Age: 40
End: 2020-01-06

## 2020-01-06 NOTE — TELEPHONE ENCOUNTER
----- Message from Tania Obrien, PharmMICHELLE sent at 1/2/2020 11:53 AM CST -----  Regarding: Percyira  Dr St and Staff,    [facility] has been attempting to reach Ms. Jesus regarding prescription for Humira. After numerous unsuccessful attempts, we are sending a postcard to the address on file. At this point in time, no further contact will be made from [facility] until patient responds to our mail correspondence.    If there is anything else we can do to further assist, please let us know.     Thanks,  Tania Obrien  [facility]  P: [phone]

## 2020-03-25 RX ORDER — METOPROLOL SUCCINATE 25 MG/1
TABLET, EXTENDED RELEASE ORAL
Qty: 90 TABLET | Refills: 0 | Status: SHIPPED | OUTPATIENT
Start: 2020-03-25 | End: 2021-02-22 | Stop reason: SDUPTHER

## 2020-03-25 NOTE — PROGRESS NOTES
Refill Authorization Note     is requesting a refill authorization.    Brief assessment and rationale for refill: REVIEW: unclear if patient still follows PCP          Medication Therapy Plan: lov(4/18) with PCP; needs appt(Annual); unclear if patient still follows with PCP                              Comments:   Refill Center Care Gap Closure protocols temporarily suspended.   Requested Prescriptions   Pending Prescriptions Disp Refills    metoprolol succinate (TOPROL-XL) 25 MG 24 hr tablet [Pharmacy Med Name: METOPROLOL SUCCINATE ER 25MG TB24] 90 tablet 0     Sig: TAKE 1 TABLET BY MOUTH ONCE DAILY       Cardiovascular:  Beta Blockers Failed - 3/24/2020 10:40 PM        Failed - Office visit in past 12 months or future 90 days.     Recent Outpatient Visits            2 months ago Clinical sinusitis    [facility] Ema Doyle NP    8 months ago Seronegative rheumatoid arthritis    [facility] [address] DO    1 year ago Rheumatoid arthritis involving multiple sites, unspecified rheumatoid factor presence    [facility] [address] DO    1 year ago Seronegative rheumatoid arthritis    [facility] [address] DO    1 year ago Migraine without status migrainosus, not intractable, unspecified migraine type    [facility] HARMEET Nguyen MD          Future Appointments              In 2 months [address] DO [facility]                Passed - Patient is at least 18 years old        Passed - Negative Pregnancy Status Check        Passed - Last BP in normal range within 360 days.     BP Readings from Last 3 Encounters:   01/02/20 102/60   07/17/19 103/69   10/10/18 130/70              Passed - Last Heart Rate in normal range within 360 days.     Pulse Readings from Last 3 Encounters:   01/02/20 92   07/17/19 93   10/10/18 64              Appointments  past 12m or future 3m  with PCP    Date Provider   Last Visit   4/23/2018 HARMEET Nguyen MD   Next Visit   Visit date not found HARMEET Nguyen MD   .  ED visits in past 90 days: 0       Note composed:12:12 AM 03/25/2020

## 2020-03-25 NOTE — TELEPHONE ENCOUNTER
Please see the following assessment. This refill request still requires some action on your part.    Pt requesting metoprolol refill. LOV with PCP >15 months ago (04/2018) - outside Refill Center protocol to renew. Have pended a 90-day supply for your review. Defer refill request to you.     Thank you!

## 2020-06-16 ENCOUNTER — TELEPHONE (OUTPATIENT)
Dept: RHEUMATOLOGY | Facility: CLINIC | Age: 40
End: 2020-06-16

## 2020-06-16 NOTE — TELEPHONE ENCOUNTER
----- Message from Radha Horn sent at 6/16/2020  3:27 PM CDT -----  Regarding: virture appt  Type:  Patient Returning Call    Who Called:  pt  Who Left Message for Patient:  anel   Does the patient know what this is regarding?:  give pt details about virture visit  Best Call Back Number:  [phone] (home)     Additional Information:  na      Patient confirms her virtual appointment. ANIBAL

## 2020-06-17 ENCOUNTER — OFFICE VISIT (OUTPATIENT)
Dept: RHEUMATOLOGY | Facility: CLINIC | Age: 40
End: 2020-06-17
Payer: MEDICAID

## 2020-06-17 ENCOUNTER — TELEPHONE (OUTPATIENT)
Dept: PHARMACY | Facility: CLINIC | Age: 40
End: 2020-06-17

## 2020-06-17 VITALS — BODY MASS INDEX: 21.95 KG/M2 | WEIGHT: 120 LBS

## 2020-06-17 DIAGNOSIS — M79.7 FIBROMYALGIA: ICD-10-CM

## 2020-06-17 DIAGNOSIS — M06.00 SERONEGATIVE RHEUMATOID ARTHRITIS: Primary | ICD-10-CM

## 2020-06-17 DIAGNOSIS — D84.9 IMMUNOSUPPRESSION: ICD-10-CM

## 2020-06-17 PROCEDURE — 99214 OFFICE O/P EST MOD 30 MIN: CPT | Mod: 95,,, | Performed by: INTERNAL MEDICINE

## 2020-06-17 PROCEDURE — 99214 PR OFFICE/OUTPT VISIT, EST, LEVL IV, 30-39 MIN: ICD-10-PCS | Mod: 95,,, | Performed by: INTERNAL MEDICINE

## 2020-06-17 RX ORDER — PREDNISONE 5 MG/1
TABLET ORAL
Qty: 90 TABLET | Refills: 3 | Status: SHIPPED | OUTPATIENT
Start: 2020-06-17 | End: 2021-07-26

## 2020-06-17 RX ORDER — TIZANIDINE HYDROCHLORIDE 4 MG/1
4 CAPSULE, GELATIN COATED ORAL 2 TIMES DAILY PRN
Qty: 60 CAPSULE | Refills: 6 | Status: SHIPPED | OUTPATIENT
Start: 2020-06-17 | End: 2020-12-30

## 2020-06-17 RX ORDER — ADALIMUMAB 40MG/0.8ML
40 KIT SUBCUTANEOUS
Qty: 2 PEN | Refills: 6 | Status: SHIPPED | OUTPATIENT
Start: 2020-06-17 | End: 2021-01-27 | Stop reason: SDUPTHER

## 2020-06-17 NOTE — PROGRESS NOTES
Subjective:          Chief Complaint: Dorothy Jesus is a 40 y.o. female who had concerns including Disease Management.    HPI:    With a positive LENA trial of prednisone did show significant improvement in her overall symptoms consistent or at least suggestive of an undifferentiated connective tissue disease slight elevation in her CRP normal sedimentation rate but did have benefit on prednisone.  When she comes off prednisone everything seems to regress at her baseline. No rashes, some pain with respirations, no fevers but subjectively, fatigue in sun no rash.     Seemed  to be doing very well with Humira.  Off since approximately 12/2019 and notes stiffness worse in hands: dexterity, loss of strength.     Prednisone still 5mg daily PRN. Relying on this since off Humira.   She failed HCQ with no response  MTX made her ill/nausea.  SSZ 2gm daily and tolerating not having much appreciable benefit.     C/o shoulders and ankles with chronic aches and pains.   Pain in the ankle worse at the end of the stand.       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 No cardiac manifestations.   She keeps good journal on symptoms: seems to be worse in the evenings. Associated with poor sleep for the morning.       Not typically using every day, but as of late. On a good day 30% help, on average 10%,Ketoprofen not helping as much for aches as once did.  Savella was recently written by   Felix PRN does help for bad days  She notes with  on full time employment stress much reduced which makes symptoms much more tolerable.       FMS:     She has increased her activity level trying to eat healthier.  In the past she states she was getting some relief of myalgias and arthralgias on ketoprofen.  He completed the vitamin D 12 weeks her psychiatrist has recently adjusted her medications slightly and this seems to be helping a great deal.  Interestingly she noted with the adjustment in her ADHD meds her myalgias have  "improved.Patient is a 34 y/o female referred for +LENA  1:80 homogeneous with repeat serologies + 1:320 negative LENA profile.  with myalgias, brain fog. Present for few years worse in past 2 years, affecting daily living/function. Patient did suffer from worsening of her symptoms after trying gabapentin, she did increase dose as we discussed but this only exacerbated her complaints. She did have worseing depression through the winter. Recent job did not work out for her, which was frustrating.  did recently get full time job.    Exacerbates with menstrual cycles. Patient has a non-restorative sleep pattern, no snoring. She has had sleep study-[place] negative for narcoplepsy. Patient was given Provigil exacerbated anxiety.   Ibuprofen 600mg no help. APAP no help, naproxen no help.   Recently with cognitive impairment, ++migraines (unable to see Dr. Yao at this time, cannot drive to [employer]), extreme fatigue, dizzyness. She is more forgetful that typical.   Trying cataflam PRN migraine.      Patient was on lamictal for nearly 8 years d/c'd for suspected myalgias. Recently (few weeks ago) trialed lithium did not tolerate. Zyprexa, Geodon, topamax and risperdol. Wellbutrin has been the best medication for her as she continues to feel "human". She is having more myalgias, and some arthralgias some good days some bad days. She cannot see a pattern of morning stiffness.     Component      Latest Ref Rng & Units 4/13/2017 12/10/2015   Anti Sm Antibody      0.00 - 19.99 EU 0.35 0.43   Anti-Sm Interpretation      Negative Negative Negative   Anti-SSA Antibody      0.00 - 19.99 EU 0.97 1.62   Anti-SSA Interpretation      Negative Negative Negative   Anti-SSB Antibody      0.00 - 19.99 EU 0.35 0.00   Anti-SSB Interpretation      Negative Negative Negative   ds DNA Ab      Negative 1:10 Negative 1:10 Negative 1:10   Anti Sm/RNP Antibody      0.00 - 19.99 EU 0.79 0.86   Anti-Sm/RNP Interpretation      Negative Negative " Negative   Complement (C-3)      50 - 180 mg/dL  108   Complement (C-4)      11 - 44 mg/dL  27   CPK      20 - 180 U/L  50   LENA Screen      Negative <1:160 Positive (A)    LENA HEP-2 Titer       Positive 1:320 Homogeneous        REVIEW OF SYSTEMS:    Review of Systems   Constitutional: Positive for malaise/fatigue. Negative for fever and weight loss.   HENT: Negative for sore throat.    Eyes: Negative for double vision, photophobia and redness.   Respiratory: Negative for cough, shortness of breath and wheezing.    Cardiovascular: Negative for chest pain, palpitations and orthopnea.   Gastrointestinal: Negative for abdominal pain, constipation and diarrhea.   Genitourinary: Negative for dysuria, hematuria and urgency.   Musculoskeletal: Positive for joint pain and neck pain. Negative for back pain and myalgias.   Skin: Negative for rash.   Neurological: Negative for dizziness, tingling, focal weakness and headaches.   Endo/Heme/Allergies: Does not bruise/bleed easily.   Psychiatric/Behavioral: Negative for depression, hallucinations and suicidal ideas.               Objective:            Past Medical History:   Diagnosis Date    Anxiety     Bartholin's cyst     Bipolar 2 disorder     Chronic fatigue     Osteopenia      History reviewed. No pertinent family history.  Social History     Tobacco Use    Smoking status: Never Smoker    Smokeless tobacco: Never Used   Substance Use Topics    Alcohol use: No     Comment: rare    Drug use: No         Current Outpatient Medications on File Prior to Visit   Medication Sig Dispense Refill    buPROPion (WELLBUTRIN XL) 300 MG 24 hr tablet Take 300 mg by mouth once daily.       clonazePAM (KLONOPIN) 1 MG tablet Take 0.5 mg by mouth once daily.       dextroamphetamine-amphetamine (ADDERALL XR) 20 MG 24 hr capsule Take 20 mg by mouth once daily. At 1pm      metoprolol succinate (TOPROL-XL) 25 MG 24 hr tablet TAKE ONE TABLET BY MOUTH ONCE DAILY 90 tablet 3     predniSONE (DELTASONE) 5 MG tablet TAKE ONE TABLET (5 MG TOTAL) BY MOUTH ONCE DAILY 90 tablet 3    sertraline (ZOLOFT) 25 MG tablet       sertraline (ZOLOFT) 50 MG tablet       tiZANidine 4 mg Cap Take 4 mg by mouth 2 (two) times daily as needed. 60 capsule 6    adalimumab (HUMIRA) PnKt injection Inject 0.8 mLs (40 mg total) into the skin every 14 (fourteen) days. 2 pen 3    dextroamphetamine-amphetamine (ADDERALL XR) 30 MG 24 hr capsule Take 30 mg by mouth every morning.      loratadine (CLARITIN) 10 mg tablet Take 1 tablet (10 mg total) by mouth once daily. 30 tablet 0    metoprolol succinate (TOPROL-XL) 25 MG 24 hr tablet TAKE 1 TABLET BY MOUTH ONCE DAILY 90 tablet 0     No current facility-administered medications on file prior to visit.        There were no vitals filed for this visit.    Physical Exam:    Physical Exam  Constitutional:       Appearance: She is well-developed.   Eyes:      General: Lids are normal.   Neurological:      Mental Status: She is oriented to person, place, and time.   Psychiatric:         Behavior: Behavior normal.         Thought Content: Thought content normal.               Assessment:       Encounter Diagnoses   Name Primary?    Seronegative rheumatoid arthritis Yes    Immunosuppression           Plan:        Seronegative rheumatoid arthritis  -     Quantiferon Gold TB; Future; Expected date: 06/17/2020  -     CBC auto differential; Standing; Expected date: 06/17/2020  -     Comprehensive metabolic panel; Standing; Expected date: 06/17/2020  -     Sedimentation rate; Standing; Expected date: 06/17/2020  -     C-Reactive Protein; Standing; Expected date: 06/17/2020    Immunosuppression  -     Quantiferon Gold TB; Future; Expected date: 06/17/2020  -     CBC auto differential; Standing; Expected date: 06/17/2020  -     Comprehensive metabolic panel; Standing; Expected date: 06/17/2020  -     Sedimentation rate; Standing; Expected date: 06/17/2020  -     C-Reactive Protein;  Standing; Expected date: 06/17/2020      Continue Prednisone 5mg now using PRN approx 3/7 days out of the week  No response with HCQ>   Intolerant MTX  SSZ tolerating but no significant difference when we held it for few weeks.    O      Best she has looked in months.     Trial with Robaxin want to see how this works relative to Tizanidine.     No follow-ups on file.            30min consultation with greater than 50% spent in counseling, chart review and coordination of care. All questions answered.

## 2020-06-17 NOTE — TELEPHONE ENCOUNTER
LVM for callback to inform patient that [facility] received prescription for Humira  and benefits investigation is required.  [facility] will be back in touch once insurance determination is received.

## 2020-06-18 NOTE — TELEPHONE ENCOUNTER
Pt confirmed shipping of Humira on  to arrive on . Address and  verified. $0 copay in 004. Pt is in need of a new sharps container. Pt is restarting therapy. Pt declined consult, experienced to medication. Pt had no further questions or concerns.

## 2020-07-13 ENCOUNTER — TELEPHONE (OUTPATIENT)
Dept: PHARMACY | Facility: CLINIC | Age: 40
End: 2020-07-13

## 2020-07-16 DIAGNOSIS — R00.0 TACHYCARDIA: ICD-10-CM

## 2020-08-07 NOTE — TELEPHONE ENCOUNTER
No new care gaps identified.  Powered by [employer]. Reference number: [id number]. 8/07/2020 11:14:11 AM   SHELDON

## 2020-08-10 ENCOUNTER — TELEPHONE (OUTPATIENT)
Dept: PHARMACY | Facility: CLINIC | Age: 40
End: 2020-08-10

## 2020-08-10 NOTE — TELEPHONE ENCOUNTER
Refill Routing Note   Medication(s) are not appropriate for processing by [facility]:       - Patient has not been seen in over 15 months by PCP     Medication-related problems identified: Requires appointment  Medication Therapy Plan: LOV 4/23/2018. Unclear if patient still follows with PCP. needs appt(annual)   Will follow up with your staff to schedule appointment after your decision.    Medication reconciliation completed: No      Automatic Epic Generated Protocol Data:        Requested Prescriptions   Pending Prescriptions Disp Refills    metoprolol succinate (TOPROL-XL) 25 MG 24 hr tablet 90 tablet 0     Sig: Take 1 tablet (25 mg total) by mouth once daily.       Cardiovascular:  Beta Blockers Failed - 8/7/2020 11:13 AM        Failed - Office visit in past 12 months or future 90 days.     Recent Outpatient Visits            1 month ago Seronegative rheumatoid arthritis    [facility] [address] DO    7 months ago Clinical sinusitis    [facility] Ema Doyle NP    1 year ago Seronegative rheumatoid arthritis    [facility] [address] [facility] DO    1 year ago Rheumatoid arthritis involving multiple sites, unspecified rheumatoid factor presence    [facility] [address] [facility] DO    2 years ago Seronegative rheumatoid arthritis    [facility] [address] DO                    Passed - Patient is at least 18 years old        Passed - Negative Pregnancy Status Check        Passed - Last BP in normal range within 360 days.     BP Readings from Last 3 Encounters:   01/02/20 102/60   07/17/19 103/69   10/10/18 130/70              Passed - Last Heart Rate in normal range within 360 days.     Pulse Readings from Last 3 Encounters:   01/02/20 92   07/17/19 93   10/10/18 64                   Appointments  past 12m or future 3m with PCP    Date Provider   Last Visit   4/23/2018 HARMEET Nguyen MD   Next Visit    Visit date not found HARMEET Nguyen MD   ED visits in past 90 days: [unfilled]     Note composed:11:45 AM 08/10/2020

## 2020-08-12 RX ORDER — METOPROLOL SUCCINATE 25 MG/1
25 TABLET, EXTENDED RELEASE ORAL DAILY
Qty: 90 TABLET | Refills: 0 | Status: SHIPPED | OUTPATIENT
Start: 2020-08-12 | End: 2021-02-22

## 2020-08-12 NOTE — TELEPHONE ENCOUNTER
Provider Staff:     Please schedule patient for Annual    Please also check with your provider if any further labs need to be added and scheduled together.    Thanks!  [facility]     Appointments  past 12m or future 3m with PCP    Date Provider   Last Visit   4/23/2018 HARMEET Nguyen MD   Next Visit   Visit date not found HARMEET Nguyen MD     Note composed:9:22 AM 08/12/2020

## 2020-08-27 ENCOUNTER — TELEPHONE (OUTPATIENT)
Dept: FAMILY MEDICINE | Facility: CLINIC | Age: 40
End: 2020-08-27

## 2020-09-09 ENCOUNTER — TELEPHONE (OUTPATIENT)
Dept: PHARMACY | Facility: CLINIC | Age: 40
End: 2020-09-09

## 2020-09-09 NOTE — TELEPHONE ENCOUNTER
Refill call regarding Humira at [facility].  Will prepare for shipment with patient consent on 9/10 to arrive .  Copay 0.00.  Patient next injection due 9/15.  Sharps added.  Patient has not started any new medications including OTC drugs. Patient has not had any medication/ dose or instruction changes. No new allergies or side effects reported with this shipment. Medication is being taken as prescribed by physician and properly stored. Two patient identifiers:  and Address verified. Patient has no questions or concerns for [facility].

## 2020-10-05 ENCOUNTER — PATIENT MESSAGE (OUTPATIENT)
Dept: ADMINISTRATIVE | Facility: HOSPITAL | Age: 40
End: 2020-10-05

## 2020-10-06 ENCOUNTER — TELEPHONE (OUTPATIENT)
Dept: PHARMACY | Facility: CLINIC | Age: 40
End: 2020-10-06

## 2020-10-12 ENCOUNTER — TELEPHONE (OUTPATIENT)
Dept: PHARMACY | Facility: CLINIC | Age: 40
End: 2020-10-12

## 2020-11-09 ENCOUNTER — SPECIALTY PHARMACY (OUTPATIENT)
Dept: PHARMACY | Facility: CLINIC | Age: 40
End: 2020-11-09

## 2020-11-13 ENCOUNTER — SPECIALTY PHARMACY (OUTPATIENT)
Dept: PHARMACY | Facility: CLINIC | Age: 40
End: 2020-11-13

## 2020-11-13 NOTE — TELEPHONE ENCOUNTER
Specialty Pharmacy - Refill Coordination    Specialty Medication Orders Linked to Encounter      Most Recent Value   Medication #1  adalimumab (HUMIRA PEN) 40 mg/0.8 mL PnKt (Order#[id number], Rx#[id number])          Refill Questions - Documented Responses      Most Recent Value   Relationship to patient of person spoken to?  Self   HIPAA/medical authority confirmed?  Yes   Any changes in contact preferences or allowed representatives?  No   Has the patient had any insurance changes?  No   Has the patient had any changes to specialty medication, dose, or instructions?  No   Has the patient started taking any new medications, herbals, or supplements?  No   Has the patient been diagnosed with any new medical conditions?  No   Does the patient have any new allergies to medications or foods?  No   Does the patient have any concerns about side effects?  No   Can the patient store medication/sharps container properly (at the correct temperature, away from children/pets, etc.)?  Yes   Can the patient call emergency services (911) in the event of an emergency?  Yes   Does the patient have any concerns or questions about taking or administering this medication as prescribed?  No   How many doses did the patient miss in the past 4 weeks or since the last fill?  0   How many doses does the patient have on hand?  0   How many days does the patient report on hand quantity will last?  0   Does the number of doses/days supply remaining match pharmacy expected amounts?  Yes   Does the patient feel that this medication is effective?  Yes   During the past 4 weeks, has patient missed any activities due to condition or medication?  No   During the past 4 weeks, did patient have any of the following urgent care visits?  None   How will the patient receive the medication?  Mail   When does the patient need to receive the medication?  11/17/20   Shipping Address  Home   Address in [facility] confirmed and updated if neccessary?   Yes   Expected Copay ($)  0   Is the patient able to afford the medication copay?  Yes   Payment Method  zero copay   Days supply of Refill  28   Would patient like to speak to a pharmacist?  No   Do you want to trigger an intervention?  No   Do you want to trigger an additional referral task?  No   Refill activity completed?  Yes   Refill activity plan  Refill scheduled   Shipment/Pickup Date:  11/16/20          Current Outpatient Medications   Medication Sig    adalimumab (HUMIRA PEN) 40 mg/0.8 mL PnKt Inject 1 pen (40 mg total) into the skin every 14 (fourteen) days.    buPROPion (WELLBUTRIN XL) 300 MG 24 hr tablet Take 300 mg by mouth once daily.     clonazePAM (KLONOPIN) 1 MG tablet Take 0.5 mg by mouth once daily.     dextroamphetamine-amphetamine (ADDERALL XR) 20 MG 24 hr capsule Take 20 mg by mouth every morning.     dextroamphetamine-amphetamine (ADDERALL XR) 30 MG 24 hr capsule Take 30 mg by mouth every morning.    loratadine (CLARITIN) 10 mg tablet Take 1 tablet (10 mg total) by mouth once daily.    metoprolol succinate (TOPROL-XL) 25 MG 24 hr tablet TAKE 1 TABLET BY MOUTH ONCE DAILY    metoprolol succinate (TOPROL-XL) 25 MG 24 hr tablet Take 1 tablet (25 mg total) by mouth once daily.    predniSONE (DELTASONE) 5 MG tablet TAKE ONE TABLET (5 MG TOTAL) BY MOUTH ONCE DAILY    sertraline (ZOLOFT) 25 MG tablet Take 25 mg by mouth once daily.     sertraline (ZOLOFT) 50 MG tablet Take 75 mg by mouth once daily.     tiZANidine 4 mg Cap Take 4 mg by mouth 2 (two) times daily as needed.   Last reviewed on 6/17/2020 11:21 AM by [address],     Review of patient's allergies indicates:   Allergen Reactions    Gabapentin Other (See Comments)     Increased pain level    Lithium analogues Other (See Comments)     Suicidal thoughts    Nortriptyline Other (See Comments)     Paradoxical effect: sleepless, anxious and increased pain levels    Topamax [topiramate] Other (See Comments)     Lost vision     Sudafed [pseudoephedrine hcl]     Pseudoephedrine Anxiety    Last reviewed on  7/15/2020 1:38 PM by Mildred Solano      Tasks added this encounter   No tasks added.   Tasks due within next 3 months   11/10/2020 - Refill Call     Kiana Stanley  [facility]  [address] [place] [id number]  Phone: [phone]  Fax: [phone]

## 2020-11-23 ENCOUNTER — OFFICE VISIT (OUTPATIENT)
Dept: URGENT CARE | Facility: CLINIC | Age: 40
End: 2020-11-23
Payer: MEDICAID

## 2020-11-23 VITALS
DIASTOLIC BLOOD PRESSURE: 68 MMHG | HEART RATE: 76 BPM | BODY MASS INDEX: 21.95 KG/M2 | OXYGEN SATURATION: 99 % | SYSTOLIC BLOOD PRESSURE: 100 MMHG | WEIGHT: 120 LBS | RESPIRATION RATE: 16 BRPM | TEMPERATURE: 99 F

## 2020-11-23 DIAGNOSIS — U07.1 COVID-19 VIRUS DETECTED: ICD-10-CM

## 2020-11-23 DIAGNOSIS — U07.1 COVID-19 VIRUS INFECTION: Primary | ICD-10-CM

## 2020-11-23 DIAGNOSIS — R50.9 FEVER, UNSPECIFIED FEVER CAUSE: ICD-10-CM

## 2020-11-23 LAB
CTP QC/QA: YES
SARS-COV-2 RDRP RESP QL NAA+PROBE: POSITIVE

## 2020-11-23 PROCEDURE — 99214 OFFICE O/P EST MOD 30 MIN: CPT | Mod: S$GLB,,, | Performed by: PHYSICIAN ASSISTANT

## 2020-11-23 PROCEDURE — 99214 PR OFFICE/OUTPT VISIT, EST, LEVL IV, 30-39 MIN: ICD-10-PCS | Mod: S$GLB,,, | Performed by: PHYSICIAN ASSISTANT

## 2020-11-23 PROCEDURE — U0002 COVID-19 LAB TEST NON-CDC: HCPCS | Mod: QW,S$GLB,, | Performed by: PHYSICIAN ASSISTANT

## 2020-11-23 PROCEDURE — U0002: ICD-10-PCS | Mod: QW,S$GLB,, | Performed by: PHYSICIAN ASSISTANT

## 2020-11-23 RX ORDER — SERTRALINE HYDROCHLORIDE 100 MG/1
100 TABLET, FILM COATED ORAL DAILY
COMMUNITY

## 2020-11-23 NOTE — PROGRESS NOTES
Subjective:       Patient ID: Dorothy Jesus is a 40 y.o. female.    Vitals:  weight is 54.4 kg (120 lb). Her temperature is 98.5 °F (36.9 °C). Her blood pressure is 100/68 and her pulse is 76. Her respiration is 16 and oxygen saturation is 99%.     Chief Complaint: Generalized Body Aches    Patient is with daughter on exam. Patient presents to urgent care with body aches, fatigue, fever, diarrhea and cough x 6 days. Patient reports positive exposure to COVID-19 at home - patient's .  Fatigue  This is a new problem. The current episode started in the past 7 days. The problem occurs intermittently. The problem has been waxing and waning. Associated symptoms include congestion, coughing, fatigue, a fever, myalgias and a sore throat. Pertinent negatives include no abdominal pain, anorexia, arthralgias, change in bowel habit, chest pain, chills, diaphoresis, headaches, joint swelling, nausea, neck pain, numbness, rash, swollen glands, urinary symptoms, visual change, vomiting or weakness. Nothing aggravates the symptoms. She has tried nothing for the symptoms.       Constitution: Positive for fatigue and fever. Negative for chills and sweating.   HENT: Positive for congestion, postnasal drip, sinus pressure and sore throat. Negative for ear pain, drooling, nosebleeds, foreign body in nose, sinus pain, trouble swallowing and voice change.    Neck: Negative for neck pain, neck stiffness, painful lymph nodes and neck swelling.   Cardiovascular: Negative for chest pain, leg swelling, palpitations, sob on exertion and passing out.   Eyes: Negative for eye pain, eye redness, photophobia, double vision, blurred vision and eyelid swelling.   Respiratory: Positive for cough. Negative for chest tightness, sputum production, bloody sputum, shortness of breath, stridor and wheezing.    Gastrointestinal: Positive for diarrhea. Negative for abdominal pain, abdominal bloating, nausea, vomiting, constipation and  heartburn.   Musculoskeletal: Positive for muscle ache. Negative for joint pain, joint swelling, abnormal ROM of joint, back pain and muscle cramps.   Skin: Negative for rash and hives.   Allergic/Immunologic: Negative for seasonal allergies, food allergies, hives, itching and sneezing.   Neurological: Negative for dizziness, light-headedness, passing out, facial drooping, speech difficulty, loss of balance, headaches, altered mental status, loss of consciousness, numbness, tingling and seizures.   Hematologic/Lymphatic: Negative for swollen lymph nodes.   Psychiatric/Behavioral: Negative for altered mental status and nervous/anxious. The patient is not nervous/anxious.        Objective:      Physical Exam   Constitutional: She is oriented to person, place, and time. She appears well-developed. She is cooperative.  Non-toxic appearance. She does not appear ill. No distress.   HENT:   Head: Normocephalic and atraumatic.   Ears:   Right Ear: Hearing, tympanic membrane, external ear and ear canal normal.   Left Ear: Hearing, tympanic membrane, external ear and ear canal normal.   Nose: Mucosal edema and rhinorrhea present. No nasal deformity. No epistaxis. Right sinus exhibits no maxillary sinus tenderness and no frontal sinus tenderness. Left sinus exhibits no maxillary sinus tenderness and no frontal sinus tenderness.   Mouth/Throat: Uvula is midline and mucous membranes are normal. No trismus in the jaw. Normal dentition. No uvula swelling. Posterior oropharyngeal erythema and cobblestoning present. No oropharyngeal exudate, posterior oropharyngeal edema or tonsillar abscesses. No tonsillar exudate.   Eyes: Conjunctivae and lids are normal. No scleral icterus.   Neck: Trachea normal, normal range of motion, full passive range of motion without pain and phonation normal. Neck supple. No neck rigidity. No edema and no erythema present.   Cardiovascular: Normal rate, regular rhythm, normal heart sounds and normal  pulses.   Pulmonary/Chest: Effort normal and breath sounds normal. No accessory muscle usage or stridor. No respiratory distress. She has no decreased breath sounds. She has no wheezes. She has no rhonchi. She has no rales.   Abdominal: Soft. Normal appearance and bowel sounds are normal. There is no abdominal tenderness. There is no rebound, no guarding, no left CVA tenderness and no right CVA tenderness.   Musculoskeletal: Normal range of motion.         General: No deformity.   Lymphadenopathy:     She has no cervical adenopathy.   Neurological: She is alert and oriented to person, place, and time. She has normal sensation. She exhibits normal muscle tone. Gait normal. Coordination normal.   Skin: Skin is warm, dry, intact, not diaphoretic, not pale and no rash. Capillary refill takes less than 2 seconds. Psychiatric: Her speech is normal and behavior is normal. Judgment and thought content normal.   Nursing note and vitals reviewed.        Results for orders placed or performed in visit on 11/23/20   POCT COVID-19 Rapid Screening   Result Value Ref Range    POC Rapid COVID Positive (A) Negative     Acceptable Yes        Assessment:       1. COVID-19 virus infection    2. Fever, unspecified fever cause        Plan:         COVID-19 virus infection    Fever, unspecified fever cause  -     POCT COVID-19 Rapid Screening      Patient Instructions   You must understand that you've received an Urgent Care treatment only and that you may be released before all your medical problems are known or treated. You, the patient, will arrange for follow up care as instructed.  Follow up with your PCP or specialty clinic as directed if not improved or as needed. You can call [phone] to schedule an appointment with the appropriate provider.  If your condition worsens we recommend that you receive another evaluation at the Emergency Department for any concerns or worsening of condition.  Patient aware and  verbalized understanding.    Reviewed COVID-19 results with patient.  Counseled patient and answered questions in regards to COVID-19 testing.  Advised patient to go home/quarantine, treat symptoms and avoid contact with others until symptoms are resolved.   Increase fluids and rest is important.  Humidifier use at home.  OTC Claritin or Zyrtec daily as needed for nasal congestion/postnasal drip/allergies.  OTC Flonase Nasal Spray as directed for nasal congestion/postnasal drip/allergies.  Advised patient to take OTC VITAMIN C and ZINC as discussed.  Alternate OTC Tylenol and Ibuprofen every 4-6 hours as needed for pain, headache, fever, etc.  Info given for virtual visit, covid 19 information line, state info line.   Advised patient to follow-up with PCP and/or Specialist for further evaluation as needed.   Strict ER precautions given to patient.  Follow local/state guidelines per covid emergency.   Patient aware, verbalized understanding and agreed with plan of care.    CDC RECOMMENDATIONS  --IF test results are NEGATIVE and NO known high risk exposure to covid-19 virus, you can be excluded from work/school until:  o MINIMUM OF 24 hours fever-free without the use of fever-reducing medications AND  o Improvement in symptoms (e.g. cough, shortness of breath, fatigue, GI symptoms, etc)     --IF YOU ARE BEING TESTED BECAUSE OF A HIGH RISK EXPOSURE, which the CDC defines as direct contact 6 feet or less for >15 minutes with a known positive person, you should follow CDC guidelines as well as your employer/school protocols for safely returning to work/school.   *Please be aware that there are False Negative possibilities with testing, so you should return to work/school based upon CDC guidelines, not simply a negative result, unless your employer/school has a different RTW protocol/guidance for you.     --IF test results are POSITIVE , you should be excluded from work/school until:  o At least 3 days (72 hours)  FEVER-FREE without the use of fever-reducing medications AND  o Improvement in symptoms (e.g., cough, shortness of breathing, fatigue, GI symptoms, etc) AND  o At least 10 days have passed since symptoms first appeared.    IF NOT IMPROVING, FOLLOW UP WITH VIRTUAL ONLINE VISIT WITH A PROVIDER 24/7 - FOR MORE INFORMATION OR TO DOWNLOAD THE URI, VISIT [facility] ANYWHERE [email or web address]  FOR 24/7 NURSE ADVICE, CALL [phone]  FOR COVID 19 RELATED QUESTIONS, CALL the [facility] covid hotline: [phone]  [place] FOR UP TO DATE INFORMATION: Text or dial 211, test keyword LACOVID -456 OR DIAL 211    HELPFUL EXTERNAL RESOURCES:  OFFICE OF PUBLIC HEALTH: [place] - http://ldh.la.gov/ and 1-284.242.8167  CENTER FOR DISEASE CONTROL - https://www.cdc.gov/   WORLD HEALTH ORGANIZATION (WHO) - https://www.who.int/   CDC WHEN TO QUARANTINE - https://www.cdc.gov/coronavirus/2019-ncov/if-you-are-sick/quarantine.html     INFO ABOUT [facility] COVID-19 RAPID TESTING:  This test utilizes isothermal nucleic acid amplification technology to detect the SARS-CoV-2 RdRp nucleic acid segment.   The analytical sensitivity (limit of detection) is 125 genome equivalents/mL.   A POSITIVE result implies infection with the SARS-CoV-2 virus; the patient is presumed to be contagious.     A NEGATIVE result means that SARS-CoV-2 nucleic acids are not present above the limit of detection.   A NEGATIVE result should be treated as presumptive. It does not rule out the possibility of COVID-19 and should not be the sole basis for treatment decisions.   This test is only for use under the Food and Drug Administration s Emergency Use Authorization (EUA).   Commercial kits are provided by [facility] [employer]. Performance characteristics of the EUA have been independently verified by [facility] Department of Pathology and Laboratory Medicine.   _________________________________________________________________   The authorized Fact  Sheet for Healthcare Providers and the authorized Fact Sheet for Patients of the ID NOW COVID-19 are available on the FDA website:   https://www.fda.gov/media/806153/download  https://www.fda.gov/media/047061/download

## 2020-11-23 NOTE — PATIENT INSTRUCTIONS
You must understand that you've received an Urgent Care treatment only and that you may be released before all your medical problems are known or treated. You, the patient, will arrange for follow up care as instructed.  Follow up with your PCP or specialty clinic as directed if not improved or as needed. You can call [phone] to schedule an appointment with the appropriate provider.  If your condition worsens we recommend that you receive another evaluation at the Emergency Department for any concerns or worsening of condition.  Patient aware and verbalized understanding.    Reviewed COVID-19 results with patient.  Counseled patient and answered questions in regards to COVID-19 testing.  Advised patient to go home/quarantine, treat symptoms and avoid contact with others until symptoms are resolved.   Increase fluids and rest is important.  Humidifier use at home.  OTC Claritin or Zyrtec daily as needed for nasal congestion/postnasal drip/allergies.  OTC Flonase Nasal Spray as directed for nasal congestion/postnasal drip/allergies.  Advised patient to take OTC VITAMIN C and ZINC as discussed.  Alternate OTC Tylenol and Ibuprofen every 4-6 hours as needed for pain, headache, fever, etc.  Info given for virtual visit, covid 19 information line, state info line.   Advised patient to follow-up with PCP and/or Specialist for further evaluation as needed.   Strict ER precautions given to patient.  Follow local/state guidelines per covid emergency.   Patient aware, verbalized understanding and agreed with plan of care.    CDC RECOMMENDATIONS  --IF test results are NEGATIVE and NO known high risk exposure to covid-19 virus, you can be excluded from work/school until:  o MINIMUM OF 24 hours fever-free without the use of fever-reducing medications AND  o Improvement in symptoms (e.g. cough, shortness of breath, fatigue, GI symptoms, etc)     --IF YOU ARE BEING TESTED BECAUSE OF A HIGH RISK EXPOSURE, which the CDC defines  as direct contact 6 feet or less for >15 minutes with a known positive person, you should follow CDC guidelines as well as your employer/school protocols for safely returning to work/school.   *Please be aware that there are False Negative possibilities with testing, so you should return to work/school based upon CDC guidelines, not simply a negative result, unless your employer/school has a different RTW protocol/guidance for you.     --IF test results are POSITIVE , you should be excluded from work/school until:  o At least 3 days (72 hours) FEVER-FREE without the use of fever-reducing medications AND  o Improvement in symptoms (e.g., cough, shortness of breathing, fatigue, GI symptoms, etc) AND  o At least 10 days have passed since symptoms first appeared.    IF NOT IMPROVING, FOLLOW UP WITH VIRTUAL ONLINE VISIT WITH A PROVIDER 24/7 - FOR MORE INFORMATION OR TO DOWNLOAD THE URI, VISIT [facility] ANYWHERE [facility]/ANYWHERE  FOR 24/7 NURSE ADVICE, CALL [phone]  FOR COVID 19 RELATED QUESTIONS, CALL the [facility] covid hotline: [phone]  [place] FOR UP TO DATE INFORMATION: Text or dial 211, test keyword LACOVID -791 OR DIAL 211    HELPFUL EXTERNAL RESOURCES:  OFFICE OF PUBLIC HEALTH: [place] - http://ldh.la.gov/ and 1-311.480.4144  CENTER FOR DISEASE CONTROL - https://www.cdc.gov/   WORLD HEALTH ORGANIZATION (WHO) - https://www.who.int/   CDC WHEN TO QUARANTINE - https://www.cdc.gov/coronavirus/2019-ncov/if-you-are-sick/quarantine.html     INFO ABOUT [facility] COVID-19 RAPID TESTING:  This test utilizes isothermal nucleic acid amplification technology to detect the SARS-CoV-2 RdRp nucleic acid segment.   The analytical sensitivity (limit of detection) is 125 genome equivalents/mL.   A POSITIVE result implies infection with the SARS-CoV-2 virus; the patient is presumed to be contagious.     A NEGATIVE result means that SARS-CoV-2 nucleic acids are not present above the limit of detection.   A  NEGATIVE result should be treated as presumptive. It does not rule out the possibility of COVID-19 and should not be the sole basis for treatment decisions.   This test is only for use under the Food and Drug Administration s Emergency Use Authorization (EUA).   Commercial kits are provided by [employer]. Performance characteristics of the EUA have been independently verified by [facility] Department of Pathology and Laboratory Medicine.   _________________________________________________________________   The authorized Fact Sheet for Healthcare Providers and the authorized Fact Sheet for Patients of the ID NOW COVID-19 are available on the FDA website:   https://www.fda.gov/media/320539/download  https://www.fda.gov/media/927143/download

## 2020-11-24 ENCOUNTER — TELEPHONE (OUTPATIENT)
Dept: RHEUMATOLOGY | Facility: CLINIC | Age: 40
End: 2020-11-24

## 2020-11-25 NOTE — TELEPHONE ENCOUNTER
LVM with Dr. St's instructions to stop Humira until totally well and do not exceed 5 mg prednisone daily. Call back number is given if there are any questions. CG

## 2020-11-25 NOTE — TELEPHONE ENCOUNTER
Patient dx with SARS-COV-2   DC humira   Ok to continue Prednisone but not to exceed 5mg daily.   Dr. St

## 2020-12-03 ENCOUNTER — PATIENT MESSAGE (OUTPATIENT)
Dept: RHEUMATOLOGY | Facility: CLINIC | Age: 40
End: 2020-12-03

## 2020-12-21 ENCOUNTER — PATIENT MESSAGE (OUTPATIENT)
Dept: PHARMACY | Facility: CLINIC | Age: 40
End: 2020-12-21

## 2020-12-28 ENCOUNTER — SPECIALTY PHARMACY (OUTPATIENT)
Dept: PHARMACY | Facility: CLINIC | Age: 40
End: 2020-12-28

## 2020-12-28 NOTE — TELEPHONE ENCOUNTER
Specialty Pharmacy - Refill Coordination    Specialty Medication Orders Linked to Encounter      Most Recent Value   Medication #1  adalimumab (HUMIRA PEN) 40 mg/0.8 mL PnKt (Order#[id number], Rx#[id number])          Refill Questions - Documented Responses      Most Recent Value   Relationship to patient of person spoken to?  Self   HIPAA/medical authority confirmed?  Yes   Any changes in contact preferences or allowed representatives?  No   Has the patient had any insurance changes?  No   Has the patient had any changes to specialty medication, dose, or instructions?  No   Has the patient started taking any new medications, herbals, or supplements?  No   Has the patient been diagnosed with any new medical conditions?  No   Does the patient have any new allergies to medications or foods?  No   Does the patient have any concerns about side effects?  No   Can the patient store medication/sharps container properly (at the correct temperature, away from children/pets, etc.)?  Yes   Can the patient call emergency services (911) in the event of an emergency?  Yes   Does the patient have any concerns or questions about taking or administering this medication as prescribed?  No   How many doses did the patient miss in the past 4 weeks or since the last fill?  0   How many doses does the patient have on hand?  0   How many days does the patient report on hand quantity will last?  0   Does the number of doses/days supply remaining match pharmacy expected amounts?  Yes   Does the patient feel that this medication is effective?  Yes   During the past 4 weeks, has patient missed any activities due to condition or medication?  No   During the past 4 weeks, did patient have any of the following urgent care visits?  None   How will the patient receive the medication?  Mail   When does the patient need to receive the medication?  01/08/21   Shipping Address  Home   Address in [facility] confirmed and updated if neccessary?   Yes   Expected Copay ($)  28   Is the patient able to afford the medication copay?  Yes   Payment Method  zero copay   Days supply of Refill  28   Would patient like to speak to a pharmacist?  No   Do you want to trigger an intervention?  No   Do you want to trigger an additional referral task?  No   Refill activity completed?  Yes   Refill activity plan  Refill scheduled   Shipment/Pickup Date:  01/06/21          Current Outpatient Medications   Medication Sig    adalimumab (HUMIRA PEN) 40 mg/0.8 mL PnKt Inject 1 pen (40 mg total) into the skin every 14 (fourteen) days.    buPROPion (WELLBUTRIN XL) 300 MG 24 hr tablet Take 300 mg by mouth once daily.     clonazePAM (KLONOPIN) 1 MG tablet Take 0.5 mg by mouth once daily.     dextroamphetamine-amphetamine (ADDERALL XR) 20 MG 24 hr capsule Take 20 mg by mouth every morning.     dextroamphetamine-amphetamine (ADDERALL XR) 30 MG 24 hr capsule Take 30 mg by mouth every morning.    loratadine (CLARITIN) 10 mg tablet Take 1 tablet (10 mg total) by mouth once daily.    metoprolol succinate (TOPROL-XL) 25 MG 24 hr tablet TAKE 1 TABLET BY MOUTH ONCE DAILY    metoprolol succinate (TOPROL-XL) 25 MG 24 hr tablet Take 1 tablet (25 mg total) by mouth once daily.    predniSONE (DELTASONE) 5 MG tablet TAKE ONE TABLET (5 MG TOTAL) BY MOUTH ONCE DAILY    sertraline (ZOLOFT) 100 MG tablet Take 100 mg by mouth once daily.    sertraline (ZOLOFT) 25 MG tablet Take 25 mg by mouth once daily.     sertraline (ZOLOFT) 50 MG tablet Take 75 mg by mouth once daily.     tiZANidine 4 mg Cap Take 4 mg by mouth 2 (two) times daily as needed.   Last reviewed on 11/23/2020  5:39 PM by Tamar Odell PA-C    Review of patient's allergies indicates:   Allergen Reactions    Gabapentin Other (See Comments)     Increased pain level    Lithium analogues Other (See Comments)     Suicidal thoughts    Nortriptyline Other (See Comments)     Paradoxical effect: sleepless, anxious and  increased pain levels    Topamax [topiramate] Other (See Comments)     Lost vision    Sudafed [pseudoephedrine hcl]     Pseudoephedrine Anxiety    Last reviewed on  11/23/2020 5:39 PM by Tamar Odell      Tasks added this encounter   No tasks added.   Tasks due within next 3 months   12/6/2020 - Refill Call (Auto Added)     Kiana Stanley  [facility]  [address] [place] [id number]  Phone: [phone]  Fax: [phone]

## 2021-01-04 ENCOUNTER — PATIENT MESSAGE (OUTPATIENT)
Dept: ADMINISTRATIVE | Facility: HOSPITAL | Age: 41
End: 2021-01-04

## 2021-01-27 DIAGNOSIS — M06.00 SERONEGATIVE RHEUMATOID ARTHRITIS: ICD-10-CM

## 2021-01-27 RX ORDER — ADALIMUMAB 40MG/0.8ML
40 KIT SUBCUTANEOUS
Qty: 2 PEN | Refills: 6 | Status: CANCELLED | OUTPATIENT
Start: 2021-01-27 | End: 2022-01-27

## 2021-01-29 DIAGNOSIS — R53.81 MALAISE AND FATIGUE: ICD-10-CM

## 2021-01-29 DIAGNOSIS — R53.83 MALAISE AND FATIGUE: ICD-10-CM

## 2021-01-29 DIAGNOSIS — M06.00 SERONEGATIVE RHEUMATOID ARTHRITIS: ICD-10-CM

## 2021-01-29 DIAGNOSIS — Z79.899 HIGH RISK MEDICATIONS (NOT ANTICOAGULANTS) LONG-TERM USE: Primary | ICD-10-CM

## 2021-02-03 RX ORDER — ADALIMUMAB 40MG/0.8ML
40 KIT SUBCUTANEOUS
Qty: 2 PEN | Refills: 6 | Status: SHIPPED | OUTPATIENT
Start: 2021-02-03 | End: 2021-04-20

## 2021-02-05 ENCOUNTER — LAB VISIT (OUTPATIENT)
Dept: LAB | Facility: HOSPITAL | Age: 41
End: 2021-02-05
Attending: INTERNAL MEDICINE
Payer: MEDICAID

## 2021-02-05 DIAGNOSIS — M06.00 SERONEGATIVE RHEUMATOID ARTHRITIS: ICD-10-CM

## 2021-02-05 DIAGNOSIS — Z79.899 HIGH RISK MEDICATIONS (NOT ANTICOAGULANTS) LONG-TERM USE: ICD-10-CM

## 2021-02-05 DIAGNOSIS — R53.83 MALAISE AND FATIGUE: ICD-10-CM

## 2021-02-05 DIAGNOSIS — R53.81 MALAISE AND FATIGUE: ICD-10-CM

## 2021-02-05 PROCEDURE — 86140 C-REACTIVE PROTEIN: CPT

## 2021-02-05 PROCEDURE — 80053 COMPREHEN METABOLIC PANEL: CPT

## 2021-02-05 PROCEDURE — 80074 ACUTE HEPATITIS PANEL: CPT

## 2021-02-05 PROCEDURE — 85025 COMPLETE CBC W/AUTO DIFF WBC: CPT

## 2021-02-05 PROCEDURE — 85652 RBC SED RATE AUTOMATED: CPT

## 2021-02-06 LAB
ALBUMIN SERPL BCP-MCNC: 4.3 G/DL (ref 3.5–5.2)
ALP SERPL-CCNC: 71 U/L (ref 55–135)
ALT SERPL W/O P-5'-P-CCNC: 19 U/L (ref 10–44)
ANION GAP SERPL CALC-SCNC: 11 MMOL/L (ref 8–16)
AST SERPL-CCNC: 19 U/L (ref 10–40)
BASOPHILS # BLD AUTO: 0.06 K/UL (ref 0–0.2)
BASOPHILS NFR BLD: 0.6 % (ref 0–1.9)
BILIRUB SERPL-MCNC: 0.3 MG/DL (ref 0.1–1)
BUN SERPL-MCNC: 11 MG/DL (ref 6–20)
CALCIUM SERPL-MCNC: 9.2 MG/DL (ref 8.7–10.5)
CHLORIDE SERPL-SCNC: 103 MMOL/L (ref 95–110)
CO2 SERPL-SCNC: 24 MMOL/L (ref 23–29)
CREAT SERPL-MCNC: 0.9 MG/DL (ref 0.5–1.4)
CRP SERPL-MCNC: 0.7 MG/L (ref 0–8.2)
DIFFERENTIAL METHOD: ABNORMAL
EOSINOPHIL # BLD AUTO: 0 K/UL (ref 0–0.5)
EOSINOPHIL NFR BLD: 0.2 % (ref 0–8)
ERYTHROCYTE [DISTWIDTH] IN BLOOD BY AUTOMATED COUNT: 12.2 % (ref 11.5–14.5)
ERYTHROCYTE [SEDIMENTATION RATE] IN BLOOD BY WESTERGREN METHOD: 14 MM/HR (ref 0–36)
EST. GFR  (AFRICAN AMERICAN): >60 ML/MIN/1.73 M^2
EST. GFR  (NON AFRICAN AMERICAN): >60 ML/MIN/1.73 M^2
GLUCOSE SERPL-MCNC: 93 MG/DL (ref 70–110)
HCT VFR BLD AUTO: 42.9 % (ref 37–48.5)
HGB BLD-MCNC: 14.3 G/DL (ref 12–16)
IMM GRANULOCYTES # BLD AUTO: 0.04 K/UL (ref 0–0.04)
IMM GRANULOCYTES NFR BLD AUTO: 0.4 % (ref 0–0.5)
LYMPHOCYTES # BLD AUTO: 1.9 K/UL (ref 1–4.8)
LYMPHOCYTES NFR BLD: 19.1 % (ref 18–48)
MCH RBC QN AUTO: 31.5 PG (ref 27–31)
MCHC RBC AUTO-ENTMCNC: 33.3 G/DL (ref 32–36)
MCV RBC AUTO: 95 FL (ref 82–98)
MONOCYTES # BLD AUTO: 0.3 K/UL (ref 0.3–1)
MONOCYTES NFR BLD: 3 % (ref 4–15)
NEUTROPHILS # BLD AUTO: 7.8 K/UL (ref 1.8–7.7)
NEUTROPHILS NFR BLD: 76.7 % (ref 38–73)
NRBC BLD-RTO: 0 /100 WBC
PLATELET # BLD AUTO: 250 K/UL (ref 150–350)
PMV BLD AUTO: 12.5 FL (ref 9.2–12.9)
POTASSIUM SERPL-SCNC: 4.1 MMOL/L (ref 3.5–5.1)
PROT SERPL-MCNC: 7.6 G/DL (ref 6–8.4)
RBC # BLD AUTO: 4.54 M/UL (ref 4–5.4)
SODIUM SERPL-SCNC: 138 MMOL/L (ref 136–145)
WBC # BLD AUTO: 10.11 K/UL (ref 3.9–12.7)

## 2021-02-11 ENCOUNTER — SPECIALTY PHARMACY (OUTPATIENT)
Dept: PHARMACY | Facility: CLINIC | Age: 41
End: 2021-02-11

## 2021-02-18 DIAGNOSIS — R00.0 TACHYCARDIA: ICD-10-CM

## 2021-02-22 RX ORDER — METOPROLOL SUCCINATE 25 MG/1
TABLET, EXTENDED RELEASE ORAL
Qty: 90 TABLET | Refills: 0 | Status: SHIPPED | OUTPATIENT
Start: 2021-02-22 | End: 2021-02-25 | Stop reason: SDUPTHER

## 2021-02-25 ENCOUNTER — TELEPHONE (OUTPATIENT)
Dept: FAMILY MEDICINE | Facility: CLINIC | Age: 41
End: 2021-02-25

## 2021-02-25 ENCOUNTER — OFFICE VISIT (OUTPATIENT)
Dept: FAMILY MEDICINE | Facility: CLINIC | Age: 41
End: 2021-02-25
Payer: MEDICAID

## 2021-02-25 VITALS
HEART RATE: 122 BPM | TEMPERATURE: 98 F | DIASTOLIC BLOOD PRESSURE: 70 MMHG | OXYGEN SATURATION: 96 % | HEIGHT: 62 IN | BODY MASS INDEX: 24.54 KG/M2 | RESPIRATION RATE: 18 BRPM | WEIGHT: 133.38 LBS | SYSTOLIC BLOOD PRESSURE: 92 MMHG

## 2021-02-25 DIAGNOSIS — G43.909 MIGRAINE WITHOUT STATUS MIGRAINOSUS, NOT INTRACTABLE, UNSPECIFIED MIGRAINE TYPE: ICD-10-CM

## 2021-02-25 DIAGNOSIS — Z12.4 SCREENING FOR CERVICAL CANCER: ICD-10-CM

## 2021-02-25 DIAGNOSIS — Z12.39 ENCOUNTER FOR SCREENING FOR MALIGNANT NEOPLASM OF BREAST, UNSPECIFIED SCREENING MODALITY: ICD-10-CM

## 2021-02-25 DIAGNOSIS — Z00.00 WELLNESS EXAMINATION: Primary | ICD-10-CM

## 2021-02-25 DIAGNOSIS — F31.81 BIPOLAR 2 DISORDER: ICD-10-CM

## 2021-02-25 DIAGNOSIS — R00.0 TACHYCARDIA: ICD-10-CM

## 2021-02-25 PROCEDURE — 99999 PR PBB SHADOW E&M-EST. PATIENT-LVL V: ICD-10-PCS | Mod: PBBFAC,,, | Performed by: FAMILY MEDICINE

## 2021-02-25 PROCEDURE — 99215 OFFICE O/P EST HI 40 MIN: CPT | Mod: PBBFAC,PO | Performed by: FAMILY MEDICINE

## 2021-02-25 PROCEDURE — 99396 PREV VISIT EST AGE 40-64: CPT | Mod: S$PBB,,, | Performed by: FAMILY MEDICINE

## 2021-02-25 PROCEDURE — 99396 PR PREVENTIVE VISIT,EST,40-64: ICD-10-PCS | Mod: S$PBB,,, | Performed by: FAMILY MEDICINE

## 2021-02-25 PROCEDURE — 99999 PR PBB SHADOW E&M-EST. PATIENT-LVL V: CPT | Mod: PBBFAC,,, | Performed by: FAMILY MEDICINE

## 2021-02-25 RX ORDER — CLONAZEPAM 0.5 MG/1
TABLET ORAL
COMMUNITY
Start: 2021-02-17

## 2021-02-25 RX ORDER — RAMELTEON 8 MG/1
8 TABLET ORAL NIGHTLY PRN
Qty: 30 TABLET | Refills: 2 | Status: SHIPPED | OUTPATIENT
Start: 2021-02-25 | End: 2022-06-20

## 2021-02-25 RX ORDER — METOPROLOL SUCCINATE 25 MG/1
TABLET, EXTENDED RELEASE ORAL
Qty: 90 TABLET | Refills: 3 | Status: SHIPPED | OUTPATIENT
Start: 2021-02-25 | End: 2022-03-30

## 2021-02-26 ENCOUNTER — DOCUMENTATION ONLY (OUTPATIENT)
Dept: FAMILY MEDICINE | Facility: CLINIC | Age: 41
End: 2021-02-26

## 2021-03-09 ENCOUNTER — PATIENT MESSAGE (OUTPATIENT)
Dept: FAMILY MEDICINE | Facility: CLINIC | Age: 41
End: 2021-03-09

## 2021-03-11 ENCOUNTER — SPECIALTY PHARMACY (OUTPATIENT)
Dept: PHARMACY | Facility: CLINIC | Age: 41
End: 2021-03-11

## 2021-03-29 ENCOUNTER — HOSPITAL ENCOUNTER (OUTPATIENT)
Dept: RADIOLOGY | Facility: HOSPITAL | Age: 41
Discharge: HOME | End: 2021-03-29
Attending: FAMILY MEDICINE
Payer: MEDICAID

## 2021-03-29 DIAGNOSIS — Z12.31 BREAST CANCER SCREENING BY MAMMOGRAM: ICD-10-CM

## 2021-03-29 DIAGNOSIS — Z12.39 ENCOUNTER FOR SCREENING FOR MALIGNANT NEOPLASM OF BREAST, UNSPECIFIED SCREENING MODALITY: ICD-10-CM

## 2021-03-29 PROCEDURE — 77067 SCR MAMMO BI INCL CAD: CPT | Mod: TC,PO

## 2021-03-29 PROCEDURE — 77063 BREAST TOMOSYNTHESIS BI: CPT | Mod: 26,,, | Performed by: RADIOLOGY

## 2021-03-29 PROCEDURE — 77067 SCR MAMMO BI INCL CAD: CPT | Mod: 26,,, | Performed by: RADIOLOGY

## 2021-03-29 PROCEDURE — 77063 MAMMO DIGITAL SCREENING BILAT WITH TOMO: ICD-10-PCS | Mod: 26,,, | Performed by: RADIOLOGY

## 2021-03-29 PROCEDURE — 77067 MAMMO DIGITAL SCREENING BILAT WITH TOMO: ICD-10-PCS | Mod: 26,,, | Performed by: RADIOLOGY

## 2021-03-31 ENCOUNTER — TELEPHONE (OUTPATIENT)
Dept: RADIOLOGY | Facility: HOSPITAL | Age: 41
End: 2021-03-31

## 2021-04-06 ENCOUNTER — PATIENT MESSAGE (OUTPATIENT)
Dept: ADMINISTRATIVE | Facility: HOSPITAL | Age: 41
End: 2021-04-06

## 2021-04-08 ENCOUNTER — SPECIALTY PHARMACY (OUTPATIENT)
Dept: PHARMACY | Facility: CLINIC | Age: 41
End: 2021-04-08

## 2021-04-12 ENCOUNTER — HOSPITAL ENCOUNTER (OUTPATIENT)
Dept: RADIOLOGY | Facility: HOSPITAL | Age: 41
Discharge: HOME | End: 2021-04-12
Attending: FAMILY MEDICINE
Payer: MEDICAID

## 2021-04-12 DIAGNOSIS — R92.8 ABNORMAL MAMMOGRAM OF LEFT BREAST: ICD-10-CM

## 2021-04-12 PROCEDURE — 77061 BREAST TOMOSYNTHESIS UNI: CPT | Mod: TC,PO,LT

## 2021-04-12 PROCEDURE — 77065 DX MAMMO INCL CAD UNI: CPT | Mod: 26,LT,, | Performed by: RADIOLOGY

## 2021-04-12 PROCEDURE — 77065 MAMMO DIGITAL DIAGNOSTIC LEFT WITH TOMO: ICD-10-PCS | Mod: 26,LT,, | Performed by: RADIOLOGY

## 2021-04-12 PROCEDURE — 77061 BREAST TOMOSYNTHESIS UNI: CPT | Mod: 26,LT,, | Performed by: RADIOLOGY

## 2021-04-12 PROCEDURE — 77061 MAMMO DIGITAL DIAGNOSTIC LEFT WITH TOMO: ICD-10-PCS | Mod: 26,LT,, | Performed by: RADIOLOGY

## 2021-04-15 ENCOUNTER — IMMUNIZATION (OUTPATIENT)
Dept: FAMILY MEDICINE | Facility: CLINIC | Age: 41
End: 2021-04-15
Payer: MEDICAID

## 2021-04-15 DIAGNOSIS — Z23 NEED FOR VACCINATION: Primary | ICD-10-CM

## 2021-04-15 PROCEDURE — 91300 COVID-19, MRNA, LNP-S, PF, 30 MCG/0.3 ML DOSE VACCINE: CPT | Mod: PBBFAC,PO

## 2021-04-20 ENCOUNTER — OFFICE VISIT (OUTPATIENT)
Dept: RHEUMATOLOGY | Facility: CLINIC | Age: 41
End: 2021-04-20
Payer: MEDICAID

## 2021-04-20 VITALS — HEIGHT: 62 IN | BODY MASS INDEX: 24.66 KG/M2 | WEIGHT: 134 LBS

## 2021-04-20 DIAGNOSIS — M79.7 FIBROMYALGIA: ICD-10-CM

## 2021-04-20 DIAGNOSIS — R76.8 ANA POSITIVE: ICD-10-CM

## 2021-04-20 DIAGNOSIS — M06.4 INFLAMMATORY POLYARTHRITIS: ICD-10-CM

## 2021-04-20 DIAGNOSIS — M06.00 SERONEGATIVE RHEUMATOID ARTHRITIS: Primary | ICD-10-CM

## 2021-04-20 PROCEDURE — 99214 PR OFFICE/OUTPT VISIT, EST, LEVL IV, 30-39 MIN: ICD-10-PCS | Mod: 95,,, | Performed by: INTERNAL MEDICINE

## 2021-04-20 PROCEDURE — 99214 OFFICE O/P EST MOD 30 MIN: CPT | Mod: 95,,, | Performed by: INTERNAL MEDICINE

## 2021-04-20 RX ORDER — ETANERCEPT 50 MG/ML
50 SOLUTION SUBCUTANEOUS WEEKLY
Qty: 4 ML | Refills: 11 | Status: SHIPPED | OUTPATIENT
Start: 2021-04-20 | End: 2022-04-20 | Stop reason: SDUPTHER

## 2021-04-21 ENCOUNTER — SPECIALTY PHARMACY (OUTPATIENT)
Dept: PHARMACY | Facility: CLINIC | Age: 41
End: 2021-04-21

## 2021-04-29 ENCOUNTER — SPECIALTY PHARMACY (OUTPATIENT)
Dept: PHARMACY | Facility: CLINIC | Age: 41
End: 2021-04-29

## 2021-05-24 ENCOUNTER — PATIENT MESSAGE (OUTPATIENT)
Dept: PHARMACY | Facility: CLINIC | Age: 41
End: 2021-05-24

## 2021-05-27 ENCOUNTER — SPECIALTY PHARMACY (OUTPATIENT)
Dept: PHARMACY | Facility: CLINIC | Age: 41
End: 2021-05-27

## 2021-06-18 ENCOUNTER — SPECIALTY PHARMACY (OUTPATIENT)
Dept: PHARMACY | Facility: CLINIC | Age: 41
End: 2021-06-18

## 2021-07-07 ENCOUNTER — PATIENT MESSAGE (OUTPATIENT)
Dept: ADMINISTRATIVE | Facility: HOSPITAL | Age: 41
End: 2021-07-07

## 2021-07-15 ENCOUNTER — SPECIALTY PHARMACY (OUTPATIENT)
Dept: PHARMACY | Facility: CLINIC | Age: 41
End: 2021-07-15

## 2021-08-17 ENCOUNTER — PATIENT MESSAGE (OUTPATIENT)
Dept: PHARMACY | Facility: CLINIC | Age: 41
End: 2021-08-17

## 2021-08-18 ENCOUNTER — SPECIALTY PHARMACY (OUTPATIENT)
Dept: PHARMACY | Facility: CLINIC | Age: 41
End: 2021-08-18

## 2021-08-27 ENCOUNTER — TELEPHONE (OUTPATIENT)
Dept: FAMILY MEDICINE | Facility: CLINIC | Age: 41
End: 2021-08-27

## 2021-09-07 ENCOUNTER — TELEPHONE (OUTPATIENT)
Dept: FAMILY MEDICINE | Facility: CLINIC | Age: 41
End: 2021-09-07

## 2021-09-08 ENCOUNTER — IMMUNIZATION (OUTPATIENT)
Dept: FAMILY MEDICINE | Facility: CLINIC | Age: 41
End: 2021-09-08
Payer: MEDICAID

## 2021-09-08 DIAGNOSIS — Z23 NEED FOR VACCINATION: Primary | ICD-10-CM

## 2021-09-08 PROCEDURE — 91300 COVID-19, MRNA, LNP-S, PF, 30 MCG/0.3 ML DOSE VACCINE: ICD-10-PCS | Mod: ,,, | Performed by: FAMILY MEDICINE

## 2021-09-08 PROCEDURE — 0003A COVID-19, MRNA, LNP-S, PF, 30 MCG/0.3 ML DOSE VACCINE: ICD-10-PCS | Mod: CV19,,, | Performed by: FAMILY MEDICINE

## 2021-09-08 PROCEDURE — 0003A COVID-19, MRNA, LNP-S, PF, 30 MCG/0.3 ML DOSE VACCINE: CPT | Mod: CV19,,, | Performed by: FAMILY MEDICINE

## 2021-09-08 PROCEDURE — 91300 COVID-19, MRNA, LNP-S, PF, 30 MCG/0.3 ML DOSE VACCINE: CPT | Mod: ,,, | Performed by: FAMILY MEDICINE

## 2021-09-21 ENCOUNTER — SPECIALTY PHARMACY (OUTPATIENT)
Dept: PHARMACY | Facility: CLINIC | Age: 41
End: 2021-09-21
Payer: MEDICAID

## 2021-10-18 ENCOUNTER — PATIENT MESSAGE (OUTPATIENT)
Dept: PHARMACY | Facility: CLINIC | Age: 41
End: 2021-10-18
Payer: MEDICAID

## 2021-10-26 ENCOUNTER — SPECIALTY PHARMACY (OUTPATIENT)
Dept: PHARMACY | Facility: CLINIC | Age: 41
End: 2021-10-26
Payer: MEDICAID

## 2021-11-16 ENCOUNTER — OFFICE VISIT (OUTPATIENT)
Dept: URGENT CARE | Facility: CLINIC | Age: 41
End: 2021-11-16
Payer: MEDICAID

## 2021-11-16 VITALS
TEMPERATURE: 99 F | SYSTOLIC BLOOD PRESSURE: 111 MMHG | HEART RATE: 98 BPM | RESPIRATION RATE: 16 BRPM | OXYGEN SATURATION: 98 % | DIASTOLIC BLOOD PRESSURE: 76 MMHG | WEIGHT: 134 LBS | BODY MASS INDEX: 24.51 KG/M2

## 2021-11-16 DIAGNOSIS — L03.031 CELLULITIS OF GREAT TOE OF RIGHT FOOT: ICD-10-CM

## 2021-11-16 DIAGNOSIS — L98.0 PYOGENIC GRANULOMA: ICD-10-CM

## 2021-11-16 DIAGNOSIS — S90.411A ABRASION OF RIGHT GREAT TOE, INITIAL ENCOUNTER: Primary | ICD-10-CM

## 2021-11-16 PROCEDURE — 90715 TDAP VACCINE GREATER THAN OR EQUAL TO 7YO IM: ICD-10-PCS | Mod: S$GLB,,, | Performed by: EMERGENCY MEDICINE

## 2021-11-16 PROCEDURE — 99212 PR OFFICE/OUTPT VISIT, EST, LEVL II, 10-19 MIN: ICD-10-PCS | Mod: 25,S$GLB,, | Performed by: EMERGENCY MEDICINE

## 2021-11-16 PROCEDURE — 90715 TDAP VACCINE 7 YRS/> IM: CPT | Mod: S$GLB,,, | Performed by: EMERGENCY MEDICINE

## 2021-11-16 PROCEDURE — 99212 OFFICE O/P EST SF 10 MIN: CPT | Mod: 25,S$GLB,, | Performed by: EMERGENCY MEDICINE

## 2021-11-16 PROCEDURE — 90471 IMMUNIZATION ADMIN: CPT | Mod: S$GLB,,, | Performed by: EMERGENCY MEDICINE

## 2021-11-16 PROCEDURE — 90471 TDAP VACCINE GREATER THAN OR EQUAL TO 7YO IM: ICD-10-PCS | Mod: S$GLB,,, | Performed by: EMERGENCY MEDICINE

## 2021-11-16 RX ORDER — DOXYCYCLINE HYCLATE 100 MG
100 TABLET ORAL 2 TIMES DAILY
Qty: 14 TABLET | Refills: 0 | Status: SHIPPED | OUTPATIENT
Start: 2021-11-16 | End: 2021-11-23

## 2021-11-16 RX ORDER — HYDROCODONE BITARTRATE AND ACETAMINOPHEN 5; 325 MG/1; MG/1
1 TABLET ORAL EVERY 6 HOURS PRN
Qty: 8 TABLET | Refills: 0 | OUTPATIENT
Start: 2021-11-16 | End: 2021-12-20

## 2021-11-18 ENCOUNTER — SPECIALTY PHARMACY (OUTPATIENT)
Dept: PHARMACY | Facility: CLINIC | Age: 41
End: 2021-11-18
Payer: MEDICAID

## 2021-12-15 ENCOUNTER — LAB VISIT (OUTPATIENT)
Dept: LAB | Facility: HOSPITAL | Age: 41
End: 2021-12-15
Attending: INTERNAL MEDICINE
Payer: MEDICAID

## 2021-12-15 ENCOUNTER — OFFICE VISIT (OUTPATIENT)
Dept: RHEUMATOLOGY | Facility: CLINIC | Age: 41
End: 2021-12-15
Payer: MEDICAID

## 2021-12-15 VITALS
DIASTOLIC BLOOD PRESSURE: 78 MMHG | SYSTOLIC BLOOD PRESSURE: 117 MMHG | HEIGHT: 62 IN | WEIGHT: 147.63 LBS | BODY MASS INDEX: 27.17 KG/M2 | HEART RATE: 118 BPM

## 2021-12-15 DIAGNOSIS — R76.8 ANA POSITIVE: ICD-10-CM

## 2021-12-15 DIAGNOSIS — M06.4 INFLAMMATORY POLYARTHRITIS: ICD-10-CM

## 2021-12-15 DIAGNOSIS — M06.00 SERONEGATIVE RHEUMATOID ARTHRITIS: Primary | ICD-10-CM

## 2021-12-15 DIAGNOSIS — D84.9 IMMUNOSUPPRESSION: ICD-10-CM

## 2021-12-15 DIAGNOSIS — M79.7 FIBROMYALGIA: ICD-10-CM

## 2021-12-15 DIAGNOSIS — M06.00 SERONEGATIVE RHEUMATOID ARTHRITIS: ICD-10-CM

## 2021-12-15 LAB
ALBUMIN SERPL BCP-MCNC: 4.3 G/DL (ref 3.5–5.2)
ALP SERPL-CCNC: 82 U/L (ref 55–135)
ALT SERPL W/O P-5'-P-CCNC: 11 U/L (ref 10–44)
ANION GAP SERPL CALC-SCNC: 15 MMOL/L (ref 8–16)
AST SERPL-CCNC: 15 U/L (ref 10–40)
BASOPHILS # BLD AUTO: 0.08 K/UL (ref 0–0.2)
BASOPHILS NFR BLD: 0.7 % (ref 0–1.9)
BILIRUB SERPL-MCNC: 0.4 MG/DL (ref 0.1–1)
BUN SERPL-MCNC: 9 MG/DL (ref 6–20)
CALCIUM SERPL-MCNC: 9.6 MG/DL (ref 8.7–10.5)
CHLORIDE SERPL-SCNC: 101 MMOL/L (ref 95–110)
CO2 SERPL-SCNC: 22 MMOL/L (ref 23–29)
CREAT SERPL-MCNC: 1 MG/DL (ref 0.5–1.4)
CRP SERPL-MCNC: 2.8 MG/L (ref 0–8.2)
DIFFERENTIAL METHOD: ABNORMAL
EOSINOPHIL # BLD AUTO: 0 K/UL (ref 0–0.5)
EOSINOPHIL NFR BLD: 0.3 % (ref 0–8)
ERYTHROCYTE [DISTWIDTH] IN BLOOD BY AUTOMATED COUNT: 12.3 % (ref 11.5–14.5)
ERYTHROCYTE [SEDIMENTATION RATE] IN BLOOD BY WESTERGREN METHOD: 10 MM/HR (ref 0–20)
EST. GFR  (AFRICAN AMERICAN): >60 ML/MIN/1.73 M^2
EST. GFR  (NON AFRICAN AMERICAN): >60 ML/MIN/1.73 M^2
GLUCOSE SERPL-MCNC: 89 MG/DL (ref 70–110)
HCT VFR BLD AUTO: 43 % (ref 37–48.5)
HGB BLD-MCNC: 14.2 G/DL (ref 12–16)
IMM GRANULOCYTES # BLD AUTO: 0.03 K/UL (ref 0–0.04)
IMM GRANULOCYTES NFR BLD AUTO: 0.3 % (ref 0–0.5)
LYMPHOCYTES # BLD AUTO: 1.9 K/UL (ref 1–4.8)
LYMPHOCYTES NFR BLD: 17.5 % (ref 18–48)
MCH RBC QN AUTO: 30.7 PG (ref 27–31)
MCHC RBC AUTO-ENTMCNC: 33 G/DL (ref 32–36)
MCV RBC AUTO: 93 FL (ref 82–98)
MONOCYTES # BLD AUTO: 0.4 K/UL (ref 0.3–1)
MONOCYTES NFR BLD: 3.7 % (ref 4–15)
NEUTROPHILS # BLD AUTO: 8.4 K/UL (ref 1.8–7.7)
NEUTROPHILS NFR BLD: 77.5 % (ref 38–73)
NRBC BLD-RTO: 0 /100 WBC
PLATELET # BLD AUTO: 231 K/UL (ref 150–450)
PMV BLD AUTO: 12.6 FL (ref 9.2–12.9)
POTASSIUM SERPL-SCNC: 4 MMOL/L (ref 3.5–5.1)
PROT SERPL-MCNC: 7.9 G/DL (ref 6–8.4)
RBC # BLD AUTO: 4.63 M/UL (ref 4–5.4)
SODIUM SERPL-SCNC: 138 MMOL/L (ref 136–145)
WBC # BLD AUTO: 10.77 K/UL (ref 3.9–12.7)

## 2021-12-15 PROCEDURE — 99213 OFFICE O/P EST LOW 20 MIN: CPT | Mod: PBBFAC,PN | Performed by: INTERNAL MEDICINE

## 2021-12-15 PROCEDURE — 86140 C-REACTIVE PROTEIN: CPT | Performed by: INTERNAL MEDICINE

## 2021-12-15 PROCEDURE — 99999 PR PBB SHADOW E&M-EST. PATIENT-LVL III: ICD-10-PCS | Mod: PBBFAC,,, | Performed by: INTERNAL MEDICINE

## 2021-12-15 PROCEDURE — 85651 RBC SED RATE NONAUTOMATED: CPT | Mod: PO | Performed by: INTERNAL MEDICINE

## 2021-12-15 PROCEDURE — 80053 COMPREHEN METABOLIC PANEL: CPT | Performed by: INTERNAL MEDICINE

## 2021-12-15 PROCEDURE — 99214 PR OFFICE/OUTPT VISIT, EST, LEVL IV, 30-39 MIN: ICD-10-PCS | Mod: S$PBB,,, | Performed by: INTERNAL MEDICINE

## 2021-12-15 PROCEDURE — 36415 COLL VENOUS BLD VENIPUNCTURE: CPT | Mod: PO | Performed by: INTERNAL MEDICINE

## 2021-12-15 PROCEDURE — 99214 OFFICE O/P EST MOD 30 MIN: CPT | Mod: S$PBB,,, | Performed by: INTERNAL MEDICINE

## 2021-12-15 PROCEDURE — 99999 PR PBB SHADOW E&M-EST. PATIENT-LVL III: CPT | Mod: PBBFAC,,, | Performed by: INTERNAL MEDICINE

## 2021-12-15 PROCEDURE — 85025 COMPLETE CBC W/AUTO DIFF WBC: CPT | Performed by: INTERNAL MEDICINE

## 2021-12-15 RX ORDER — MELOXICAM 15 MG/1
15 TABLET ORAL DAILY
Qty: 30 TABLET | Refills: 6 | Status: SHIPPED | OUTPATIENT
Start: 2021-12-15 | End: 2022-05-12 | Stop reason: SDUPTHER

## 2021-12-15 ASSESSMENT — ROUTINE ASSESSMENT OF PATIENT INDEX DATA (RAPID3)
MDHAQ FUNCTION SCORE: 1.2
TOTAL RAPID3 SCORE: 5.17
PAIN SCORE: 5
PSYCHOLOGICAL DISTRESS SCORE: 3.3
PATIENT GLOBAL ASSESSMENT SCORE: 6.5
FATIGUE SCORE: 2.2

## 2021-12-20 ENCOUNTER — TELEPHONE (OUTPATIENT)
Dept: RHEUMATOLOGY | Facility: CLINIC | Age: 41
End: 2021-12-20
Payer: MEDICAID

## 2021-12-27 ENCOUNTER — TELEPHONE (OUTPATIENT)
Dept: UROLOGY | Facility: CLINIC | Age: 41
End: 2021-12-27
Payer: MEDICAID

## 2021-12-27 ENCOUNTER — SPECIALTY PHARMACY (OUTPATIENT)
Dept: PHARMACY | Facility: CLINIC | Age: 41
End: 2021-12-27
Payer: MEDICAID

## 2022-01-20 ENCOUNTER — SPECIALTY PHARMACY (OUTPATIENT)
Dept: PHARMACY | Facility: CLINIC | Age: 42
End: 2022-01-20
Payer: MEDICAID

## 2022-01-20 NOTE — TELEPHONE ENCOUNTER
Specialty Pharmacy - Refill Coordination    Specialty Medication Orders Linked to Encounter    Flowsheet Row Most Recent Value   Medication #1 etanercept (ENBREL SURECLICK) 50 mg/mL (1 mL) (Order#[id number], Rx#[id number])          Refill Questions - Documented Responses    Flowsheet Row Most Recent Value   Patient Availability and HIPAA Verification    Does patient want to proceed with activity? Yes   HIPAA/medical authority confirmed? Yes   Relationship to patient of person spoken to? Self   Refill Screening Questions    Changes to allergies? No   Changes to medications? No   New conditions since last clinic visit? No   Unplanned office visit, urgent care, ED, or hospital admission in the last 4 weeks? Yes   How does patient/caregiver feel medication is working? Good   How many doses of your specialty medications were missed in the last 4 weeks? 0   Would patient like to speak to a pharmacist? No   When does the patient need to receive the medication? 01/26/22   Refill Delivery Questions    How will the patient receive the medication? Delivery [facility]   When does the patient need to receive the medication? 01/26/22   Shipping Address Home   Address in [facility] confirmed and updated if neccessary? Yes   Expected Copay ($) 0   Is the patient able to afford the medication copay? Yes   Payment Method zero copay   Days supply of Refill 28   Supplies needed? No supplies needed   Refill activity completed? Yes   Refill activity plan Refill scheduled   Shipment/Pickup Date: 01/25/22          Current Outpatient Medications   Medication Sig    buPROPion (WELLBUTRIN XL) 300 MG 24 hr tablet Take 300 mg by mouth once daily.     clonazePAM (KLONOPIN) 0.5 MG tablet     dextroamphetamine-amphetamine (ADDERALL XR) 20 MG 24 hr capsule Take 20 mg by mouth every morning.     etanercept (ENBREL SURECLICK) 50 mg/mL (1 mL) Inject 1 mL (50 mg total) into the skin once a week.    meloxicam (MOBIC) 15 MG tablet Take 1 tablet (15  mg total) by mouth once daily.    metoprolol succinate (TOPROL-XL) 25 MG 24 hr tablet TAKE ONE TABLET (25 MG TOTAL) BY MOUTH ONCE DAILY    predniSONE (DELTASONE) 5 MG tablet TAKE ONE TABLET (5MG) BY MOUTH ONCE DAILY    ramelteon (ROZEREM) 8 mg tablet Take 1 tablet (8 mg total) by mouth nightly as needed for Insomnia. (Patient not taking: Reported on 12/15/2021)    sertraline (ZOLOFT) 100 MG tablet Take 100 mg by mouth once daily.    tamsulosin (FLOMAX) 0.4 mg Cap Take 1 capsule (0.4 mg total) by mouth once daily. for 7 days    tiZANidine (ZANAFLEX) 4 MG tablet TAKE ONE TABLET (4 MG TOTAL) BY MOUTH TWO TIMES A DAY AS NEEDED   Last reviewed on 12/20/2021 12:37 PM by Fabian Prado RN    Review of patient's allergies indicates:   Allergen Reactions    Gabapentin Other (See Comments)     Increased pain level    Lithium analogues Other (See Comments)     Suicidal thoughts    Nortriptyline Other (See Comments)     Paradoxical effect: sleepless, anxious and increased pain levels    Topamax [topiramate] Other (See Comments)     Lost vision    Sudafed [pseudoephedrine hcl]     Pseudoephedrine Anxiety    Last reviewed on  12/20/2021 1:24 PM by Niya Ann      Tasks added this encounter   No tasks added.   Tasks due within next 3 months   1/20/2022 - Refill Call (Auto Added)     Ru Alvarez [facility] - Specialty Pharm[facility]  [address] [place] [id number]  Phone: [phone]  Fax: [phone]

## 2022-02-16 ENCOUNTER — SPECIALTY PHARMACY (OUTPATIENT)
Dept: PHARMACY | Facility: CLINIC | Age: 42
End: 2022-02-16
Payer: MEDICAID

## 2022-02-16 NOTE — TELEPHONE ENCOUNTER
Specialty Pharmacy - Refill Coordination    Specialty Medication Orders Linked to Encounter    Flowsheet Row Most Recent Value   Medication #1 etanercept (ENBREL SURECLICK) 50 mg/mL (1 mL) (Order#[id number], Rx#[id number])          Refill Questions - Documented Responses    Flowsheet Row Most Recent Value   Patient Availability and HIPAA Verification    Does patient want to proceed with activity? Yes   HIPAA/medical authority confirmed? Yes   Relationship to patient of person spoken to? Self   Refill Screening Questions    Changes to allergies? No   Changes to medications? No   New conditions since last clinic visit? No   Unplanned office visit, urgent care, ED, or hospital admission in the last 4 weeks? No   How does patient/caregiver feel medication is working? Good   Financial problems or insurance changes? No   How many doses of your specialty medications were missed in the last 4 weeks? 0   Would patient like to speak to a pharmacist? No   When does the patient need to receive the medication? 02/23/22   Refill Delivery Questions    How will the patient receive the medication? Delivery Loida   When does the patient need to receive the medication? 02/23/22   Shipping Address Home   Address in [facility] confirmed and updated if neccessary? Yes   Expected Copay ($) 0   Is the patient able to afford the medication copay? Yes   Payment Method zero copay   Days supply of Refill 28   Supplies needed? No supplies needed   Refill activity completed? Yes   Refill activity plan Refill scheduled   Shipment/Pickup Date: 02/18/22          Current Outpatient Medications   Medication Sig    buPROPion (WELLBUTRIN XL) 300 MG 24 hr tablet Take 300 mg by mouth once daily.     clonazePAM (KLONOPIN) 0.5 MG tablet     dextroamphetamine-amphetamine (ADDERALL XR) 20 MG 24 hr capsule Take 20 mg by mouth every morning.     etanercept (ENBREL SURECLICK) 50 mg/mL (1 mL) Inject 1 mL (50 mg total) into the skin once a week.     meloxicam (MOBIC) 15 MG tablet Take 1 tablet (15 mg total) by mouth once daily.    metoprolol succinate (TOPROL-XL) 25 MG 24 hr tablet TAKE ONE TABLET (25 MG TOTAL) BY MOUTH ONCE DAILY    predniSONE (DELTASONE) 5 MG tablet TAKE ONE TABLET (5MG) BY MOUTH ONCE DAILY    ramelteon (ROZEREM) 8 mg tablet Take 1 tablet (8 mg total) by mouth nightly as needed for Insomnia. (Patient not taking: Reported on 12/15/2021)    sertraline (ZOLOFT) 100 MG tablet Take 100 mg by mouth once daily.    tamsulosin (FLOMAX) 0.4 mg Cap Take 1 capsule (0.4 mg total) by mouth once daily. for 7 days    tiZANidine (ZANAFLEX) 4 MG tablet TAKE ONE TABLET (4 MG TOTAL) BY MOUTH TWO TIMES A DAY AS NEEDED   Last reviewed on 12/20/2021 12:37 PM by Fabian Prado RN    Review of patient's allergies indicates:   Allergen Reactions    Gabapentin Other (See Comments)     Increased pain level    Lithium analogues Other (See Comments)     Suicidal thoughts    Nortriptyline Other (See Comments)     Paradoxical effect: sleepless, anxious and increased pain levels    Topamax [topiramate] Other (See Comments)     Lost vision    Sudafed [pseudoephedrine hcl]     Pseudoephedrine Anxiety    Last reviewed on  12/20/2021 1:24 PM by Niya Ann      Tasks added this encounter   3/16/2022 - Refill Call (Auto Added)   Tasks due within next 3 months   No tasks due.     Aileen Alvarez [facility] - [facility]  [address] [place] [id number]  Phone: [phone]  Fax: [phone]

## 2022-03-10 ENCOUNTER — PATIENT MESSAGE (OUTPATIENT)
Dept: RHEUMATOLOGY | Facility: CLINIC | Age: 42
End: 2022-03-10
Payer: MEDICAID

## 2022-03-16 ENCOUNTER — PATIENT MESSAGE (OUTPATIENT)
Dept: PHARMACY | Facility: CLINIC | Age: 42
End: 2022-03-16
Payer: MEDICAID

## 2022-03-21 ENCOUNTER — PATIENT MESSAGE (OUTPATIENT)
Dept: PHARMACY | Facility: CLINIC | Age: 42
End: 2022-03-21
Payer: MEDICAID

## 2022-03-24 ENCOUNTER — SPECIALTY PHARMACY (OUTPATIENT)
Dept: PHARMACY | Facility: CLINIC | Age: 42
End: 2022-03-24
Payer: MEDICAID

## 2022-03-24 NOTE — TELEPHONE ENCOUNTER
Specialty Pharmacy - Refill Coordination    Specialty Medication Orders Linked to Encounter    Flowsheet Row Most Recent Value   Medication #1 etanercept (ENBREL SURECLICK) 50 mg/mL (1 mL) (Order#[id number], Rx#[id number])          Refill Questions - Documented Responses    Flowsheet Row Most Recent Value   Patient Availability and HIPAA Verification    Does patient want to proceed with activity? Yes   HIPAA/medical authority confirmed? Yes   Relationship to patient of person spoken to? Self   Refill Screening Questions    Changes to allergies? No   Changes to medications? No   New conditions since last clinic visit? No   Unplanned office visit, urgent care, ED, or hospital admission in the last 4 weeks? No   How does patient/caregiver feel medication is working? Good   Financial problems or insurance changes? No   How many doses of your specialty medications were missed in the last 4 weeks? 0   Would patient like to speak to a pharmacist? No   When does the patient need to receive the medication? 03/29/22   Refill Delivery Questions    How will the patient receive the medication? Delivery Loida   When does the patient need to receive the medication? 03/29/22   Shipping Address Home   Address in [facility] confirmed and updated if neccessary? Yes   Expected Copay ($) 0   Is the patient able to afford the medication copay? Yes   Payment Method zero copay   Days supply of Refill 28   Supplies needed? No supplies needed   Refill activity completed? Yes   Refill activity plan Refill scheduled   Shipment/Pickup Date: 03/28/22          Current Outpatient Medications   Medication Sig    buPROPion (WELLBUTRIN XL) 300 MG 24 hr tablet Take 300 mg by mouth once daily.     clonazePAM (KLONOPIN) 0.5 MG tablet     dextroamphetamine-amphetamine (ADDERALL XR) 20 MG 24 hr capsule Take 20 mg by mouth every morning.     etanercept (ENBREL SURECLICK) 50 mg/mL (1 mL) Inject 1 mL (50 mg total) into the skin once a week.     meloxicam (MOBIC) 15 MG tablet Take 1 tablet (15 mg total) by mouth once daily.    metoprolol succinate (TOPROL-XL) 25 MG 24 hr tablet TAKE ONE TABLET (25 MG TOTAL) BY MOUTH ONCE DAILY    predniSONE (DELTASONE) 5 MG tablet TAKE ONE TABLET (5MG) BY MOUTH ONCE DAILY    ramelteon (ROZEREM) 8 mg tablet Take 1 tablet (8 mg total) by mouth nightly as needed for Insomnia. (Patient not taking: Reported on 12/15/2021)    sertraline (ZOLOFT) 100 MG tablet Take 100 mg by mouth once daily.    tamsulosin (FLOMAX) 0.4 mg Cap Take 1 capsule (0.4 mg total) by mouth once daily. for 7 days    tiZANidine (ZANAFLEX) 4 MG tablet TAKE ONE TABLET (4 MG TOTAL) BY MOUTH TWO TIMES A DAY AS NEEDED   Last reviewed on 12/20/2021 12:37 PM by Fabian Prado RN    Review of patient's allergies indicates:   Allergen Reactions    Gabapentin Other (See Comments)     Increased pain level    Lithium analogues Other (See Comments)     Suicidal thoughts    Nortriptyline Other (See Comments)     Paradoxical effect: sleepless, anxious and increased pain levels    Topamax [topiramate] Other (See Comments)     Lost vision    Sudafed [pseudoephedrine hcl]     Pseudoephedrine Anxiety    Last reviewed on  12/20/2021 1:24 PM by Niya Ann      Tasks added this encounter   4/19/2022 - Refill Call (Auto Added)   Tasks due within next 3 months   No tasks due.     Aileen Alvarez [facility] - Specialty Pharmacy  [address] [place] [id number]  Phone: [phone]  Fax: [phone]

## 2022-03-30 DIAGNOSIS — R00.0 TACHYCARDIA: ICD-10-CM

## 2022-03-30 RX ORDER — METOPROLOL SUCCINATE 25 MG/1
TABLET, EXTENDED RELEASE ORAL
Qty: 90 TABLET | Refills: 0 | Status: SHIPPED | OUTPATIENT
Start: 2022-03-30 | End: 2022-08-16

## 2022-03-30 NOTE — TELEPHONE ENCOUNTER
No new care gaps identified.  Powered by [employer] by Pager. Reference number: [id number].   3/30/2022 2:09:55 AM CDT

## 2022-03-30 NOTE — TELEPHONE ENCOUNTER
This Rx Request does not qualify for assessment with the [facility]   Please review protocol details and the Care Due Message for extra clinical information    Reasons Rx Request may be deferred:  Patient has been seen in the ED/Hospital since the last PCP visit    Note composed:6:49 AM 03/30/2022

## 2022-03-31 DIAGNOSIS — R00.0 TACHYCARDIA: ICD-10-CM

## 2022-03-31 NOTE — TELEPHONE ENCOUNTER
No new care gaps identified.  Powered by [employer] by [employer]. Reference number: [id number].   3/31/2022 2:09:56 AM CDT

## 2022-04-01 RX ORDER — METOPROLOL SUCCINATE 25 MG/1
TABLET, EXTENDED RELEASE ORAL
Qty: 90 TABLET | Refills: 3 | OUTPATIENT
Start: 2022-04-01

## 2022-04-18 ENCOUNTER — PATIENT MESSAGE (OUTPATIENT)
Dept: ADMINISTRATIVE | Facility: OTHER | Age: 42
End: 2022-04-18
Payer: MEDICAID

## 2022-04-20 ENCOUNTER — SPECIALTY PHARMACY (OUTPATIENT)
Dept: PHARMACY | Facility: CLINIC | Age: 42
End: 2022-04-20
Payer: MEDICAID

## 2022-04-20 DIAGNOSIS — Z12.31 OTHER SCREENING MAMMOGRAM: ICD-10-CM

## 2022-04-20 DIAGNOSIS — M06.00 SERONEGATIVE RHEUMATOID ARTHRITIS: ICD-10-CM

## 2022-04-25 ENCOUNTER — PATIENT MESSAGE (OUTPATIENT)
Dept: ADMINISTRATIVE | Facility: HOSPITAL | Age: 42
End: 2022-04-25
Payer: MEDICAID

## 2022-04-25 RX ORDER — ETANERCEPT 50 MG/ML
50 SOLUTION SUBCUTANEOUS WEEKLY
Qty: 4 ML | Refills: 11 | Status: SHIPPED | OUTPATIENT
Start: 2022-04-25 | End: 2023-03-29 | Stop reason: SDUPTHER

## 2022-04-25 NOTE — TELEPHONE ENCOUNTER
Specialty Pharmacy - Refill Coordination    Specialty Medication Orders Linked to Encounter    Flowsheet Row Most Recent Value   Medication #1 etanercept (ENBREL SURECLICK) 50 mg/mL (1 mL) (Order#[id number], Rx#[id number])        Discussed how to handle late doses. Patient will take Enbrel tomorrow and restart Sunday dosing. No further questions or concerns.     Refill Questions - Documented Responses    Flowsheet Row Most Recent Value   Patient Availability and HIPAA Verification    Does patient want to proceed with activity? Yes   HIPAA/medical authority confirmed? Yes   Relationship to patient of person spoken to? Self   Refill Screening Questions    Changes to allergies? No   Changes to medications? No   New conditions since last clinic visit? No   Unplanned office visit, urgent care, ED, or hospital admission in the last 4 weeks? No   How does patient/caregiver feel medication is working? Very good   Financial problems or insurance changes? No   How many doses of your specialty medications were missed in the last 4 weeks? 1   Would patient like to speak to a pharmacist? No   When does the patient need to receive the medication? 04/24/22   Refill Delivery Questions    How will the patient receive the medication? Delivery Loida   When does the patient need to receive the medication? 04/24/22   Shipping Address Home   Address in [facility] confirmed and updated if neccessary? Yes   Expected Copay ($) 0   Is the patient able to afford the medication copay? Yes   Payment Method zero copay   Days supply of Refill 28   Supplies needed? No supplies needed   Refill activity completed? Yes   Refill activity plan Refill scheduled   Shipment/Pickup Date: 04/26/22          Current Outpatient Medications   Medication Sig    buPROPion (WELLBUTRIN XL) 300 MG 24 hr tablet Take 300 mg by mouth once daily.     clonazePAM (KLONOPIN) 0.5 MG tablet     dextroamphetamine-amphetamine (ADDERALL XR) 20 MG 24 hr capsule Take 20  mg by mouth every morning.     etanercept (ENBREL SURECLICK) 50 mg/mL (1 mL) Inject 1 mL (50 mg total) into the skin once a week.    meloxicam (MOBIC) 15 MG tablet Take 1 tablet (15 mg total) by mouth once daily.    metoprolol succinate (TOPROL-XL) 25 MG 24 hr tablet TAKE ONE TABLET (25MG) BY MOUTH ONCE DAILY    predniSONE (DELTASONE) 5 MG tablet TAKE ONE TABLET (5MG) BY MOUTH ONCE DAILY    ramelteon (ROZEREM) 8 mg tablet Take 1 tablet (8 mg total) by mouth nightly as needed for Insomnia. (Patient not taking: Reported on 12/15/2021)    sertraline (ZOLOFT) 100 MG tablet Take 100 mg by mouth once daily.    tamsulosin (FLOMAX) 0.4 mg Cap Take 1 capsule (0.4 mg total) by mouth once daily. for 7 days    tiZANidine (ZANAFLEX) 4 MG tablet TAKE ONE TABLET (4 MG TOTAL) BY MOUTH TWO TIMES A DAY AS NEEDED   Last reviewed on 12/20/2021 12:37 PM by Fabian Prado RN    Review of patient's allergies indicates:   Allergen Reactions    Gabapentin Other (See Comments)     Increased pain level    Lithium analogues Other (See Comments)     Suicidal thoughts    Nortriptyline Other (See Comments)     Paradoxical effect: sleepless, anxious and increased pain levels    Topamax [topiramate] Other (See Comments)     Lost vision    Sudafed [pseudoephedrine hcl]     Pseudoephedrine Anxiety    Last reviewed on  12/20/2021 1:24 PM by Niya Ann      Tasks added this encounter   5/13/2022 - Refill Call (Auto Added)   Tasks due within next 3 months   No tasks due.     Rai Leong, PharmD  [facility] - [facility]  [address] [place] [id number]  Phone: [phone]  Fax: [phone]

## 2022-05-02 ENCOUNTER — LAB VISIT (OUTPATIENT)
Dept: LAB | Facility: HOSPITAL | Age: 42
End: 2022-05-02
Attending: INTERNAL MEDICINE
Payer: MEDICAID

## 2022-05-02 DIAGNOSIS — D84.9 IMMUNOSUPPRESSION: ICD-10-CM

## 2022-05-02 DIAGNOSIS — M06.00 SERONEGATIVE RHEUMATOID ARTHRITIS: ICD-10-CM

## 2022-05-02 DIAGNOSIS — M06.4 INFLAMMATORY POLYARTHRITIS: ICD-10-CM

## 2022-05-02 LAB
ALBUMIN SERPL BCP-MCNC: 4.3 G/DL (ref 3.5–5.2)
ALP SERPL-CCNC: 71 U/L (ref 55–135)
ALT SERPL W/O P-5'-P-CCNC: 13 U/L (ref 10–44)
ANION GAP SERPL CALC-SCNC: 11 MMOL/L (ref 8–16)
AST SERPL-CCNC: 20 U/L (ref 10–40)
BASOPHILS # BLD AUTO: 0.08 K/UL (ref 0–0.2)
BASOPHILS NFR BLD: 0.8 % (ref 0–1.9)
BILIRUB SERPL-MCNC: 0.4 MG/DL (ref 0.1–1)
BUN SERPL-MCNC: 10 MG/DL (ref 6–20)
CALCIUM SERPL-MCNC: 9.5 MG/DL (ref 8.7–10.5)
CHLORIDE SERPL-SCNC: 101 MMOL/L (ref 95–110)
CO2 SERPL-SCNC: 26 MMOL/L (ref 23–29)
CREAT SERPL-MCNC: 1 MG/DL (ref 0.5–1.4)
CRP SERPL-MCNC: 3.3 MG/L (ref 0–8.2)
DIFFERENTIAL METHOD: ABNORMAL
EOSINOPHIL # BLD AUTO: 0 K/UL (ref 0–0.5)
EOSINOPHIL NFR BLD: 0.4 % (ref 0–8)
ERYTHROCYTE [DISTWIDTH] IN BLOOD BY AUTOMATED COUNT: 12.1 % (ref 11.5–14.5)
ERYTHROCYTE [SEDIMENTATION RATE] IN BLOOD BY WESTERGREN METHOD: 7 MM/HR (ref 0–36)
EST. GFR  (AFRICAN AMERICAN): >60 ML/MIN/1.73 M^2
EST. GFR  (NON AFRICAN AMERICAN): >60 ML/MIN/1.73 M^2
GLUCOSE SERPL-MCNC: 87 MG/DL (ref 70–110)
HCT VFR BLD AUTO: 42.3 % (ref 37–48.5)
HGB BLD-MCNC: 14.2 G/DL (ref 12–16)
IMM GRANULOCYTES # BLD AUTO: 0.04 K/UL (ref 0–0.04)
IMM GRANULOCYTES NFR BLD AUTO: 0.4 % (ref 0–0.5)
LYMPHOCYTES # BLD AUTO: 1.7 K/UL (ref 1–4.8)
LYMPHOCYTES NFR BLD: 17.5 % (ref 18–48)
MCH RBC QN AUTO: 30 PG (ref 27–31)
MCHC RBC AUTO-ENTMCNC: 33.6 G/DL (ref 32–36)
MCV RBC AUTO: 89 FL (ref 82–98)
MONOCYTES # BLD AUTO: 0.4 K/UL (ref 0.3–1)
MONOCYTES NFR BLD: 4.4 % (ref 4–15)
NEUTROPHILS # BLD AUTO: 7.5 K/UL (ref 1.8–7.7)
NEUTROPHILS NFR BLD: 76.5 % (ref 38–73)
NRBC BLD-RTO: 0 /100 WBC
PLATELET # BLD AUTO: 263 K/UL (ref 150–450)
PMV BLD AUTO: 12.8 FL (ref 9.2–12.9)
POTASSIUM SERPL-SCNC: 4.2 MMOL/L (ref 3.5–5.1)
PROT SERPL-MCNC: 7.7 G/DL (ref 6–8.4)
RBC # BLD AUTO: 4.73 M/UL (ref 4–5.4)
SODIUM SERPL-SCNC: 138 MMOL/L (ref 136–145)
WBC # BLD AUTO: 9.86 K/UL (ref 3.9–12.7)

## 2022-05-02 PROCEDURE — 80053 COMPREHEN METABOLIC PANEL: CPT | Performed by: INTERNAL MEDICINE

## 2022-05-02 PROCEDURE — 85652 RBC SED RATE AUTOMATED: CPT | Performed by: INTERNAL MEDICINE

## 2022-05-02 PROCEDURE — 86140 C-REACTIVE PROTEIN: CPT | Performed by: INTERNAL MEDICINE

## 2022-05-02 PROCEDURE — 85025 COMPLETE CBC W/AUTO DIFF WBC: CPT | Performed by: INTERNAL MEDICINE

## 2022-05-02 PROCEDURE — 36415 COLL VENOUS BLD VENIPUNCTURE: CPT | Mod: PO | Performed by: INTERNAL MEDICINE

## 2022-05-12 ENCOUNTER — OFFICE VISIT (OUTPATIENT)
Dept: RHEUMATOLOGY | Facility: CLINIC | Age: 42
End: 2022-05-12
Payer: MEDICAID

## 2022-05-12 VITALS
DIASTOLIC BLOOD PRESSURE: 65 MMHG | HEART RATE: 79 BPM | SYSTOLIC BLOOD PRESSURE: 139 MMHG | WEIGHT: 156.88 LBS | BODY MASS INDEX: 28.87 KG/M2 | HEIGHT: 62 IN

## 2022-05-12 DIAGNOSIS — M35.9 UNDIFFERENTIATED CONNECTIVE TISSUE DISEASE: ICD-10-CM

## 2022-05-12 DIAGNOSIS — D84.9 IMMUNOSUPPRESSION: ICD-10-CM

## 2022-05-12 DIAGNOSIS — G93.32 CHRONIC FATIGUE SYNDROME: ICD-10-CM

## 2022-05-12 DIAGNOSIS — M79.7 FIBROMYALGIA: ICD-10-CM

## 2022-05-12 DIAGNOSIS — M06.00 SERONEGATIVE RHEUMATOID ARTHRITIS: Primary | ICD-10-CM

## 2022-05-12 PROCEDURE — 99213 OFFICE O/P EST LOW 20 MIN: CPT | Mod: PBBFAC,PN | Performed by: INTERNAL MEDICINE

## 2022-05-12 PROCEDURE — 1159F MED LIST DOCD IN RCRD: CPT | Mod: CPTII,,, | Performed by: INTERNAL MEDICINE

## 2022-05-12 PROCEDURE — 3075F SYST BP GE 130 - 139MM HG: CPT | Mod: CPTII,,, | Performed by: INTERNAL MEDICINE

## 2022-05-12 PROCEDURE — 99999 PR PBB SHADOW E&M-EST. PATIENT-LVL III: CPT | Mod: PBBFAC,,, | Performed by: INTERNAL MEDICINE

## 2022-05-12 PROCEDURE — 1160F PR REVIEW ALL MEDS BY PRESCRIBER/CLIN PHARMACIST DOCUMENTED: ICD-10-PCS | Mod: CPTII,,, | Performed by: INTERNAL MEDICINE

## 2022-05-12 PROCEDURE — 3078F PR MOST RECENT DIASTOLIC BLOOD PRESSURE < 80 MM HG: ICD-10-PCS | Mod: CPTII,,, | Performed by: INTERNAL MEDICINE

## 2022-05-12 PROCEDURE — 3075F PR MOST RECENT SYSTOLIC BLOOD PRESS GE 130-139MM HG: ICD-10-PCS | Mod: CPTII,,, | Performed by: INTERNAL MEDICINE

## 2022-05-12 PROCEDURE — 1159F PR MEDICATION LIST DOCUMENTED IN MEDICAL RECORD: ICD-10-PCS | Mod: CPTII,,, | Performed by: INTERNAL MEDICINE

## 2022-05-12 PROCEDURE — 99214 PR OFFICE/OUTPT VISIT, EST, LEVL IV, 30-39 MIN: ICD-10-PCS | Mod: S$PBB,,, | Performed by: INTERNAL MEDICINE

## 2022-05-12 PROCEDURE — 1160F RVW MEDS BY RX/DR IN RCRD: CPT | Mod: CPTII,,, | Performed by: INTERNAL MEDICINE

## 2022-05-12 PROCEDURE — 3078F DIAST BP <80 MM HG: CPT | Mod: CPTII,,, | Performed by: INTERNAL MEDICINE

## 2022-05-12 PROCEDURE — 3008F BODY MASS INDEX DOCD: CPT | Mod: CPTII,,, | Performed by: INTERNAL MEDICINE

## 2022-05-12 PROCEDURE — 99214 OFFICE O/P EST MOD 30 MIN: CPT | Mod: S$PBB,,, | Performed by: INTERNAL MEDICINE

## 2022-05-12 PROCEDURE — 3008F PR BODY MASS INDEX (BMI) DOCUMENTED: ICD-10-PCS | Mod: CPTII,,, | Performed by: INTERNAL MEDICINE

## 2022-05-12 PROCEDURE — 99999 PR PBB SHADOW E&M-EST. PATIENT-LVL III: ICD-10-PCS | Mod: PBBFAC,,, | Performed by: INTERNAL MEDICINE

## 2022-05-12 RX ORDER — MELOXICAM 15 MG/1
15 TABLET ORAL DAILY
Qty: 30 TABLET | Refills: 6 | Status: SHIPPED | OUTPATIENT
Start: 2022-05-12 | End: 2022-11-01 | Stop reason: SDUPTHER

## 2022-05-12 RX ORDER — PREDNISONE 5 MG/1
TABLET ORAL
Qty: 90 TABLET | Refills: 3 | Status: SHIPPED | OUTPATIENT
Start: 2022-05-12 | End: 2023-09-06 | Stop reason: SDUPTHER

## 2022-05-12 RX ORDER — TIZANIDINE 4 MG/1
TABLET ORAL
Qty: 60 TABLET | Refills: 6 | Status: SHIPPED | OUTPATIENT
Start: 2022-05-12 | End: 2023-06-05

## 2022-05-12 ASSESSMENT — ROUTINE ASSESSMENT OF PATIENT INDEX DATA (RAPID3)
MDHAQ FUNCTION SCORE: 1.6
PAIN SCORE: 7
PSYCHOLOGICAL DISTRESS SCORE: 2.2
PATIENT GLOBAL ASSESSMENT SCORE: 4
FATIGUE SCORE: 3.3
TOTAL RAPID3 SCORE: 5.44

## 2022-05-12 NOTE — PROGRESS NOTES
Subjective:          Chief Complaint: Dorothy Jesus is a 42 y.o. female who had concerns including Disease Management.    HPI:    Seronegative Rheumatoid/ Undifferentiated connective tissue disease   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 No cardiac manifestations.     Positive LENA , elevation in her CRP normal sedimentation rate but did have benefit on prednisone.    trial of prednisone did show significant improvement in her overall symptomst   When she comes off prednisone everything seems to regress at her baseline. No rashes, some pain with respirations, no fevers but subjectively, fatigue in sun no rash.     Restarted Humira 6/2020-3/2021 stopped.    TB/Hepatitis updated 2/2021 negative.     Enbrel (4/2021)  Prednisone still 5mg daily PRN using about 50% of the months.     Tizanidine helps with back and upper trapezius.   Affected joints: feet, knees and hands.         She failed HCQ with no response  MTX made her ill/nausea.  SSZ 2gm no benefit.    tylenol as not helpful  Ibuprofen PRN more for menstrual cramps never found helpful for joints.         FMS:     She has increased her activity level trying to eat healthier.  In the past she states she was getting some relief of myalgias and arthralgias on ketoprofen.  He completed the vitamin D 12 weeks her psychiatrist has recently adjusted her medications slightly and this seems to be helping a great deal.  Interestingly she noted with the adjustment in her ADHD meds her myalgias have improved.Patient is a 36 y/o female referred for +LENA  1:80 homogeneous with repeat serologies + 1:320 negative LENA profile.  with myalgias, brain fog. Present for few years worse in past 2 years, affecting daily living/function. Patient did suffer from worsening of her symptoms after trying gabapentin, she did increase dose as we discussed but this only exacerbated her complaints. She did have worseing depression through the winter. Recent job did not work out for  "her, which was frustrating.  did recently get full time job.    Exacerbates with menstrual cycles. Patient has a non-restorative sleep pattern, no snoring. She has had sleep study-[place] negative for narcoplepsy. Patient was given Provigil exacerbated anxiety.    Recently with cognitive impairment, ++migraines (unable to see Dr. Yao at this time, cannot drive to [place]), extreme fatigue, dizzyness. She is more forgetful that typical.   Trying cataflam PRN migraine.      Patient was on lamictal for nearly 8 years d/c'd for suspected myalgias. Recently (few weeks ago) trialed lithium did not tolerate. Zyprexa, Geodon, topamax and risperdol. Wellbutrin has been the best medication for her as she continues to feel "human". She is having more myalgias, and some arthralgias some good days some bad days. She cannot see a pattern of morning stiffness.     Sister with UC  Mom recent dx RA.   Component      Latest Ref Rng & Units 4/13/2017 12/10/2015   Anti Sm Antibody      0.00 - 19.99 EU 0.35 0.43   Anti-Sm Interpretation      Negative Negative Negative   Anti-SSA Antibody      0.00 - 19.99 EU 0.97 1.62   Anti-SSA Interpretation      Negative Negative Negative   Anti-SSB Antibody      0.00 - 19.99 EU 0.35 0.00   Anti-SSB Interpretation      Negative Negative Negative   ds DNA Ab      Negative 1:10 Negative 1:10 Negative 1:10   Anti Sm/RNP Antibody      0.00 - 19.99 EU 0.79 0.86   Anti-Sm/RNP Interpretation      Negative Negative Negative   Complement (C-3)      50 - 180 mg/dL  108   Complement (C-4)      11 - 44 mg/dL  27   CPK      20 - 180 U/L  50   LENA Screen      Negative <1:160 Positive (A)    LENA HEP-2 Titer       Positive 1:320 Homogeneous        REVIEW OF SYSTEMS:    Review of Systems   Constitutional: Positive for malaise/fatigue. Negative for fever and weight loss.   HENT: Negative for sore throat.    Eyes: Negative for double vision, photophobia and redness.   Respiratory: Negative for cough, shortness " of breath and wheezing.    Cardiovascular: Negative for chest pain, palpitations and orthopnea.   Gastrointestinal: Negative for abdominal pain, constipation and diarrhea.   Genitourinary: Negative for dysuria, hematuria and urgency.   Musculoskeletal: Positive for joint pain and neck pain. Negative for back pain and myalgias.   Skin: Negative for rash.   Neurological: Negative for dizziness, tingling, focal weakness and headaches.   Endo/Heme/Allergies: Does not bruise/bleed easily.   Psychiatric/Behavioral: Negative for depression, hallucinations and suicidal ideas.               Objective:            Past Medical History:   Diagnosis Date    Anxiety     Bartholin's cyst     Bipolar 2 disorder     Chronic fatigue     Osteopenia      Family History   Problem Relation Age of Onset    Breast cancer Maternal Grandmother      Social History     Tobacco Use    Smoking status: Never Smoker    Smokeless tobacco: Never Used   Substance Use Topics    Alcohol use: No     Comment: rare    Drug use: No         Current Outpatient Medications on File Prior to Visit   Medication Sig Dispense Refill    buPROPion (WELLBUTRIN XL) 300 MG 24 hr tablet Take 300 mg by mouth once daily.       clonazePAM (KLONOPIN) 0.5 MG tablet       dextroamphetamine-amphetamine (ADDERALL XR) 20 MG 24 hr capsule Take 20 mg by mouth every morning.       etanercept (ENBREL SURECLICK) 50 mg/mL (1 mL) Inject 1 mL (50 mg total) into the skin once a week. 4 mL 11    meloxicam (MOBIC) 15 MG tablet Take 1 tablet (15 mg total) by mouth once daily. 30 tablet 6    metoprolol succinate (TOPROL-XL) 25 MG 24 hr tablet TAKE ONE TABLET (25MG) BY MOUTH ONCE DAILY 90 tablet 0    predniSONE (DELTASONE) 5 MG tablet TAKE ONE TABLET (5MG) BY MOUTH ONCE DAILY 90 tablet 3    sertraline (ZOLOFT) 100 MG tablet Take 100 mg by mouth once daily.      tiZANidine (ZANAFLEX) 4 MG tablet TAKE ONE TABLET (4 MG TOTAL) BY MOUTH TWO TIMES A DAY AS NEEDED 60 tablet 6     ramelteon (ROZEREM) 8 mg tablet Take 1 tablet (8 mg total) by mouth nightly as needed for Insomnia. (Patient not taking: No sig reported) 30 tablet 2    tamsulosin (FLOMAX) 0.4 mg Cap Take 1 capsule (0.4 mg total) by mouth once daily. for 7 days 7 capsule 0     No current facility-administered medications on file prior to visit.       Vitals:    05/12/22 1032   BP: 139/65   Pulse: 79       Physical Exam:    Physical Exam  Constitutional:       Appearance: She is well-developed.   HENT:      Head: Normocephalic and atraumatic.   Eyes:      General: Lids are normal.      Pupils: Pupils are equal, round, and reactive to light.   Cardiovascular:      Rate and Rhythm: Normal rate and regular rhythm.      Heart sounds: Normal heart sounds.   Pulmonary:      Effort: Pulmonary effort is normal.      Breath sounds: Normal breath sounds.   Musculoskeletal:      Right shoulder: Tenderness present. No swelling. Normal range of motion.      Left shoulder: Tenderness present. No swelling. Normal range of motion.      Right elbow: No swelling. Normal range of motion. No tenderness.      Left elbow: No swelling. Normal range of motion. No tenderness.      Right wrist: Tenderness present. No swelling. Normal range of motion.      Left wrist: Tenderness present. No swelling. Normal range of motion.      Right hand: Tenderness present. No swelling. Normal range of motion.      Left hand: Tenderness present. No swelling. Normal range of motion.      Cervical back: Normal range of motion.      Right knee: No swelling. Normal range of motion. No tenderness.      Left knee: No swelling. Normal range of motion. No tenderness.      Right foot: Normal range of motion. No swelling or tenderness.      Left foot: Normal range of motion. No swelling or tenderness.      Comments: 18 tender points examined in nine pairs: Posterior occiput, bilateral trapezius, bilateral supraspinatus, bilateral gluteal muscles, bilateral low cervical neck,  bilateral second rib, bilateral lateral epicondyles, bilateral greater trochanters, bilateral medial knees. 16 Of 18 points examined were positive for tenderness.      MCP and PIP tenderness with full but painful finger curl.      Skin:     General: Skin is warm and dry.   Neurological:      Mental Status: She is alert and oriented to person, place, and time.   Psychiatric:         Behavior: Behavior normal.         Thought Content: Thought content normal.               Assessment:       Encounter Diagnoses   Name Primary?    Seronegative rheumatoid arthritis Yes    Fibromyalgia     Chronic fatigue syndrome     Undifferentiated connective tissue disease     Immunosuppression           Plan:        Seronegative rheumatoid arthritis  -     meloxicam (MOBIC) 15 MG tablet; Take 1 tablet (15 mg total) by mouth once daily.  Dispense: 30 tablet; Refill: 6  -     predniSONE (DELTASONE) 5 MG tablet; TAKE ONE TABLET (5MG) BY MOUTH ONCE DAILY  Dispense: 90 tablet; Refill: 3  -     tiZANidine (ZANAFLEX) 4 MG tablet; TAKE ONE TABLET (4 MG TOTAL) BY MOUTH TWO TIMES A DAY AS NEEDED  Dispense: 60 tablet; Refill: 6  -     CBC Auto Differential; Standing; Expected date: 05/12/2022  -     Comprehensive Metabolic Panel; Standing; Expected date: 05/12/2022  -     Sedimentation rate; Standing; Expected date: 05/12/2022  -     C-Reactive Protein; Standing; Expected date: 05/12/2022    Fibromyalgia  -     tiZANidine (ZANAFLEX) 4 MG tablet; TAKE ONE TABLET (4 MG TOTAL) BY MOUTH TWO TIMES A DAY AS NEEDED  Dispense: 60 tablet; Refill: 6    Chronic fatigue syndrome    Undifferentiated connective tissue disease    Immunosuppression  -     CBC Auto Differential; Standing; Expected date: 05/12/2022  -     Comprehensive Metabolic Panel; Standing; Expected date: 05/12/2022  -     Sedimentation rate; Standing; Expected date: 05/12/2022  -     C-Reactive Protein; Standing; Expected date: 05/12/2022      Continue Prednisone 5mg now using PRN  approx 3/7 days out of the week  Continue w/  Enbrel 50mg sureclick. (start 4/2021)  Continue with Prednisone 5mg PRN  Continue with Meloxicam    No response with HCQ>   Intolerant MTX  SSZ tolerating but no significant difference when we held it for few weeks.      Continue Tizanidine    Holding Mobic     No follow-ups on file.           30min consultation with greater than 50% spent in counseling, chart review and coordination of care. All questions answered.

## 2022-05-19 ENCOUNTER — SPECIALTY PHARMACY (OUTPATIENT)
Dept: PHARMACY | Facility: CLINIC | Age: 42
End: 2022-05-19
Payer: MEDICAID

## 2022-05-19 NOTE — TELEPHONE ENCOUNTER
Specialty Pharmacy - Refill Coordination    Specialty Medication Orders Linked to Encounter    Flowsheet Row Most Recent Value   Medication #1 etanercept (ENBREL SURECLICK) 50 mg/mL (1 mL) (Order#[id number], Rx#[id number])          Refill Questions - Documented Responses    Flowsheet Row Most Recent Value   Patient Availability and HIPAA Verification    Does patient want to proceed with activity? Yes   HIPAA/medical authority confirmed? Yes   Relationship to patient of person spoken to? Self   Refill Screening Questions    Changes to allergies? No   Changes to medications? No   New conditions since last clinic visit? No   Unplanned office visit, urgent care, ED, or hospital admission in the last 4 weeks? No   How does patient/caregiver feel medication is working? Good   Financial problems or insurance changes? No   How many doses of your specialty medications were missed in the last 4 weeks? 0   Would patient like to speak to a pharmacist? No   When does the patient need to receive the medication? 05/22/22   Refill Delivery Questions    How will the patient receive the medication? Delivery Loida   When does the patient need to receive the medication? 05/22/22   Shipping Address Home   Address in [facility] confirmed and updated if neccessary? Yes   Expected Copay ($) 0   Is the patient able to afford the medication copay? Yes   Payment Method zero copay   Days supply of Refill 28   Supplies needed? No supplies needed   Refill activity completed? Yes   Refill activity plan Refill scheduled   Shipment/Pickup Date: 05/20/22          Current Outpatient Medications   Medication Sig    buPROPion (WELLBUTRIN XL) 300 MG 24 hr tablet Take 300 mg by mouth once daily.     clonazePAM (KLONOPIN) 0.5 MG tablet     dextroamphetamine-amphetamine (ADDERALL XR) 20 MG 24 hr capsule Take 20 mg by mouth every morning.     etanercept (ENBREL SURECLICK) 50 mg/mL (1 mL) Inject 1 mL (50 mg total) into the skin once a week.     meloxicam (MOBIC) 15 MG tablet Take 1 tablet (15 mg total) by mouth once daily.    metoprolol succinate (TOPROL-XL) 25 MG 24 hr tablet TAKE ONE TABLET (25MG) BY MOUTH ONCE DAILY    predniSONE (DELTASONE) 5 MG tablet TAKE ONE TABLET (5MG) BY MOUTH ONCE DAILY    ramelteon (ROZEREM) 8 mg tablet Take 1 tablet (8 mg total) by mouth nightly as needed for Insomnia. (Patient not taking: No sig reported)    sertraline (ZOLOFT) 100 MG tablet Take 100 mg by mouth once daily.    tamsulosin (FLOMAX) 0.4 mg Cap Take 1 capsule (0.4 mg total) by mouth once daily. for 7 days    tiZANidine (ZANAFLEX) 4 MG tablet TAKE ONE TABLET (4 MG TOTAL) BY MOUTH TWO TIMES A DAY AS NEEDED   Last reviewed on 5/12/2022 11:28 AM by Glory St, DO    Review of patient's allergies indicates:   Allergen Reactions    Gabapentin Other (See Comments)     Increased pain level    Lithium analogues Other (See Comments)     Suicidal thoughts    Nortriptyline Other (See Comments)     Paradoxical effect: sleepless, anxious and increased pain levels    Topamax [topiramate] Other (See Comments)     Lost vision    Sudafed [pseudoephedrine hcl]     Pseudoephedrine Anxiety    Last reviewed on  5/12/2022 11:28 AM by Glory St      Tasks added this encounter   6/12/2022 - Refill Call (Auto Added)   Tasks due within next 3 months   No tasks due.     Mirian Adams, Patient Care Assistant  [facility]  [address] [place] [id number]  Phone: [phone]  Fax: [phone]

## 2022-05-31 ENCOUNTER — PATIENT MESSAGE (OUTPATIENT)
Dept: ADMINISTRATIVE | Facility: HOSPITAL | Age: 42
End: 2022-05-31
Payer: MEDICAID

## 2022-06-13 ENCOUNTER — SPECIALTY PHARMACY (OUTPATIENT)
Dept: PHARMACY | Facility: CLINIC | Age: 42
End: 2022-06-13
Payer: MEDICAID

## 2022-06-13 NOTE — TELEPHONE ENCOUNTER
Specialty Pharmacy - Refill Coordination    Specialty Medication Orders Linked to Encounter    Flowsheet Row Most Recent Value   Medication #1 etanercept (ENBREL SURECLICK) 50 mg/mL (1 mL) (Order#[id number], Rx#[id number])          Refill Questions - Documented Responses    Flowsheet Row Most Recent Value   Patient Availability and HIPAA Verification    Does patient want to proceed with activity? Yes   HIPAA/medical authority confirmed? Yes   Relationship to patient of person spoken to? Self   Refill Screening Questions    Changes to allergies? No   Changes to medications? No   New conditions since last clinic visit? No   Unplanned office visit, urgent care, ED, or hospital admission in the last 4 weeks? No   How does patient/caregiver feel medication is working? Good   Financial problems or insurance changes? No   How many doses of your specialty medications were missed in the last 4 weeks? 0   Would patient like to speak to a pharmacist? No   When does the patient need to receive the medication? 06/19/22   Refill Delivery Questions    How will the patient receive the medication? Delivery Loida   When does the patient need to receive the medication? 06/19/22   Shipping Address Home   Address in [facility] confirmed and updated if neccessary? Yes   Expected Copay ($) 0   Is the patient able to afford the medication copay? Yes   Payment Method zero copay   Days supply of Refill 28   Supplies needed? No supplies needed   Refill activity completed? Yes   Refill activity plan Refill scheduled   Shipment/Pickup Date: 06/16/22          Current Outpatient Medications   Medication Sig    buPROPion (WELLBUTRIN XL) 300 MG 24 hr tablet Take 300 mg by mouth once daily.     clonazePAM (KLONOPIN) 0.5 MG tablet     dextroamphetamine-amphetamine (ADDERALL XR) 20 MG 24 hr capsule Take 20 mg by mouth every morning.     etanercept (ENBREL SURECLICK) 50 mg/mL (1 mL) Inject 1 mL (50 mg total) into the skin once a week.     meloxicam (MOBIC) 15 MG tablet Take 1 tablet (15 mg total) by mouth once daily.    metoprolol succinate (TOPROL-XL) 25 MG 24 hr tablet TAKE ONE TABLET (25MG) BY MOUTH ONCE DAILY    predniSONE (DELTASONE) 5 MG tablet TAKE ONE TABLET (5MG) BY MOUTH ONCE DAILY    ramelteon (ROZEREM) 8 mg tablet Take 1 tablet (8 mg total) by mouth nightly as needed for Insomnia. (Patient not taking: No sig reported)    sertraline (ZOLOFT) 100 MG tablet Take 100 mg by mouth once daily.    tamsulosin (FLOMAX) 0.4 mg Cap Take 1 capsule (0.4 mg total) by mouth once daily. for 7 days    tiZANidine (ZANAFLEX) 4 MG tablet TAKE ONE TABLET (4 MG TOTAL) BY MOUTH TWO TIMES A DAY AS NEEDED   Last reviewed on 5/12/2022 11:28 AM by Glory St, DO    Review of patient's allergies indicates:   Allergen Reactions    Gabapentin Other (See Comments)     Increased pain level    Lithium analogues Other (See Comments)     Suicidal thoughts    Nortriptyline Other (See Comments)     Paradoxical effect: sleepless, anxious and increased pain levels    Topamax [topiramate] Other (See Comments)     Lost vision    Sudafed [pseudoephedrine hcl]     Pseudoephedrine Anxiety    Last reviewed on  5/12/2022 11:28 AM by Glory St      Tasks added this encounter   7/10/2022 - Refill Call (Auto Added)   Tasks due within next 3 months   No tasks due.     Kelly Rodarte  [facility]  [address] [place] [address]  Phone: [phone]  Fax: [phone]

## 2022-06-20 ENCOUNTER — OFFICE VISIT (OUTPATIENT)
Dept: FAMILY MEDICINE | Facility: CLINIC | Age: 42
End: 2022-06-20
Payer: MEDICAID

## 2022-06-20 VITALS
WEIGHT: 153 LBS | HEART RATE: 91 BPM | OXYGEN SATURATION: 97 % | HEIGHT: 62 IN | DIASTOLIC BLOOD PRESSURE: 62 MMHG | SYSTOLIC BLOOD PRESSURE: 104 MMHG | BODY MASS INDEX: 28.16 KG/M2

## 2022-06-20 DIAGNOSIS — Z00.00 WELLNESS EXAMINATION: Primary | ICD-10-CM

## 2022-06-20 DIAGNOSIS — Z12.4 CERVICAL CANCER SCREENING: ICD-10-CM

## 2022-06-20 DIAGNOSIS — F31.81 BIPOLAR 2 DISORDER: ICD-10-CM

## 2022-06-20 DIAGNOSIS — M06.00 SERONEGATIVE RHEUMATOID ARTHRITIS: ICD-10-CM

## 2022-06-20 DIAGNOSIS — G93.32 CHRONIC FATIGUE SYNDROME: ICD-10-CM

## 2022-06-20 DIAGNOSIS — T63.481S ALLERGIC REACTION TO INSECT STING, ACCIDENTAL OR UNINTENTIONAL, SEQUELA: ICD-10-CM

## 2022-06-20 PROCEDURE — 99214 OFFICE O/P EST MOD 30 MIN: CPT | Mod: PBBFAC,PO | Performed by: FAMILY MEDICINE

## 2022-06-20 PROCEDURE — 99396 PR PREVENTIVE VISIT,EST,40-64: ICD-10-PCS | Mod: S$PBB,,, | Performed by: FAMILY MEDICINE

## 2022-06-20 PROCEDURE — 3008F PR BODY MASS INDEX (BMI) DOCUMENTED: ICD-10-PCS | Mod: CPTII,,, | Performed by: FAMILY MEDICINE

## 2022-06-20 PROCEDURE — 3074F SYST BP LT 130 MM HG: CPT | Mod: CPTII,,, | Performed by: FAMILY MEDICINE

## 2022-06-20 PROCEDURE — 1159F MED LIST DOCD IN RCRD: CPT | Mod: CPTII,,, | Performed by: FAMILY MEDICINE

## 2022-06-20 PROCEDURE — 99999 PR PBB SHADOW E&M-EST. PATIENT-LVL IV: ICD-10-PCS | Mod: PBBFAC,,, | Performed by: FAMILY MEDICINE

## 2022-06-20 PROCEDURE — 99999 PR PBB SHADOW E&M-EST. PATIENT-LVL IV: CPT | Mod: PBBFAC,,, | Performed by: FAMILY MEDICINE

## 2022-06-20 PROCEDURE — 1159F PR MEDICATION LIST DOCUMENTED IN MEDICAL RECORD: ICD-10-PCS | Mod: CPTII,,, | Performed by: FAMILY MEDICINE

## 2022-06-20 PROCEDURE — 3078F PR MOST RECENT DIASTOLIC BLOOD PRESSURE < 80 MM HG: ICD-10-PCS | Mod: CPTII,,, | Performed by: FAMILY MEDICINE

## 2022-06-20 PROCEDURE — 99396 PREV VISIT EST AGE 40-64: CPT | Mod: S$PBB,,, | Performed by: FAMILY MEDICINE

## 2022-06-20 PROCEDURE — 3074F PR MOST RECENT SYSTOLIC BLOOD PRESSURE < 130 MM HG: ICD-10-PCS | Mod: CPTII,,, | Performed by: FAMILY MEDICINE

## 2022-06-20 PROCEDURE — 3078F DIAST BP <80 MM HG: CPT | Mod: CPTII,,, | Performed by: FAMILY MEDICINE

## 2022-06-20 PROCEDURE — 3008F BODY MASS INDEX DOCD: CPT | Mod: CPTII,,, | Performed by: FAMILY MEDICINE

## 2022-06-20 RX ORDER — TRAZODONE HYDROCHLORIDE 50 MG/1
50 TABLET ORAL NIGHTLY
COMMUNITY
Start: 2022-06-09

## 2022-06-20 RX ORDER — EPINEPHRINE 0.3 MG/.3ML
2 INJECTION SUBCUTANEOUS ONCE
Qty: 2 EACH | Refills: 1 | Status: SHIPPED | OUTPATIENT
Start: 2022-06-20 | End: 2022-06-20

## 2022-06-20 NOTE — PROGRESS NOTES
Subjective:       Patient ID: Dorothy Jesus is a 42 y.o. female.    Chief Complaint: Follow-up    Here for wellness and f/u chronic issues.   Follows with local mental health department and stable.      Review of Systems   Constitutional: Negative for chills and fever.   Respiratory: Negative for cough, chest tightness and shortness of breath.    Cardiovascular: Negative for chest pain, palpitations and leg swelling.   Endocrine: Negative for cold intolerance and heat intolerance.   Psychiatric/Behavioral: Negative for decreased concentration. The patient is not nervous/anxious.        Objective:      Physical Exam  Vitals and nursing note reviewed.   Constitutional:       Appearance: She is well-developed.   HENT:      Head: Normocephalic and atraumatic.   Cardiovascular:      Rate and Rhythm: Normal rate and regular rhythm.      Heart sounds: Normal heart sounds.   Pulmonary:      Effort: Pulmonary effort is normal.      Breath sounds: Normal breath sounds.   Psychiatric:         Mood and Affect: Mood normal.         Behavior: Behavior normal.         Assessment:       1. Wellness examination    2. Seronegative rheumatoid arthritis    3. Chronic fatigue syndrome    4. Allergic reaction to insect sting, accidental or unintentional, sequela    5. Bipolar 2 disorder    6. Cervical cancer screening        Plan:       Wellness examination    Seronegative rheumatoid arthritis  -     Lipid Panel; Future; Expected date: 06/20/2022  -     TSH; Future; Expected date: 06/20/2022    Chronic fatigue syndrome  -     Lipid Panel; Future; Expected date: 06/20/2022  -     TSH; Future; Expected date: 06/20/2022    Allergic reaction to insect sting, accidental or unintentional, sequela  -     EPINEPHrine (EPIPEN) 0.3 mg/0.3 mL AtIn; Inject 0.6 mLs (0.6 mg total) into the muscle once. for 1 dose  Dispense: 2 each; Refill: 1    Bipolar 2 disorder    Cervical cancer screening  -     Ambulatory referral/consult to Gynecology;  Future; Expected date: 06/27/2022      Refill epi due to allergic reactions to stinging insects in past  Update labs and health maintenance      Follow up in about 6 months (around 12/20/2022), or if symptoms worsen or fail to improve.

## 2022-07-12 ENCOUNTER — PATIENT MESSAGE (OUTPATIENT)
Dept: PHARMACY | Facility: CLINIC | Age: 42
End: 2022-07-12
Payer: MEDICAID

## 2022-07-13 ENCOUNTER — SPECIALTY PHARMACY (OUTPATIENT)
Dept: PHARMACY | Facility: CLINIC | Age: 42
End: 2022-07-13
Payer: MEDICAID

## 2022-07-13 NOTE — TELEPHONE ENCOUNTER
Specialty Pharmacy - Refill Coordination    Specialty Medication Orders Linked to Encounter    Flowsheet Row Most Recent Value   Medication #1 etanercept (ENBREL SURECLICK) 50 mg/mL (1 mL) (Order#[id number], Rx#[id number])          Refill Questions - Documented Responses    Flowsheet Row Most Recent Value   Patient Availability and HIPAA Verification    Does patient want to proceed with activity? Yes   HIPAA/medical authority confirmed? Yes   Relationship to patient of person spoken to? Self   Refill Screening Questions    Changes to allergies? No   Changes to medications? No   New conditions since last clinic visit? No   Unplanned office visit, urgent care, ED, or hospital admission in the last 4 weeks? No   How does patient/caregiver feel medication is working? Good   Financial problems or insurance changes? No   How many doses of your specialty medications were missed in the last 4 weeks? 0   Would patient like to speak to a pharmacist? No   When does the patient need to receive the medication? 07/17/22   Refill Delivery Questions    How will the patient receive the medication? Delivery Loida   When does the patient need to receive the medication? 07/17/22   Shipping Address Home   Address in [facility] confirmed and updated if neccessary? Yes   Expected Copay ($) 0   Is the patient able to afford the medication copay? Yes   Payment Method zero copay   Days supply of Refill 28   Supplies needed? No supplies needed   Refill activity completed? Yes   Refill activity plan Refill scheduled   Shipment/Pickup Date: 07/14/22          Current Outpatient Medications   Medication Sig    buPROPion (WELLBUTRIN XL) 300 MG 24 hr tablet Take 300 mg by mouth once daily.     clonazePAM (KLONOPIN) 0.5 MG tablet     dextroamphetamine-amphetamine (ADDERALL XR) 20 MG 24 hr capsule Take 20 mg by mouth every morning.     EPINEPHrine (EPIPEN) 0.3 mg/0.3 mL AtIn Inject 0.6 mLs (0.6 mg total) into the muscle once. for 1 dose     etanercept (ENBREL SURECLICK) 50 mg/mL (1 mL) Inject 1 mL (50 mg total) into the skin once a week.    meloxicam (MOBIC) 15 MG tablet Take 1 tablet (15 mg total) by mouth once daily.    metoprolol succinate (TOPROL-XL) 25 MG 24 hr tablet TAKE ONE TABLET (25MG) BY MOUTH ONCE DAILY    predniSONE (DELTASONE) 5 MG tablet TAKE ONE TABLET (5MG) BY MOUTH ONCE DAILY    sertraline (ZOLOFT) 100 MG tablet Take 100 mg by mouth once daily.    tiZANidine (ZANAFLEX) 4 MG tablet TAKE ONE TABLET (4 MG TOTAL) BY MOUTH TWO TIMES A DAY AS NEEDED    traZODone (DESYREL) 50 MG tablet Take 50 mg by mouth nightly.   Last reviewed on 6/20/2022  9:39 AM by Ainsley Arora MA    Review of patient's allergies indicates:   Allergen Reactions    Gabapentin Other (See Comments)     Increased pain level    Lithium analogues Other (See Comments)     Suicidal thoughts    Nortriptyline Other (See Comments)     Paradoxical effect: sleepless, anxious and increased pain levels    Topamax [topiramate] Other (See Comments)     Lost vision    Sudafed [pseudoephedrine hcl]     Pseudoephedrine Anxiety    Last reviewed on  6/20/2022 9:39 AM by Ainsley Arora      Tasks added this encounter   8/7/2022 - Refill Call (Auto Added)   Tasks due within next 3 months   No tasks due.     Mary Alvarez [facility]  [address] [place] [address]  Phone: [phone]  Fax: [phone]

## 2022-08-08 ENCOUNTER — SPECIALTY PHARMACY (OUTPATIENT)
Dept: PHARMACY | Facility: CLINIC | Age: 42
End: 2022-08-08
Payer: MEDICAID

## 2022-08-08 ENCOUNTER — PATIENT MESSAGE (OUTPATIENT)
Dept: PHARMACY | Facility: CLINIC | Age: 42
End: 2022-08-08
Payer: MEDICAID

## 2022-08-08 NOTE — TELEPHONE ENCOUNTER
Specialty Pharmacy - Refill Coordination    Specialty Medication Orders Linked to Encounter    Flowsheet Row Most Recent Value   Medication #1 etanercept (ENBREL SURECLICK) 50 mg/mL (1 mL) (Order#[id number], Rx#[id number])          Refill Questions - Documented Responses    Flowsheet Row Most Recent Value   Patient Availability and HIPAA Verification    Does patient want to proceed with activity? Yes   HIPAA/medical authority confirmed? Yes   Relationship to patient of person spoken to? Self   Refill Screening Questions    Changes to allergies? No   Changes to medications? No   New conditions since last clinic visit? No   Unplanned office visit, urgent care, ED, or hospital admission in the last 4 weeks? No   How does patient/caregiver feel medication is working? Good   Financial problems or insurance changes? No   How many doses of your specialty medications were missed in the last 4 weeks? 0   Would patient like to speak to a pharmacist? No   When does the patient need to receive the medication? 08/17/22   Refill Delivery Questions    How will the patient receive the medication? Delivery Loida   When does the patient need to receive the medication? 08/17/22   Shipping Address Home   Address in [facility] confirmed and updated if neccessary? Yes   Expected Copay ($) 0   Is the patient able to afford the medication copay? Yes   Payment Method zero copay   Days supply of Refill 28   Supplies needed? No supplies needed   Refill activity completed? Yes   Refill activity plan Refill scheduled   Shipment/Pickup Date: 08/09/22          Current Outpatient Medications   Medication Sig    buPROPion (WELLBUTRIN XL) 300 MG 24 hr tablet Take 300 mg by mouth once daily.     clonazePAM (KLONOPIN) 0.5 MG tablet     dextroamphetamine-amphetamine (ADDERALL XR) 20 MG 24 hr capsule Take 20 mg by mouth every morning.     EPINEPHrine (EPIPEN) 0.3 mg/0.3 mL AtIn Inject 0.6 mLs (0.6 mg total) into the muscle once. for 1 dose     etanercept (ENBREL SURECLICK) 50 mg/mL (1 mL) Inject 1 mL (50 mg total) into the skin once a week.    meloxicam (MOBIC) 15 MG tablet Take 1 tablet (15 mg total) by mouth once daily.    metoprolol succinate (TOPROL-XL) 25 MG 24 hr tablet TAKE ONE TABLET (25MG) BY MOUTH ONCE DAILY    predniSONE (DELTASONE) 5 MG tablet TAKE ONE TABLET (5MG) BY MOUTH ONCE DAILY    sertraline (ZOLOFT) 100 MG tablet Take 100 mg by mouth once daily.    tiZANidine (ZANAFLEX) 4 MG tablet TAKE ONE TABLET (4 MG TOTAL) BY MOUTH TWO TIMES A DAY AS NEEDED    traZODone (DESYREL) 50 MG tablet Take 50 mg by mouth nightly.   Last reviewed on 6/20/2022  9:39 AM by Ainsley Arora MA    Review of patient's allergies indicates:   Allergen Reactions    Gabapentin Other (See Comments)     Increased pain level    Lithium analogues Other (See Comments)     Suicidal thoughts    Nortriptyline Other (See Comments)     Paradoxical effect: sleepless, anxious and increased pain levels    Topamax [topiramate] Other (See Comments)     Lost vision    Sudafed [pseudoephedrine hcl]     Pseudoephedrine Anxiety    Last reviewed on  6/20/2022 9:39 AM by Ainsley Arora      Tasks added this encounter   9/7/2022 - Refill Call (Auto Added)   Tasks due within next 3 months   No tasks due.     Aileen Alvarez [facility]  [address] [place] [address]  Phone: [phone]  Fax: [phone]

## 2022-08-10 NOTE — TELEPHONE ENCOUNTER
Informed patient through [facility], medication was sent in.    No new care gaps identified.  [facility] Embedded Care Gaps. Reference number: [id number]. 8/10/2022   10:55:24 AM CDT

## 2022-08-15 DIAGNOSIS — R00.0 TACHYCARDIA: ICD-10-CM

## 2022-08-15 NOTE — TELEPHONE ENCOUNTER
No new care gaps identified.  [facility] Embedded Care Gaps. Reference number: [id number]. 8/15/2022   4:21:26 PM CDT

## 2022-08-16 RX ORDER — METOPROLOL SUCCINATE 25 MG/1
TABLET, EXTENDED RELEASE ORAL
Qty: 90 TABLET | Refills: 3 | Status: SHIPPED | OUTPATIENT
Start: 2022-08-16 | End: 2023-09-10

## 2022-08-16 NOTE — TELEPHONE ENCOUNTER
Refill Decision Note   Dorothy Jesus  is requesting a refill authorization.  Brief Assessment and Rationale for Refill:  Approve     Medication Therapy Plan:       Medication Reconciliation Completed: No   Comments:     No Care Gaps recommended.     Note composed:11:57 AM 08/16/2022

## 2022-08-24 ENCOUNTER — PATIENT MESSAGE (OUTPATIENT)
Dept: ADMINISTRATIVE | Facility: HOSPITAL | Age: 42
End: 2022-08-24
Payer: MEDICAID

## 2022-09-12 ENCOUNTER — SPECIALTY PHARMACY (OUTPATIENT)
Dept: PHARMACY | Facility: CLINIC | Age: 42
End: 2022-09-12
Payer: MEDICAID

## 2022-09-12 NOTE — TELEPHONE ENCOUNTER
Specialty Pharmacy - Refill Coordination    Specialty Medication Orders Linked to Encounter      Flowsheet Row Most Recent Value   Medication #1 etanercept (ENBREL SURECLICK) 50 mg/mL (1 mL) (Order#[id number], Rx#[id number])            Refill Questions - Documented Responses      Flowsheet Row Most Recent Value   Patient Availability and HIPAA Verification    Does patient want to proceed with activity? Yes   HIPAA/medical authority confirmed? Yes   Relationship to patient of person spoken to? Self   Refill Screening Questions    Changes to allergies? No   Changes to medications? No   New conditions since last clinic visit? No   Unplanned office visit, urgent care, ED, or hospital admission in the last 4 weeks? No   How does patient/caregiver feel medication is working? Very good   Financial problems or insurance changes? No   How many doses of your specialty medications were missed in the last 4 weeks? 0   Would patient like to speak to a pharmacist? No   When does the patient need to receive the medication? 09/14/22   Refill Delivery Questions    How will the patient receive the medication? Delivery Loida   When does the patient need to receive the medication? 09/14/22   Shipping Address Home   Address in [facility] confirmed and updated if neccessary? Yes   Expected Copay ($) 0   Is the patient able to afford the medication copay? Yes   Payment Method zero copay   Days supply of Refill 28   Supplies needed? No supplies needed   Refill activity completed? Yes   Refill activity plan Refill scheduled   Shipment/Pickup Date: 09/13/22            Current Outpatient Medications   Medication Sig    buPROPion (WELLBUTRIN XL) 300 MG 24 hr tablet Take 300 mg by mouth once daily.     clonazePAM (KLONOPIN) 0.5 MG tablet     dextroamphetamine-amphetamine (ADDERALL XR) 20 MG 24 hr capsule Take 20 mg by mouth every morning.     EPINEPHrine (EPIPEN) 0.3 mg/0.3 mL AtIn Inject 0.6 mLs (0.6 mg total) into the muscle once. for 1  dose    etanercept (ENBREL SURECLICK) 50 mg/mL (1 mL) Inject 1 mL (50 mg total) into the skin once a week.    meloxicam (MOBIC) 15 MG tablet Take 1 tablet (15 mg total) by mouth once daily.    metoprolol succinate (TOPROL-XL) 25 MG 24 hr tablet TAKE ONE TABLET (25 MG TOTAL) BY MOUTH ONCE DAILY    predniSONE (DELTASONE) 5 MG tablet TAKE ONE TABLET (5MG) BY MOUTH ONCE DAILY    sertraline (ZOLOFT) 100 MG tablet Take 100 mg by mouth once daily.    tiZANidine (ZANAFLEX) 4 MG tablet TAKE ONE TABLET (4 MG TOTAL) BY MOUTH TWO TIMES A DAY AS NEEDED    traZODone (DESYREL) 50 MG tablet Take 50 mg by mouth nightly.   Last reviewed on 6/20/2022  9:39 AM by Ainsley Arora MA    Review of patient's allergies indicates:   Allergen Reactions    Gabapentin Other (See Comments)     Increased pain level    Lithium analogues Other (See Comments)     Suicidal thoughts    Nortriptyline Other (See Comments)     Paradoxical effect: sleepless, anxious and increased pain levels    Topamax [topiramate] Other (See Comments)     Lost vision    Sudafed [pseudoephedrine hcl]     Pseudoephedrine Anxiety    Last reviewed on  6/20/2022 9:39 AM by Ainsley Arora      Tasks added this encounter   10/5/2022 - Refill Call (Auto Added)   Tasks due within next 3 months   No tasks due.     Mirian Adams, Patient Care Assistant  [facility]  [address]  [place] [address]  Phone: [phone]  Fax: [phone]

## 2022-10-05 ENCOUNTER — SPECIALTY PHARMACY (OUTPATIENT)
Dept: PHARMACY | Facility: CLINIC | Age: 42
End: 2022-10-05
Payer: MEDICAID

## 2022-10-05 NOTE — TELEPHONE ENCOUNTER
Outgoing call: Spoke with patient regarding refill due to inject today 10/5 abd have an injection for it. I informed her that the insurance was rejecting too soon until 10/6, [facility] will follow up them.

## 2022-10-10 ENCOUNTER — OFFICE VISIT (OUTPATIENT)
Dept: URGENT CARE | Facility: CLINIC | Age: 42
End: 2022-10-10
Payer: MEDICAID

## 2022-10-10 VITALS
OXYGEN SATURATION: 100 % | DIASTOLIC BLOOD PRESSURE: 77 MMHG | TEMPERATURE: 99 F | HEART RATE: 105 BPM | WEIGHT: 150 LBS | RESPIRATION RATE: 16 BRPM | BODY MASS INDEX: 27.6 KG/M2 | SYSTOLIC BLOOD PRESSURE: 114 MMHG | HEIGHT: 62 IN

## 2022-10-10 DIAGNOSIS — R50.9 FEVER, UNSPECIFIED FEVER CAUSE: ICD-10-CM

## 2022-10-10 DIAGNOSIS — J06.9 VIRAL URI WITH COUGH: Primary | ICD-10-CM

## 2022-10-10 DIAGNOSIS — Z20.828 EXPOSURE TO THE FLU: ICD-10-CM

## 2022-10-10 DIAGNOSIS — R05.9 COUGH, UNSPECIFIED TYPE: ICD-10-CM

## 2022-10-10 LAB
CTP QC/QA: YES
CTP QC/QA: YES
POC MOLECULAR INFLUENZA A AGN: NEGATIVE
POC MOLECULAR INFLUENZA B AGN: NEGATIVE
SARS-COV-2 RDRP RESP QL NAA+PROBE: NEGATIVE

## 2022-10-10 PROCEDURE — 1159F MED LIST DOCD IN RCRD: CPT | Mod: CPTII,S$GLB,, | Performed by: NURSE PRACTITIONER

## 2022-10-10 PROCEDURE — 1159F PR MEDICATION LIST DOCUMENTED IN MEDICAL RECORD: ICD-10-PCS | Mod: CPTII,S$GLB,, | Performed by: NURSE PRACTITIONER

## 2022-10-10 PROCEDURE — 1160F RVW MEDS BY RX/DR IN RCRD: CPT | Mod: CPTII,S$GLB,, | Performed by: NURSE PRACTITIONER

## 2022-10-10 PROCEDURE — 3008F BODY MASS INDEX DOCD: CPT | Mod: CPTII,S$GLB,, | Performed by: NURSE PRACTITIONER

## 2022-10-10 PROCEDURE — 87502 INFLUENZA DNA AMP PROBE: CPT | Mod: QW,S$GLB,, | Performed by: NURSE PRACTITIONER

## 2022-10-10 PROCEDURE — 3008F PR BODY MASS INDEX (BMI) DOCUMENTED: ICD-10-PCS | Mod: CPTII,S$GLB,, | Performed by: NURSE PRACTITIONER

## 2022-10-10 PROCEDURE — 87502 POCT INFLUENZA A/B MOLECULAR: ICD-10-PCS | Mod: QW,S$GLB,, | Performed by: NURSE PRACTITIONER

## 2022-10-10 PROCEDURE — 1160F PR REVIEW ALL MEDS BY PRESCRIBER/CLIN PHARMACIST DOCUMENTED: ICD-10-PCS | Mod: CPTII,S$GLB,, | Performed by: NURSE PRACTITIONER

## 2022-10-10 PROCEDURE — 99213 PR OFFICE/OUTPT VISIT, EST, LEVL III, 20-29 MIN: ICD-10-PCS | Mod: S$GLB,,, | Performed by: NURSE PRACTITIONER

## 2022-10-10 PROCEDURE — 3078F PR MOST RECENT DIASTOLIC BLOOD PRESSURE < 80 MM HG: ICD-10-PCS | Mod: CPTII,S$GLB,, | Performed by: NURSE PRACTITIONER

## 2022-10-10 PROCEDURE — 3074F PR MOST RECENT SYSTOLIC BLOOD PRESSURE < 130 MM HG: ICD-10-PCS | Mod: CPTII,S$GLB,, | Performed by: NURSE PRACTITIONER

## 2022-10-10 PROCEDURE — 3078F DIAST BP <80 MM HG: CPT | Mod: CPTII,S$GLB,, | Performed by: NURSE PRACTITIONER

## 2022-10-10 PROCEDURE — 87635 SARS-COV-2 COVID-19 AMP PRB: CPT | Mod: QW,S$GLB,, | Performed by: NURSE PRACTITIONER

## 2022-10-10 PROCEDURE — 3074F SYST BP LT 130 MM HG: CPT | Mod: CPTII,S$GLB,, | Performed by: NURSE PRACTITIONER

## 2022-10-10 PROCEDURE — 87635: ICD-10-PCS | Mod: QW,S$GLB,, | Performed by: NURSE PRACTITIONER

## 2022-10-10 PROCEDURE — 99213 OFFICE O/P EST LOW 20 MIN: CPT | Mod: S$GLB,,, | Performed by: NURSE PRACTITIONER

## 2022-10-10 RX ORDER — OSELTAMIVIR PHOSPHATE 75 MG/1
75 CAPSULE ORAL DAILY
Qty: 7 CAPSULE | Refills: 0 | Status: SHIPPED | OUTPATIENT
Start: 2022-10-10 | End: 2022-10-17

## 2022-10-10 RX ORDER — BENZONATATE 200 MG/1
200 CAPSULE ORAL 3 TIMES DAILY PRN
Qty: 30 CAPSULE | Refills: 0 | Status: SHIPPED | OUTPATIENT
Start: 2022-10-10 | End: 2023-10-30

## 2022-10-10 NOTE — PROGRESS NOTES
"Subjective:       Patient ID: Dorothy Jesus is a 42 y.o. female.    Vitals:  height is 5' 2" (1.575 m) and weight is 68 kg (150 lb). Her oral temperature is 99.2 °F (37.3 °C). Her blood pressure is 114/77 and her pulse is 105. Her respiration is 16 and oxygen saturation is 100%.     Chief Complaint: Cough and Dizziness (/)    PT presents today with cough, congestion, and dizziness. X's 2 days. Pt has taken OTC meds with mild relief. PT's  has flu, would like paper prescription if necessary.    Provider note begins below:  Patient would like prophylactic treatment for Flu exposure. Denies N/V/D, cp, sob or abdominal pain. Awake and alert. NAD. Afebrile.    Cough  This is a new problem. Episode onset: 2 days. The problem has been unchanged. The problem occurs every few minutes. The cough is Non-productive. Associated symptoms include chills, a fever, headaches, nasal congestion and sweats. Pertinent negatives include no chest pain, ear congestion, ear pain, heartburn, hemoptysis, myalgias, postnasal drip, rash, rhinorrhea, sore throat, shortness of breath, weight loss or wheezing. Nothing aggravates the symptoms. She has tried body position changes and rest for the symptoms. The treatment provided mild relief. Her past medical history is significant for bronchitis and pneumonia. There is no history of asthma, bronchiectasis, COPD, emphysema or environmental allergies.   Dizziness:   Chronicity:  New  Onset: 2 days.  Progression since onset:  Unchanged  Frequency:  Constantly  Severity:  Moderate  Duration:  Off/on all day  Dizziness characteristics:  Trouble focusing eyes, spinning inside head only, spacial disorientation and sensation of movement   Associated symptoms: a fever, headaches, tinnitus, nausea and vomiting.no hearing loss, no ear congestion, no ear pain, no diaphoresis, no aural fullness, no weakness, no visual disturbances, no light-headedness, no syncope, no palpitations, no panic, no " facial weakness, no slurred speech, no numbness in extremities and no chest pain.  Aggravated by:  Nothing  Treatments tried:  Body position changes  Improvements on treatment:  No relief   PMH includes: ear infections.no strokes, no cardiac surgery, no neurologic disease, no head trauma, no balance testing, no ear trauma, no ear surgery, no head trauma, no anxiety, no ear tubes, no environmental allergies, no MRI head and no CT head.    Constitution: Positive for chills and fever. Negative for sweating and fatigue.   HENT:  Positive for tinnitus. Negative for ear pain, hearing loss, facial swelling, congestion, postnasal drip and sore throat.    Neck: Negative for painful lymph nodes.   Cardiovascular: Negative.  Negative for chest trauma, chest pain, palpitations, sob on exertion and passing out.   Eyes: Negative.  Negative for eye itching and eye pain.   Respiratory:  Positive for cough. Negative for chest tightness, bloody sputum, shortness of breath, wheezing and asthma.    Gastrointestinal:  Positive for nausea and vomiting. Negative for diarrhea and heartburn.   Endocrine: negative. cold intolerance and excessive thirst.   Genitourinary: Negative.  Negative for dysuria, frequency, urgency and hematuria.   Musculoskeletal:  Negative for pain, trauma, joint pain and muscle ache.   Skin: Negative.  Negative for rash, wound and hives.   Allergic/Immunologic: Negative.  Negative for environmental allergies, eczema, asthma, hives and itching.   Neurological:  Positive for dizziness and headaches. Negative for light-headedness, disorientation and altered mental status.   Hematologic/Lymphatic: Negative.  Negative for swollen lymph nodes.   Psychiatric/Behavioral: Negative.  Negative for altered mental status, disorientation and confusion.      Objective:      Physical Exam   Constitutional: She is oriented to person, place, and time. She appears well-developed. She is cooperative.  Non-toxic appearance. She does not  appear ill. No distress.   HENT:   Head: Normocephalic and atraumatic.   Ears:   Right Ear: Hearing, tympanic membrane, external ear and ear canal normal. impacted cerumen  Left Ear: Hearing, tympanic membrane, external ear and ear canal normal. impacted cerumen  Nose: Nose normal. No mucosal edema, rhinorrhea, nasal deformity or congestion. No epistaxis. Right sinus exhibits no maxillary sinus tenderness and no frontal sinus tenderness. Left sinus exhibits no maxillary sinus tenderness and no frontal sinus tenderness.   Mouth/Throat: Uvula is midline, oropharynx is clear and moist and mucous membranes are normal. No trismus in the jaw. Normal dentition. No uvula swelling. No oropharyngeal exudate, posterior oropharyngeal edema or posterior oropharyngeal erythema.   Eyes: Conjunctivae and lids are normal. No scleral icterus.   Neck: Trachea normal and phonation normal. Neck supple. No edema present. No erythema present. No neck rigidity present.   Cardiovascular: Normal rate, regular rhythm, normal heart sounds and normal pulses.   Pulmonary/Chest: Effort normal and breath sounds normal. No stridor. No respiratory distress. She has no decreased breath sounds. She has no wheezes. She has no rhonchi. She has no rales. She exhibits no tenderness.   Abdominal: Normal appearance. Soft. flat abdomen There is no abdominal tenderness. There is no left CVA tenderness and no right CVA tenderness.   Musculoskeletal: Normal range of motion.         General: No deformity. Normal range of motion.   Lymphadenopathy:     She has no cervical adenopathy.   Neurological: no focal deficit. She is alert and oriented to person, place, and time. She exhibits normal muscle tone. Coordination normal.   Skin: Skin is warm, dry, intact, not diaphoretic and not pale.   Psychiatric: Her speech is normal and behavior is normal. Mood, judgment and thought content normal.   Nursing note and vitals reviewed.  The following results have been  reviewed with the patient:  LABS-  Results for orders placed or performed in visit on 10/10/22   POCT Influenza A/B MOLECULAR   Result Value Ref Range    POC Molecular Influenza A Ag Negative Negative, Not Reported    POC Molecular Influenza B Ag Negative Negative, Not Reported     Acceptable Yes    POCT COVID-19 Rapid Screening   Result Value Ref Range    POC Rapid COVID Negative Negative     Acceptable Yes         IMAGING-  No results found.        Assessment:       1. Viral URI with cough    2. Cough, unspecified type    3. Fever, unspecified fever cause    4. Exposure to the flu          Plan:       FOLLOWUP  Follow up if symptoms worsen or fail to improve, for PLEASE CONTACT PCP OR CONTACT THE EMERGENCY ROOM..     PATIENT INSTRUCTIONS  Patient Instructions   INSTRUCTIONS:  - Rest.  - Drink plenty of fluids.  - Take Tylenol and/or Ibuprofen as directed as needed for fever/pain.  Do not take more than the recommended dose.  - follow up with your PCP within the next 1-2 weeks as needed.  - You must understand that you have received an Urgent Care treatment only and that you may be released before all of your medical problems are known or treated.   - You, the patient, will arrange for follow up care as instructed.   - If your condition worsens or fails to improve we recommend that you receive another evaluation at the ER immediately or contact your PCP to discuss your concerns.   - You can call [phone] or [phone] to help schedule an appointment with the appropriate provider.     -If you smoke cigarettes, it would be beneficial for you to stop.    OVER THE COUNTER RECOMMENDATIONS/SUGGESTIONS.     Make sure to stay well hydrated.     Use Nasal Saline to mechanically move any post nasal drip from your eustachian tube or from the back of your throat.     Use warm salt water gargles to ease your throat pain. Warm salt water gargles as needed for sore throat-  1/2 tsp salt to  1 cup warm water, gargle as desired.     Use an antihistamine such as Claritin, Zyrtec or Allegra to dry you out.      Use pseudoephedrine (behind the counter) to decongest. Pseudoephedrine  30 mg up to 240 mg /day. It can raise your blood pressure and give you palpitations.     Use mucinex (guaifenisin) to break up mucous up to 2400mg/day to loosen any mucous.   The mucinex DM pill has a cough suppressant that can be sedating. It can be used at night to stop the tickle at the back of your throat.  You can use Mucinex D (it has guaifenesin and a high dose of pseudoephedrine) in the mornings to help decongest.        Use Afrin in each nare for no longer than 3 days, as it is addictive. It can also dry out your mucous membranes and cause elevated blood pressure. This is especially useful if you are flying.     Use Flonase 1-2 sprays/nostril per day. It is a local acting steroid nasal spray, if you develop a bloody nose, stop using the medication immediately.     Sometimes Nyquil at night is beneficial to help you get some rest, however it is sedating and it does have an antihistamine, and tylenol.     Honey is a natural cough suppressant that can be used.     Tylenol up to 4,000 mg a day is safe for short periods and can be used for body aches, pain, and fever. However in high doses and prolonged use it can cause liver irritation.     Ibuprofen is a non-steroidal anti-inflammatory that can be used for body aches, pain, and fever.However it can also cause stomach irritation if over used.         THANK YOU FOR ALLOWING ME TO PARTICIPATE IN YOUR HEALTHCARE,     Nii Tillman, PATRICK     Viral URI with cough    Cough, unspecified type  -     POCT Influenza A/B MOLECULAR  -     benzonatate (TESSALON) 200 MG capsule; Take 1 capsule (200 mg total) by mouth 3 (three) times daily as needed for Cough.  Dispense: 30 capsule; Refill: 0    Fever, unspecified fever cause  -     POCT COVID-19 Rapid Screening    Exposure to the flu  -      oseltamivir (TAMIFLU) 75 MG capsule; Take 1 capsule (75 mg total) by mouth once daily. for 7 days  Dispense: 7 capsule; Refill: 0

## 2022-10-10 NOTE — PATIENT INSTRUCTIONS
INSTRUCTIONS:  - Rest.  - Drink plenty of fluids.  - Take Tylenol and/or Ibuprofen as directed as needed for fever/pain.  Do not take more than the recommended dose.  - follow up with your PCP within the next 1-2 weeks as needed.  - You must understand that you have received an Urgent Care treatment only and that you may be released before all of your medical problems are known or treated.   - You, the patient, will arrange for follow up care as instructed.   - If your condition worsens or fails to improve we recommend that you receive another evaluation at the ER immediately or contact your PCP to discuss your concerns.   - You can call [phone] or [phone] to help schedule an appointment with the appropriate provider.     -If you smoke cigarettes, it would be beneficial for you to stop.    OVER THE COUNTER RECOMMENDATIONS/SUGGESTIONS.     Make sure to stay well hydrated.     Use Nasal Saline to mechanically move any post nasal drip from your eustachian tube or from the back of your throat.     Use warm salt water gargles to ease your throat pain. Warm salt water gargles as needed for sore throat-  1/2 tsp salt to 1 cup warm water, gargle as desired.     Use an antihistamine such as Claritin, Zyrtec or Allegra to dry you out.      Use pseudoephedrine (behind the counter) to decongest. Pseudoephedrine  30 mg up to 240 mg /day. It can raise your blood pressure and give you palpitations.     Use mucinex (guaifenisin) to break up mucous up to 2400mg/day to loosen any mucous.   The mucinex DM pill has a cough suppressant that can be sedating. It can be used at night to stop the tickle at the back of your throat.  You can use Mucinex D (it has guaifenesin and a high dose of pseudoephedrine) in the mornings to help decongest.        Use Afrin in each nare for no longer than 3 days, as it is addictive. It can also dry out your mucous membranes and cause elevated blood pressure. This is especially useful if you  are flying.     Use Flonase 1-2 sprays/nostril per day. It is a local acting steroid nasal spray, if you develop a bloody nose, stop using the medication immediately.     Sometimes Nyquil at night is beneficial to help you get some rest, however it is sedating and it does have an antihistamine, and tylenol.     Honey is a natural cough suppressant that can be used.     Tylenol up to 4,000 mg a day is safe for short periods and can be used for body aches, pain, and fever. However in high doses and prolonged use it can cause liver irritation.     Ibuprofen is a non-steroidal anti-inflammatory that can be used for body aches, pain, and fever.However it can also cause stomach irritation if over used.

## 2022-10-14 NOTE — TELEPHONE ENCOUNTER
Specialty Pharmacy - Refill Coordination    Specialty Medication Orders Linked to Encounter      Flowsheet Row Most Recent Value   Medication #1 etanercept (ENBREL SURECLICK) 50 mg/mL (1 mL) (Order#[id number], Rx#[id number])        Patient had flu last week. See I-vent.    Refill Questions - Documented Responses      Flowsheet Row Most Recent Value   Patient Availability and HIPAA Verification    Does patient want to proceed with activity? Yes   HIPAA/medical authority confirmed? Yes   Relationship to patient of person spoken to? Self   Refill Screening Questions    Changes to allergies? No   Changes to medications? No   New conditions since last clinic visit? No   Unplanned office visit, urgent care, ED, or hospital admission in the last 4 weeks? Yes   How does patient/caregiver feel medication is working? Very good   Financial problems or insurance changes? No   How many doses of your specialty medications were missed in the last 4 weeks? 1   Why were doses missed? Felt ill or sick   Would patient like to speak to a pharmacist? No   When does the patient need to receive the medication? 10/17/22   Refill Delivery Questions    How will the patient receive the medication? [facility]   When does the patient need to receive the medication? 10/17/22   Shipping Address Home   Address in [facility] confirmed and updated if neccessary? Yes   Expected Copay ($) 0   Is the patient able to afford the medication copay? Yes   Payment Method zero copay   Days supply of Refill 28   Supplies needed? Alcohol Swabs   Refill activity completed? Yes   Refill activity plan Refill scheduled   Shipment/Pickup Date: 10/14/22            Current Outpatient Medications   Medication Sig    benzonatate (TESSALON) 200 MG capsule Take 1 capsule (200 mg total) by mouth 3 (three) times daily as needed for Cough.    buPROPion (WELLBUTRIN XL) 300 MG 24 hr tablet Take 300 mg by mouth once daily.     clonazePAM (KLONOPIN) 0.5 MG tablet      dextroamphetamine-amphetamine (ADDERALL XR) 20 MG 24 hr capsule Take 20 mg by mouth every morning.     EPINEPHrine (EPIPEN) 0.3 mg/0.3 mL AtIn Inject 0.6 mLs (0.6 mg total) into the muscle once. for 1 dose    etanercept (ENBREL SURECLICK) 50 mg/mL (1 mL) Inject 1 mL (50 mg total) into the skin once a week.    meloxicam (MOBIC) 15 MG tablet Take 1 tablet (15 mg total) by mouth once daily.    metoprolol succinate (TOPROL-XL) 25 MG 24 hr tablet TAKE ONE TABLET (25 MG TOTAL) BY MOUTH ONCE DAILY    oseltamivir (TAMIFLU) 75 MG capsule Take 1 capsule (75 mg total) by mouth once daily. for 7 days    predniSONE (DELTASONE) 5 MG tablet TAKE ONE TABLET (5MG) BY MOUTH ONCE DAILY    sertraline (ZOLOFT) 100 MG tablet Take 100 mg by mouth once daily.    tiZANidine (ZANAFLEX) 4 MG tablet TAKE ONE TABLET (4 MG TOTAL) BY MOUTH TWO TIMES A DAY AS NEEDED    traZODone (DESYREL) 50 MG tablet Take 50 mg by mouth nightly.   Last reviewed on 10/10/2022  4:42 PM by Nii Tillman NP    Review of patient's allergies indicates:   Allergen Reactions    Gabapentin Other (See Comments)     Increased pain level    Lithium analogues Other (See Comments)     Suicidal thoughts    Nortriptyline Other (See Comments)     Paradoxical effect: sleepless, anxious and increased pain levels    Topamax [topiramate] Other (See Comments)     Lost vision    Pseudoephedrine Anxiety    Sudafed [pseudoephedrine hcl] Anxiety and Palpitations    Last reviewed on  10/10/2022 4:42 PM by Nii Tillman    Interventions added this encounter   Closed: [facility] Patient Intervention: etanercept (ENBREL SURECLICK) 50 mg/mL (1 mL)     Tasks added this encounter   11/7/2022 - Refill Call (Auto Added)   Tasks due within next 3 months   No tasks due.     Melissa Calabrese, PharmD  [facility]  [address] [place] [id number]  Phone: [phone]  Fax: [phone]

## 2022-10-17 ENCOUNTER — TELEPHONE (OUTPATIENT)
Dept: RHEUMATOLOGY | Facility: CLINIC | Age: 42
End: 2022-10-17
Payer: MEDICAID

## 2022-10-17 ENCOUNTER — PATIENT MESSAGE (OUTPATIENT)
Dept: ADMINISTRATIVE | Facility: HOSPITAL | Age: 42
End: 2022-10-17
Payer: MEDICAID

## 2022-10-17 NOTE — TELEPHONE ENCOUNTER
LVM that she has been rescheduled to 11-1-22 at 3 pm. Reminding patient of labs not done  in August; can call [phone] to book labs. CG

## 2022-10-29 ENCOUNTER — LAB VISIT (OUTPATIENT)
Dept: LAB | Facility: HOSPITAL | Age: 42
End: 2022-10-29
Attending: INTERNAL MEDICINE
Payer: MEDICAID

## 2022-10-29 DIAGNOSIS — D84.9 IMMUNOSUPPRESSION: ICD-10-CM

## 2022-10-29 DIAGNOSIS — M06.00 SERONEGATIVE RHEUMATOID ARTHRITIS: ICD-10-CM

## 2022-10-29 DIAGNOSIS — G93.32 CHRONIC FATIGUE SYNDROME: ICD-10-CM

## 2022-10-29 LAB
ALBUMIN SERPL BCP-MCNC: 4 G/DL (ref 3.5–5.2)
ALP SERPL-CCNC: 79 U/L (ref 55–135)
ALT SERPL W/O P-5'-P-CCNC: 12 U/L (ref 10–44)
ANION GAP SERPL CALC-SCNC: 12 MMOL/L (ref 8–16)
AST SERPL-CCNC: 18 U/L (ref 10–40)
BASOPHILS # BLD AUTO: 0.08 K/UL (ref 0–0.2)
BASOPHILS NFR BLD: 0.9 % (ref 0–1.9)
BILIRUB SERPL-MCNC: 0.4 MG/DL (ref 0.1–1)
BUN SERPL-MCNC: 13 MG/DL (ref 6–20)
CALCIUM SERPL-MCNC: 9.5 MG/DL (ref 8.7–10.5)
CHLORIDE SERPL-SCNC: 106 MMOL/L (ref 95–110)
CHOLEST SERPL-MCNC: 220 MG/DL (ref 120–199)
CHOLEST/HDLC SERPL: 5 {RATIO} (ref 2–5)
CO2 SERPL-SCNC: 22 MMOL/L (ref 23–29)
CREAT SERPL-MCNC: 1 MG/DL (ref 0.5–1.4)
DIFFERENTIAL METHOD: ABNORMAL
EOSINOPHIL # BLD AUTO: 0.2 K/UL (ref 0–0.5)
EOSINOPHIL NFR BLD: 2 % (ref 0–8)
ERYTHROCYTE [DISTWIDTH] IN BLOOD BY AUTOMATED COUNT: 12.5 % (ref 11.5–14.5)
ERYTHROCYTE [SEDIMENTATION RATE] IN BLOOD BY PHOTOMETRIC METHOD: <2 MM/HR (ref 0–36)
EST. GFR  (NO RACE VARIABLE): >60 ML/MIN/1.73 M^2
GLUCOSE SERPL-MCNC: 86 MG/DL (ref 70–110)
HCT VFR BLD AUTO: 41.7 % (ref 37–48.5)
HDLC SERPL-MCNC: 44 MG/DL (ref 40–75)
HDLC SERPL: 20 % (ref 20–50)
HGB BLD-MCNC: 13.8 G/DL (ref 12–16)
IMM GRANULOCYTES # BLD AUTO: 0.02 K/UL (ref 0–0.04)
IMM GRANULOCYTES NFR BLD AUTO: 0.2 % (ref 0–0.5)
LDLC SERPL CALC-MCNC: 135.6 MG/DL (ref 63–159)
LYMPHOCYTES # BLD AUTO: 3.3 K/UL (ref 1–4.8)
LYMPHOCYTES NFR BLD: 38.1 % (ref 18–48)
MCH RBC QN AUTO: 30.8 PG (ref 27–31)
MCHC RBC AUTO-ENTMCNC: 33.1 G/DL (ref 32–36)
MCV RBC AUTO: 93 FL (ref 82–98)
MONOCYTES # BLD AUTO: 0.7 K/UL (ref 0.3–1)
MONOCYTES NFR BLD: 7.5 % (ref 4–15)
NEUTROPHILS # BLD AUTO: 4.4 K/UL (ref 1.8–7.7)
NEUTROPHILS NFR BLD: 51.3 % (ref 38–73)
NONHDLC SERPL-MCNC: 176 MG/DL
NRBC BLD-RTO: 0 /100 WBC
PLATELET # BLD AUTO: 232 K/UL (ref 150–450)
PMV BLD AUTO: 13.6 FL (ref 9.2–12.9)
POTASSIUM SERPL-SCNC: 4.6 MMOL/L (ref 3.5–5.1)
PROT SERPL-MCNC: 7.1 G/DL (ref 6–8.4)
RBC # BLD AUTO: 4.48 M/UL (ref 4–5.4)
SODIUM SERPL-SCNC: 140 MMOL/L (ref 136–145)
TRIGL SERPL-MCNC: 202 MG/DL (ref 30–150)
TSH SERPL DL<=0.005 MIU/L-ACNC: 2.11 UIU/ML (ref 0.4–4)
WBC # BLD AUTO: 8.66 K/UL (ref 3.9–12.7)

## 2022-10-29 PROCEDURE — 36415 COLL VENOUS BLD VENIPUNCTURE: CPT | Mod: PO | Performed by: INTERNAL MEDICINE

## 2022-10-29 PROCEDURE — 80061 LIPID PANEL: CPT | Performed by: FAMILY MEDICINE

## 2022-10-29 PROCEDURE — 84443 ASSAY THYROID STIM HORMONE: CPT | Performed by: FAMILY MEDICINE

## 2022-10-29 PROCEDURE — 85652 RBC SED RATE AUTOMATED: CPT | Performed by: INTERNAL MEDICINE

## 2022-10-29 PROCEDURE — 80053 COMPREHEN METABOLIC PANEL: CPT | Performed by: INTERNAL MEDICINE

## 2022-10-29 PROCEDURE — 85025 COMPLETE CBC W/AUTO DIFF WBC: CPT | Performed by: INTERNAL MEDICINE

## 2022-11-01 ENCOUNTER — OFFICE VISIT (OUTPATIENT)
Dept: RHEUMATOLOGY | Facility: CLINIC | Age: 42
End: 2022-11-01
Payer: MEDICAID

## 2022-11-01 VITALS
WEIGHT: 148.56 LBS | DIASTOLIC BLOOD PRESSURE: 78 MMHG | HEIGHT: 62 IN | HEART RATE: 120 BPM | BODY MASS INDEX: 27.34 KG/M2 | SYSTOLIC BLOOD PRESSURE: 116 MMHG

## 2022-11-01 DIAGNOSIS — M79.7 FIBROMYALGIA: ICD-10-CM

## 2022-11-01 DIAGNOSIS — G93.32 CHRONIC FATIGUE SYNDROME: ICD-10-CM

## 2022-11-01 DIAGNOSIS — M06.00 SERONEGATIVE RHEUMATOID ARTHRITIS: Primary | ICD-10-CM

## 2022-11-01 DIAGNOSIS — D84.9 IMMUNOSUPPRESSION: ICD-10-CM

## 2022-11-01 PROCEDURE — 1159F PR MEDICATION LIST DOCUMENTED IN MEDICAL RECORD: ICD-10-PCS | Mod: CPTII,,, | Performed by: INTERNAL MEDICINE

## 2022-11-01 PROCEDURE — 1159F MED LIST DOCD IN RCRD: CPT | Mod: CPTII,,, | Performed by: INTERNAL MEDICINE

## 2022-11-01 PROCEDURE — 99214 OFFICE O/P EST MOD 30 MIN: CPT | Mod: S$PBB,,, | Performed by: INTERNAL MEDICINE

## 2022-11-01 PROCEDURE — 99999 PR PBB SHADOW E&M-EST. PATIENT-LVL III: ICD-10-PCS | Mod: PBBFAC,,, | Performed by: INTERNAL MEDICINE

## 2022-11-01 PROCEDURE — 99999 PR PBB SHADOW E&M-EST. PATIENT-LVL III: CPT | Mod: PBBFAC,,, | Performed by: INTERNAL MEDICINE

## 2022-11-01 PROCEDURE — 1160F RVW MEDS BY RX/DR IN RCRD: CPT | Mod: CPTII,,, | Performed by: INTERNAL MEDICINE

## 2022-11-01 PROCEDURE — 3078F DIAST BP <80 MM HG: CPT | Mod: CPTII,,, | Performed by: INTERNAL MEDICINE

## 2022-11-01 PROCEDURE — 99213 OFFICE O/P EST LOW 20 MIN: CPT | Mod: PBBFAC,PN | Performed by: INTERNAL MEDICINE

## 2022-11-01 PROCEDURE — 3074F PR MOST RECENT SYSTOLIC BLOOD PRESSURE < 130 MM HG: ICD-10-PCS | Mod: CPTII,,, | Performed by: INTERNAL MEDICINE

## 2022-11-01 PROCEDURE — 3008F BODY MASS INDEX DOCD: CPT | Mod: CPTII,,, | Performed by: INTERNAL MEDICINE

## 2022-11-01 PROCEDURE — 3008F PR BODY MASS INDEX (BMI) DOCUMENTED: ICD-10-PCS | Mod: CPTII,,, | Performed by: INTERNAL MEDICINE

## 2022-11-01 PROCEDURE — 1160F PR REVIEW ALL MEDS BY PRESCRIBER/CLIN PHARMACIST DOCUMENTED: ICD-10-PCS | Mod: CPTII,,, | Performed by: INTERNAL MEDICINE

## 2022-11-01 PROCEDURE — 3078F PR MOST RECENT DIASTOLIC BLOOD PRESSURE < 80 MM HG: ICD-10-PCS | Mod: CPTII,,, | Performed by: INTERNAL MEDICINE

## 2022-11-01 PROCEDURE — 3074F SYST BP LT 130 MM HG: CPT | Mod: CPTII,,, | Performed by: INTERNAL MEDICINE

## 2022-11-01 PROCEDURE — 99214 PR OFFICE/OUTPT VISIT, EST, LEVL IV, 30-39 MIN: ICD-10-PCS | Mod: S$PBB,,, | Performed by: INTERNAL MEDICINE

## 2022-11-01 RX ORDER — MELOXICAM 15 MG/1
15 TABLET ORAL DAILY
Qty: 30 TABLET | Refills: 6 | Status: SHIPPED | OUTPATIENT
Start: 2022-11-01 | End: 2023-09-06 | Stop reason: SDUPTHER

## 2022-11-01 ASSESSMENT — ROUTINE ASSESSMENT OF PATIENT INDEX DATA (RAPID3)
PSYCHOLOGICAL DISTRESS SCORE: 3.3
TOTAL RAPID3 SCORE: 3.89
FATIGUE SCORE: 1.1
PAIN SCORE: 5
MDHAQ FUNCTION SCORE: 1.1
PATIENT GLOBAL ASSESSMENT SCORE: 3

## 2022-11-01 NOTE — PROGRESS NOTES
Subjective:          Chief Complaint: Dorothy Jesus is a 42 y.o. female who had concerns including Disease Management.    HPI:    Seronegative Rheumatoid/ Undifferentiated connective tissue disease   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 No cardiac manifestations.     Positive LENA , elevation in her CRP normal sedimentation rate but did have benefit on prednisone.    trial of prednisone did show significant improvement in her overall symptomst   When she comes off prednisone everything seems to regress at her baseline. No rashes, some pain with respirations, no fevers but subjectively, fatigue in sun no rash.     Rapid  3   5/2022: 5.44  11/1/22: 3.89    Restarted Humira 6/2020-3/2021 stopped.   TB/Hepatitis updated 2/2021 negative.     Enbrel (4/2021)  Prednisone still 5mg daily only about 2 times weekly which is markedly improved.     Tizanidine helps with back and upper trapezius.   Affected joints: feet, knees and hands.       She failed HCQ with no response  MTX made her ill/nausea.  SSZ 2gm no benefit.   Tylenol as not helpful  Ibuprofen PRN more for menstrual cramps never found helpful for joints.       FMS:     She has increased her activity level trying to eat healthier.  In the past she states she was getting some relief of myalgias and arthralgias on ketoprofen.  He completed the vitamin D 12 weeks her psychiatrist has recently adjusted her medications slightly and this seems to be helping a great deal.  Interestingly she noted with the adjustment in her ADHD meds her myalgias have improved.Patient is a 34 y/o female referred for +LENA  1:80 homogeneous with repeat serologies + 1:320 negative LENA profile.  with myalgias, brain fog. Present for few years worse in past 2 years, affecting daily living/function. Patient did suffer from worsening of her symptoms after trying gabapentin, she did increase dose as we discussed but this only exacerbated her complaints. She did have worseing  "depression through the winter. Recent job did not work out for her, which was frustrating.  did recently get full time job.    Exacerbates with menstrual cycles. Patient has a non-restorative sleep pattern, no snoring. She has had sleep study-[place] negative for narcoplepsy. Patient was given Provigil exacerbated anxiety.    Recently with cognitive impairment, ++migraines (unable to see Dr. Yao at this time, cannot drive to [place]), extreme fatigue, dizzyness. She is more forgetful that typical.   Trying cataflam PRN migraine.      Patient was on lamictal for nearly 8 years d/c'd for suspected myalgias. Recently (few weeks ago) trialed lithium did not tolerate. Zyprexa, Geodon, topamax and risperdol. Wellbutrin has been the best medication for her as she continues to feel "human". She is having more myalgias, and some arthralgias some good days some bad days. She cannot see a pattern of morning stiffness.     Sister with UC  Mom recent dx RA.   Component      Latest Ref Rng & Units 4/13/2017 12/10/2015   Anti Sm Antibody      0.00 - 19.99 EU 0.35 0.43   Anti-Sm Interpretation      Negative Negative Negative   Anti-SSA Antibody      0.00 - 19.99 EU 0.97 1.62   Anti-SSA Interpretation      Negative Negative Negative   Anti-SSB Antibody      0.00 - 19.99 EU 0.35 0.00   Anti-SSB Interpretation      Negative Negative Negative   ds DNA Ab      Negative 1:10 Negative 1:10 Negative 1:10   Anti Sm/RNP Antibody      0.00 - 19.99 EU 0.79 0.86   Anti-Sm/RNP Interpretation      Negative Negative Negative   Complement (C-3)      50 - 180 mg/dL  108   Complement (C-4)      11 - 44 mg/dL  27   CPK      20 - 180 U/L  50   LENA Screen      Negative <1:160 Positive (A)    LENA HEP-2 Titer       Positive 1:320 Homogeneous        REVIEW OF SYSTEMS:    Review of Systems   Constitutional:  Positive for malaise/fatigue. Negative for fever and weight loss.   HENT:  Negative for sore throat.    Eyes:  Negative for double vision, " photophobia and redness.   Respiratory:  Negative for cough, shortness of breath and wheezing.    Cardiovascular:  Negative for chest pain, palpitations and orthopnea.   Gastrointestinal:  Negative for abdominal pain, constipation and diarrhea.   Genitourinary:  Negative for dysuria, hematuria and urgency.   Musculoskeletal:  Positive for joint pain and neck pain. Negative for back pain and myalgias.   Skin:  Negative for rash.   Neurological:  Negative for dizziness, tingling, focal weakness and headaches.   Endo/Heme/Allergies:  Does not bruise/bleed easily.   Psychiatric/Behavioral:  Negative for depression, hallucinations and suicidal ideas.              Objective:            Past Medical History:   Diagnosis Date    Anxiety     Bartholin's cyst     Bipolar 2 disorder     Chronic fatigue     Osteopenia      Family History   Problem Relation Age of Onset    Breast cancer Maternal Grandmother      Social History     Tobacco Use    Smoking status: Never    Smokeless tobacco: Never   Substance Use Topics    Alcohol use: No     Comment: rare    Drug use: No         Current Outpatient Medications on File Prior to Visit   Medication Sig Dispense Refill    benzonatate (TESSALON) 200 MG capsule Take 1 capsule (200 mg total) by mouth 3 (three) times daily as needed for Cough. 30 capsule 0    buPROPion (WELLBUTRIN XL) 300 MG 24 hr tablet Take 300 mg by mouth once daily.       clonazePAM (KLONOPIN) 0.5 MG tablet       dextroamphetamine-amphetamine (ADDERALL XR) 20 MG 24 hr capsule Take 20 mg by mouth every morning.       etanercept (ENBREL SURECLICK) 50 mg/mL (1 mL) Inject 1 mL (50 mg total) into the skin once a week. 4 mL 11    meloxicam (MOBIC) 15 MG tablet Take 1 tablet (15 mg total) by mouth once daily. 30 tablet 6    metoprolol succinate (TOPROL-XL) 25 MG 24 hr tablet TAKE ONE TABLET (25 MG TOTAL) BY MOUTH ONCE DAILY 90 tablet 3    predniSONE (DELTASONE) 5 MG tablet TAKE ONE TABLET (5MG) BY MOUTH ONCE DAILY 90 tablet 3     sertraline (ZOLOFT) 100 MG tablet Take 100 mg by mouth once daily.      tiZANidine (ZANAFLEX) 4 MG tablet TAKE ONE TABLET (4 MG TOTAL) BY MOUTH TWO TIMES A DAY AS NEEDED 60 tablet 6    traZODone (DESYREL) 50 MG tablet Take 50 mg by mouth nightly.      EPINEPHrine (EPIPEN) 0.3 mg/0.3 mL AtIn Inject 0.6 mLs (0.6 mg total) into the muscle once. for 1 dose 2 each 1     No current facility-administered medications on file prior to visit.       Vitals:    11/01/22 1522   BP: 116/78   Pulse: (!) 120       Physical Exam:    Physical Exam  Constitutional:       Appearance: She is well-developed.   HENT:      Head: Normocephalic and atraumatic.   Eyes:      General: Lids are normal.      Pupils: Pupils are equal, round, and reactive to light.   Cardiovascular:      Rate and Rhythm: Normal rate and regular rhythm.      Heart sounds: Normal heart sounds.   Pulmonary:      Effort: Pulmonary effort is normal.      Breath sounds: Normal breath sounds.   Musculoskeletal:      Right shoulder: Tenderness present. No swelling. Normal range of motion.      Left shoulder: Tenderness present. No swelling. Normal range of motion.      Right elbow: No swelling. Normal range of motion. No tenderness.      Left elbow: No swelling. Normal range of motion. No tenderness.      Right wrist: Tenderness present. No swelling. Normal range of motion.      Left wrist: Tenderness present. No swelling. Normal range of motion.      Right hand: Tenderness present. No swelling. Normal range of motion.      Left hand: Tenderness present. No swelling. Normal range of motion.      Cervical back: Normal range of motion.      Right knee: No swelling. Normal range of motion. No tenderness.      Left knee: No swelling. Normal range of motion. No tenderness.      Right foot: Normal range of motion. No swelling or tenderness.      Left foot: Normal range of motion. No swelling or tenderness.      Comments: 18 tender points examined in nine pairs: Posterior  occiput, bilateral trapezius, bilateral supraspinatus, bilateral gluteal muscles, bilateral low cervical neck, bilateral second rib, bilateral lateral epicondyles, bilateral greater trochanters, bilateral medial knees. 16 Of 18 points examined were positive for tenderness.      MCP and PIP tenderness with full but painful finger curl.      Skin:     General: Skin is warm and dry.   Neurological:      Mental Status: She is alert and oriented to person, place, and time.   Psychiatric:         Behavior: Behavior normal.         Thought Content: Thought content normal.             Assessment:       Encounter Diagnoses   Name Primary?    Seronegative rheumatoid arthritis Yes    Immunosuppression     Fibromyalgia     Chronic fatigue syndrome             Plan:        Seronegative rheumatoid arthritis  -     meloxicam (MOBIC) 15 MG tablet; Take 1 tablet (15 mg total) by mouth once daily.  Dispense: 30 tablet; Refill: 6  -     CBC Auto Differential; Standing; Expected date: 11/01/2022  -     Comprehensive Metabolic Panel; Standing; Expected date: 11/01/2022  -     Sedimentation rate; Standing; Expected date: 11/01/2022  -     C-Reactive Protein; Standing; Expected date: 11/01/2022    Immunosuppression  -     CBC Auto Differential; Standing; Expected date: 11/01/2022  -     Comprehensive Metabolic Panel; Standing; Expected date: 11/01/2022  -     Sedimentation rate; Standing; Expected date: 11/01/2022  -     C-Reactive Protein; Standing; Expected date: 11/01/2022    Fibromyalgia    Chronic fatigue syndrome      Continue Prednisone 5mg now using PRN approx 2/7 days out of the week  Continue w/  Enbrel 50mg sureclick. (start 4/2021)  Continue with Prednisone 5mg PRN  Continue with Meloxicam  Rapid 3 is down signficantly    No response with HCQ>   Intolerant MTX  SSZ tolerating but no significant difference when we held it for few weeks.      Continue Tizanidine      Follow up in about 5 months (around 4/1/2023).       30min  consultation with greater than 50% spent in counseling, chart review and coordination of care. All questions answered.

## 2022-11-10 ENCOUNTER — SPECIALTY PHARMACY (OUTPATIENT)
Dept: PHARMACY | Facility: CLINIC | Age: 42
End: 2022-11-10
Payer: MEDICAID

## 2022-11-10 NOTE — TELEPHONE ENCOUNTER
Specialty Pharmacy - Refill Coordination    Specialty Medication Orders Linked to Encounter      Flowsheet Row Most Recent Value   Medication #1 etanercept (ENBREL SURECLICK) 50 mg/mL (1 mL) (Order#[id number], Rx#[id number])            Refill Questions - Documented Responses      Flowsheet Row Most Recent Value   Patient Availability and HIPAA Verification    Does patient want to proceed with activity? Yes   HIPAA/medical authority confirmed? Yes   Relationship to patient of person spoken to? Self   Refill Screening Questions    Changes to allergies? No   Changes to medications? No   New conditions since last clinic visit? No   Unplanned office visit, urgent care, ED, or hospital admission in the last 4 weeks? No   How does patient/caregiver feel medication is working? Good   Financial problems or insurance changes? No   How many doses of your specialty medications were missed in the last 4 weeks? 0   Would patient like to speak to a pharmacist? No   When does the patient need to receive the medication? 11/16/22   Refill Delivery Questions    How will the patient receive the medication? MEDRx   When does the patient need to receive the medication? 11/16/22   Shipping Address Home   Address in [facility] confirmed and updated if neccessary? Yes   Expected Copay ($) 0   Is the patient able to afford the medication copay? Yes   Payment Method zero copay   Days supply of Refill 28   Supplies needed? No supplies needed   Refill activity completed? Yes   Refill activity plan Refill scheduled   Shipment/Pickup Date: 11/14/22            Current Outpatient Medications   Medication Sig    benzonatate (TESSALON) 200 MG capsule Take 1 capsule (200 mg total) by mouth 3 (three) times daily as needed for Cough.    buPROPion (WELLBUTRIN XL) 300 MG 24 hr tablet Take 300 mg by mouth once daily.     clonazePAM (KLONOPIN) 0.5 MG tablet     dextroamphetamine-amphetamine (ADDERALL XR) 20 MG 24 hr capsule Take 20 mg by mouth every  morning.     EPINEPHrine (EPIPEN) 0.3 mg/0.3 mL AtIn Inject 0.6 mLs (0.6 mg total) into the muscle once. for 1 dose    etanercept (ENBREL SURECLICK) 50 mg/mL (1 mL) Inject 1 mL (50 mg total) into the skin once a week.    meloxicam (MOBIC) 15 MG tablet Take 1 tablet (15 mg total) by mouth once daily.    metoprolol succinate (TOPROL-XL) 25 MG 24 hr tablet TAKE ONE TABLET (25 MG TOTAL) BY MOUTH ONCE DAILY    predniSONE (DELTASONE) 5 MG tablet TAKE ONE TABLET (5MG) BY MOUTH ONCE DAILY    sertraline (ZOLOFT) 100 MG tablet Take 100 mg by mouth once daily.    tiZANidine (ZANAFLEX) 4 MG tablet TAKE ONE TABLET (4 MG TOTAL) BY MOUTH TWO TIMES A DAY AS NEEDED    traZODone (DESYREL) 50 MG tablet Take 50 mg by mouth nightly.   Last reviewed on 11/1/2022  3:29 PM by Glory St, DO    Review of patient's allergies indicates:   Allergen Reactions    Gabapentin Other (See Comments)     Increased pain level    Lithium analogues Other (See Comments)     Suicidal thoughts    Nortriptyline Other (See Comments)     Paradoxical effect: sleepless, anxious and increased pain levels    Topamax [topiramate] Other (See Comments)     Lost vision    Pseudoephedrine Anxiety    Sudafed [pseudoephedrine hcl] Anxiety and Palpitations    Last reviewed on  11/1/2022 3:29 PM by Glory St      Tasks added this encounter   12/7/2022 - Refill Call (Auto Added)   Tasks due within next 3 months   No tasks due.     [facility]  [address] [place] [id number]  Phone: [phone]  Fax: [phone]

## 2022-12-07 ENCOUNTER — SPECIALTY PHARMACY (OUTPATIENT)
Dept: PHARMACY | Facility: CLINIC | Age: 42
End: 2022-12-07
Payer: MEDICAID

## 2022-12-07 NOTE — TELEPHONE ENCOUNTER
Outgoing call regarding enbrel refill; per pt, she's due to inject on 12/14; informed her that a PA is required, and once approved [facility] will follow up to schedule delivery; routed to [facility]

## 2022-12-08 NOTE — TELEPHONE ENCOUNTER
Enbrel apptoved from 12/8/22 - 12/8/23. Reference # [employer] [facility] [id number] BM. Proceed with refills.

## 2022-12-09 ENCOUNTER — SPECIALTY PHARMACY (OUTPATIENT)
Dept: PHARMACY | Facility: CLINIC | Age: 42
End: 2022-12-09
Payer: MEDICAID

## 2022-12-09 NOTE — TELEPHONE ENCOUNTER
Specialty Pharmacy - Refill Coordination    Specialty Medication Orders Linked to Encounter      Flowsheet Row Most Recent Value   Medication #1 etanercept (ENBREL SURECLICK) 50 mg/mL (1 mL) (Order#[id number], Rx#[id number])            Refill Questions - Documented Responses      Flowsheet Row Most Recent Value   Patient Availability and HIPAA Verification    Does patient want to proceed with activity? Yes   HIPAA/medical authority confirmed? Yes   Relationship to patient of person spoken to? Self   Refill Screening Questions    Changes to allergies? No   Changes to medications? No   New conditions since last clinic visit? No   Unplanned office visit, urgent care, ED, or hospital admission in the last 4 weeks? No   How does patient/caregiver feel medication is working? Good   Financial problems or insurance changes? No   How many doses of your specialty medications were missed in the last 4 weeks? 0   Would patient like to speak to a pharmacist? No   When does the patient need to receive the medication? 12/14/22   Refill Delivery Questions    How will the patient receive the medication? MEDRx   When does the patient need to receive the medication? 12/14/22   Shipping Address Home   Address in [facility] confirmed and updated if neccessary? Yes   Expected Copay ($) 0   Is the patient able to afford the medication copay? Yes   Payment Method zero copay   Days supply of Refill 28   Supplies needed? No supplies needed   Refill activity completed? Yes   Refill activity plan Refill scheduled   Shipment/Pickup Date: 12/12/22            Current Outpatient Medications   Medication Sig    benzonatate (TESSALON) 200 MG capsule Take 1 capsule (200 mg total) by mouth 3 (three) times daily as needed for Cough.    buPROPion (WELLBUTRIN XL) 300 MG 24 hr tablet Take 300 mg by mouth once daily.     clonazePAM (KLONOPIN) 0.5 MG tablet     dextroamphetamine-amphetamine (ADDERALL XR) 20 MG 24 hr capsule Take 20 mg by mouth every  morning.     EPINEPHrine (EPIPEN) 0.3 mg/0.3 mL AtIn Inject 0.6 mLs (0.6 mg total) into the muscle once. for 1 dose    etanercept (ENBREL SURECLICK) 50 mg/mL (1 mL) Inject 1 mL (50 mg total) into the skin once a week.    meloxicam (MOBIC) 15 MG tablet Take 1 tablet (15 mg total) by mouth once daily.    metoprolol succinate (TOPROL-XL) 25 MG 24 hr tablet TAKE ONE TABLET (25 MG TOTAL) BY MOUTH ONCE DAILY    predniSONE (DELTASONE) 5 MG tablet TAKE ONE TABLET (5MG) BY MOUTH ONCE DAILY    sertraline (ZOLOFT) 100 MG tablet Take 100 mg by mouth once daily.    tiZANidine (ZANAFLEX) 4 MG tablet TAKE ONE TABLET (4 MG TOTAL) BY MOUTH TWO TIMES A DAY AS NEEDED    traZODone (DESYREL) 50 MG tablet Take 50 mg by mouth nightly.   Last reviewed on 11/1/2022  3:29 PM by Glory St, DO    Review of patient's allergies indicates:   Allergen Reactions    Gabapentin Other (See Comments)     Increased pain level    Lithium analogues Other (See Comments)     Suicidal thoughts    Nortriptyline Other (See Comments)     Paradoxical effect: sleepless, anxious and increased pain levels    Topamax [topiramate] Other (See Comments)     Lost vision    Pseudoephedrine Anxiety    Sudafed [pseudoephedrine hcl] Anxiety and Palpitations    Last reviewed on  11/1/2022 3:29 PM by Glory St      Tasks added this encounter   1/4/2023 - Refill Call (Auto Added)   Tasks due within next 3 months   No tasks due.     [facility] Alvarez [facility] - [facility]  [address] [place] [id number]  Phone: [phone]  Fax: [phone]

## 2023-01-04 ENCOUNTER — SPECIALTY PHARMACY (OUTPATIENT)
Dept: PHARMACY | Facility: CLINIC | Age: 43
End: 2023-01-04
Payer: MEDICAID

## 2023-01-04 NOTE — TELEPHONE ENCOUNTER
Specialty Pharmacy - Refill Coordination    Specialty Medication Orders Linked to Encounter      Flowsheet Row Most Recent Value   Medication #1 etanercept (ENBREL SURECLICK) 50 mg/mL (1 mL) (Order#[id number], Rx#[id number])            Refill Questions - Documented Responses      Flowsheet Row Most Recent Value   Patient Availability and HIPAA Verification    Does patient want to proceed with activity? Yes   HIPAA/medical authority confirmed? Yes   Relationship to patient of person spoken to? Self   Refill Screening Questions    Changes to allergies? No   Changes to medications? No   New conditions since last clinic visit? No   Unplanned office visit, urgent care, ED, or hospital admission in the last 4 weeks? No   How does patient/caregiver feel medication is working? Good   Financial problems or insurance changes? No   How many doses of your specialty medications were missed in the last 4 weeks? 0   Would patient like to speak to a pharmacist? No   When does the patient need to receive the medication? 01/11/23   Refill Delivery Questions    How will the patient receive the medication? MEDRx   When does the patient need to receive the medication? 01/11/23   Shipping Address Home   Address in [facility] confirmed and updated if neccessary? Yes   Expected Copay ($) 0   Is the patient able to afford the medication copay? Yes   Payment Method zero copay   Days supply of Refill 28   Supplies needed? No supplies needed   Refill activity completed? Yes   Refill activity plan Refill scheduled   Shipment/Pickup Date: 01/09/23            Current Outpatient Medications   Medication Sig    benzonatate (TESSALON) 200 MG capsule Take 1 capsule (200 mg total) by mouth 3 (three) times daily as needed for Cough.    buPROPion (WELLBUTRIN XL) 300 MG 24 hr tablet Take 300 mg by mouth once daily.     clonazePAM (KLONOPIN) 0.5 MG tablet     dextroamphetamine-amphetamine (ADDERALL XR) 20 MG 24 hr capsule Take 20 mg by mouth every  morning.     EPINEPHrine (EPIPEN) 0.3 mg/0.3 mL AtIn Inject 0.6 mLs (0.6 mg total) into the muscle once. for 1 dose    etanercept (ENBREL SURECLICK) 50 mg/mL (1 mL) Inject 1 mL (50 mg total) into the skin once a week.    meloxicam (MOBIC) 15 MG tablet Take 1 tablet (15 mg total) by mouth once daily.    metoprolol succinate (TOPROL-XL) 25 MG 24 hr tablet TAKE ONE TABLET (25 MG TOTAL) BY MOUTH ONCE DAILY    predniSONE (DELTASONE) 5 MG tablet TAKE ONE TABLET (5MG) BY MOUTH ONCE DAILY    sertraline (ZOLOFT) 100 MG tablet Take 100 mg by mouth once daily.    tiZANidine (ZANAFLEX) 4 MG tablet TAKE ONE TABLET (4 MG TOTAL) BY MOUTH TWO TIMES A DAY AS NEEDED    traZODone (DESYREL) 50 MG tablet Take 50 mg by mouth nightly.   Last reviewed on 11/1/2022  3:29 PM by Glory St, DO    Review of patient's allergies indicates:   Allergen Reactions    Gabapentin Other (See Comments)     Increased pain level    Lithium analogues Other (See Comments)     Suicidal thoughts    Nortriptyline Other (See Comments)     Paradoxical effect: sleepless, anxious and increased pain levels    Topamax [topiramate] Other (See Comments)     Lost vision    Pseudoephedrine Anxiety    Sudafed [pseudoephedrine hcl] Anxiety and Palpitations    Last reviewed on  11/1/2022 3:29 PM by Glory St      Tasks added this encounter   2/1/2023 - Refill Call (Auto Added)   Tasks due within next 3 months   No tasks due.     [facility]  [address] [place] [id number]  Phone: [phone]  Fax: [phone]

## 2023-01-17 ENCOUNTER — PATIENT MESSAGE (OUTPATIENT)
Dept: ADMINISTRATIVE | Facility: HOSPITAL | Age: 43
End: 2023-01-17
Payer: MEDICAID

## 2023-02-01 ENCOUNTER — SPECIALTY PHARMACY (OUTPATIENT)
Dept: PHARMACY | Facility: CLINIC | Age: 43
End: 2023-02-01
Payer: MEDICAID

## 2023-02-01 NOTE — TELEPHONE ENCOUNTER
Specialty Pharmacy - Refill Coordination    Specialty Medication Orders Linked to Encounter      Flowsheet Row Most Recent Value   Medication #1 etanercept (ENBREL SURECLICK) 50 mg/mL (1 mL) (Order#[id number], Rx#[id number])            Refill Questions - Documented Responses      Flowsheet Row Most Recent Value   Patient Availability and HIPAA Verification    Does patient want to proceed with activity? Yes   HIPAA/medical authority confirmed? Yes   Relationship to patient of person spoken to? Self   Refill Screening Questions    Changes to allergies? No   Changes to medications? No   New conditions since last clinic visit? No   Unplanned office visit, urgent care, ED, or hospital admission in the last 4 weeks? No   How does patient/caregiver feel medication is working? Very good   Financial problems or insurance changes? No   How many doses of your specialty medications were missed in the last 4 weeks? 0   Would patient like to speak to a pharmacist? No   When does the patient need to receive the medication? 02/08/23   Refill Delivery Questions    How will the patient receive the medication? MEDRx   When does the patient need to receive the medication? 02/08/23   Shipping Address Home   Address in [facility] confirmed and updated if neccessary? Yes   Expected Copay ($) 0   Is the patient able to afford the medication copay? Yes   Payment Method zero copay   Days supply of Refill 28   Supplies needed? No supplies needed   Refill activity completed? Yes   Refill activity plan Refill scheduled   Shipment/Pickup Date: 02/06/23            Current Outpatient Medications   Medication Sig    benzonatate (TESSALON) 200 MG capsule Take 1 capsule (200 mg total) by mouth 3 (three) times daily as needed for Cough.    buPROPion (WELLBUTRIN XL) 300 MG 24 hr tablet Take 300 mg by mouth once daily.     clonazePAM (KLONOPIN) 0.5 MG tablet     dextroamphetamine-amphetamine (ADDERALL XR) 20 MG 24 hr capsule Take 20 mg by mouth  every morning.     EPINEPHrine (EPIPEN) 0.3 mg/0.3 mL AtIn Inject 0.6 mLs (0.6 mg total) into the muscle once. for 1 dose    etanercept (ENBREL SURECLICK) 50 mg/mL (1 mL) Inject 1 mL (50 mg total) into the skin once a week.    meloxicam (MOBIC) 15 MG tablet Take 1 tablet (15 mg total) by mouth once daily.    metoprolol succinate (TOPROL-XL) 25 MG 24 hr tablet TAKE ONE TABLET (25 MG TOTAL) BY MOUTH ONCE DAILY    predniSONE (DELTASONE) 5 MG tablet TAKE ONE TABLET (5MG) BY MOUTH ONCE DAILY    sertraline (ZOLOFT) 100 MG tablet Take 100 mg by mouth once daily.    tiZANidine (ZANAFLEX) 4 MG tablet TAKE ONE TABLET (4 MG TOTAL) BY MOUTH TWO TIMES A DAY AS NEEDED    traZODone (DESYREL) 50 MG tablet Take 50 mg by mouth nightly.   Last reviewed on 11/1/2022  3:29 PM by Glory St, DO    Review of patient's allergies indicates:   Allergen Reactions    Gabapentin Other (See Comments)     Increased pain level    Lithium analogues Other (See Comments)     Suicidal thoughts    Nortriptyline Other (See Comments)     Paradoxical effect: sleepless, anxious and increased pain levels    Topamax [topiramate] Other (See Comments)     Lost vision    Pseudoephedrine Anxiety    Sudafed [pseudoephedrine hcl] Anxiety and Palpitations    Last reviewed on  11/1/2022 3:29 PM by Glory St      Tasks added this encounter   3/1/2023 - Refill Call (Auto Added)   Tasks due within next 3 months   No tasks due.     Akila Helms  [facility]  [address] [place] [id number]  Phone: [phone]  Fax: [phone]

## 2023-03-06 ENCOUNTER — SPECIALTY PHARMACY (OUTPATIENT)
Dept: PHARMACY | Facility: CLINIC | Age: 43
End: 2023-03-06
Payer: MEDICAID

## 2023-03-06 NOTE — TELEPHONE ENCOUNTER
Specialty Pharmacy - Refill Coordination    Specialty Medication Orders Linked to Encounter      Flowsheet Row Most Recent Value   Medication #1 etanercept (ENBREL SURECLICK) 50 mg/mL (1 mL) (Order#[id number], Rx#[id number])            Refill Questions - Documented Responses      Flowsheet Row Most Recent Value   Patient Availability and HIPAA Verification    Does patient want to proceed with activity? Yes   HIPAA/medical authority confirmed? Yes   Relationship to patient of person spoken to? Self   Refill Screening Questions    Changes to allergies? No   Changes to medications? No   New conditions since last clinic visit? No   Unplanned office visit, urgent care, ED, or hospital admission in the last 4 weeks? No   How does patient/caregiver feel medication is working? Good   Financial problems or insurance changes? No   How many doses of your specialty medications were missed in the last 4 weeks? 0   Would patient like to speak to a pharmacist? No   When does the patient need to receive the medication? 03/08/23   Refill Delivery Questions    How will the patient receive the medication? MEDRx   When does the patient need to receive the medication? 03/08/23   Shipping Address Home   Address in [facility] confirmed and updated if neccessary? Yes   Expected Copay ($) 0   Is the patient able to afford the medication copay? Yes   Payment Method zero copay   Days supply of Refill 28   Supplies needed? No supplies needed   Refill activity completed? Yes   Refill activity plan Refill scheduled   Shipment/Pickup Date: 03/07/23            Current Outpatient Medications   Medication Sig    benzonatate (TESSALON) 200 MG capsule Take 1 capsule (200 mg total) by mouth 3 (three) times daily as needed for Cough.    buPROPion (WELLBUTRIN XL) 300 MG 24 hr tablet Take 300 mg by mouth once daily.     clonazePAM (KLONOPIN) 0.5 MG tablet     dextroamphetamine-amphetamine (ADDERALL XR) 20 MG 24 hr capsule Take 20 mg by mouth every  morning.     EPINEPHrine (EPIPEN) 0.3 mg/0.3 mL AtIn Inject 0.6 mLs (0.6 mg total) into the muscle once. for 1 dose    etanercept (ENBREL SURECLICK) 50 mg/mL (1 mL) Inject 1 mL (50 mg total) into the skin once a week.    meloxicam (MOBIC) 15 MG tablet Take 1 tablet (15 mg total) by mouth once daily.    metoprolol succinate (TOPROL-XL) 25 MG 24 hr tablet TAKE ONE TABLET (25 MG TOTAL) BY MOUTH ONCE DAILY    predniSONE (DELTASONE) 5 MG tablet TAKE ONE TABLET (5MG) BY MOUTH ONCE DAILY    sertraline (ZOLOFT) 100 MG tablet Take 100 mg by mouth once daily.    tiZANidine (ZANAFLEX) 4 MG tablet TAKE ONE TABLET (4 MG TOTAL) BY MOUTH TWO TIMES A DAY AS NEEDED    traZODone (DESYREL) 50 MG tablet Take 50 mg by mouth nightly.   Last reviewed on 11/1/2022  3:29 PM by Glory St, DO    Review of patient's allergies indicates:   Allergen Reactions    Gabapentin Other (See Comments)     Increased pain level    Lithium analogues Other (See Comments)     Suicidal thoughts    Nortriptyline Other (See Comments)     Paradoxical effect: sleepless, anxious and increased pain levels    Topamax [topiramate] Other (See Comments)     Lost vision    Pseudoephedrine Anxiety    Sudafed [pseudoephedrine hcl] Anxiety and Palpitations    Last reviewed on  11/1/2022 3:29 PM by Glory St      Tasks added this encounter   3/29/2023 - Refill Call (Auto Added)   Tasks due within next 3 months   No tasks due.     Pat Adams, Patient Care Assistant  [facility]  [address] [place] [id number]  Phone: [phone]  Fax: [phone]

## 2023-03-29 ENCOUNTER — SPECIALTY PHARMACY (OUTPATIENT)
Dept: PHARMACY | Facility: CLINIC | Age: 43
End: 2023-03-29
Payer: MEDICAID

## 2023-03-29 DIAGNOSIS — M06.00 SERONEGATIVE RHEUMATOID ARTHRITIS: ICD-10-CM

## 2023-03-29 RX ORDER — ETANERCEPT 50 MG/ML
50 SOLUTION SUBCUTANEOUS WEEKLY
Qty: 4 ML | Refills: 11 | Status: SHIPPED | OUTPATIENT
Start: 2023-03-29 | End: 2024-04-25

## 2023-03-29 NOTE — TELEPHONE ENCOUNTER
Outgoing call: Patient is due to inject on 4/5, I informed the patient that a refill request was sent to her doctor and once approved [facility] will follow up.

## 2023-04-01 ENCOUNTER — LAB VISIT (OUTPATIENT)
Dept: LAB | Facility: HOSPITAL | Age: 43
End: 2023-04-01
Attending: INTERNAL MEDICINE
Payer: MEDICAID

## 2023-04-01 DIAGNOSIS — M06.00 SERONEGATIVE RHEUMATOID ARTHRITIS: ICD-10-CM

## 2023-04-01 DIAGNOSIS — D84.9 IMMUNOSUPPRESSION: ICD-10-CM

## 2023-04-01 LAB
ALBUMIN SERPL BCP-MCNC: 4 G/DL (ref 3.5–5.2)
ALP SERPL-CCNC: 86 U/L (ref 55–135)
ALT SERPL W/O P-5'-P-CCNC: 18 U/L (ref 10–44)
ANION GAP SERPL CALC-SCNC: 10 MMOL/L (ref 8–16)
AST SERPL-CCNC: 18 U/L (ref 10–40)
BASOPHILS # BLD AUTO: 0.06 K/UL (ref 0–0.2)
BASOPHILS NFR BLD: 0.6 % (ref 0–1.9)
BILIRUB SERPL-MCNC: 0.4 MG/DL (ref 0.1–1)
BUN SERPL-MCNC: 9 MG/DL (ref 6–20)
CALCIUM SERPL-MCNC: 8.8 MG/DL (ref 8.7–10.5)
CHLORIDE SERPL-SCNC: 106 MMOL/L (ref 95–110)
CO2 SERPL-SCNC: 22 MMOL/L (ref 23–29)
CREAT SERPL-MCNC: 1 MG/DL (ref 0.5–1.4)
CRP SERPL-MCNC: 2.2 MG/L (ref 0–8.2)
DIFFERENTIAL METHOD: ABNORMAL
EOSINOPHIL # BLD AUTO: 0.1 K/UL (ref 0–0.5)
EOSINOPHIL NFR BLD: 0.8 % (ref 0–8)
ERYTHROCYTE [DISTWIDTH] IN BLOOD BY AUTOMATED COUNT: 12.5 % (ref 11.5–14.5)
ERYTHROCYTE [SEDIMENTATION RATE] IN BLOOD BY WESTERGREN METHOD: 15 MM/HR (ref 0–20)
EST. GFR  (NO RACE VARIABLE): >60 ML/MIN/1.73 M^2
GLUCOSE SERPL-MCNC: 108 MG/DL (ref 70–110)
HCT VFR BLD AUTO: 40.8 % (ref 37–48.5)
HGB BLD-MCNC: 14.1 G/DL (ref 12–16)
IMM GRANULOCYTES # BLD AUTO: 0.05 K/UL (ref 0–0.04)
IMM GRANULOCYTES NFR BLD AUTO: 0.5 % (ref 0–0.5)
LYMPHOCYTES # BLD AUTO: 1.8 K/UL (ref 1–4.8)
LYMPHOCYTES NFR BLD: 17.6 % (ref 18–48)
MCH RBC QN AUTO: 31.5 PG (ref 27–31)
MCHC RBC AUTO-ENTMCNC: 34.6 G/DL (ref 32–36)
MCV RBC AUTO: 91 FL (ref 82–98)
MONOCYTES # BLD AUTO: 0.4 K/UL (ref 0.3–1)
MONOCYTES NFR BLD: 3.8 % (ref 4–15)
NEUTROPHILS # BLD AUTO: 7.7 K/UL (ref 1.8–7.7)
NEUTROPHILS NFR BLD: 76.7 % (ref 38–73)
NRBC BLD-RTO: 0 /100 WBC
PLATELET # BLD AUTO: 231 K/UL (ref 150–450)
PMV BLD AUTO: 11.5 FL (ref 9.2–12.9)
POTASSIUM SERPL-SCNC: 3.8 MMOL/L (ref 3.5–5.1)
PROT SERPL-MCNC: 7.2 G/DL (ref 6–8.4)
RBC # BLD AUTO: 4.47 M/UL (ref 4–5.4)
SODIUM SERPL-SCNC: 138 MMOL/L (ref 136–145)
WBC # BLD AUTO: 10.01 K/UL (ref 3.9–12.7)

## 2023-04-01 PROCEDURE — 85651 RBC SED RATE NONAUTOMATED: CPT | Performed by: INTERNAL MEDICINE

## 2023-04-01 PROCEDURE — 85025 COMPLETE CBC W/AUTO DIFF WBC: CPT | Performed by: INTERNAL MEDICINE

## 2023-04-01 PROCEDURE — 36415 COLL VENOUS BLD VENIPUNCTURE: CPT | Performed by: INTERNAL MEDICINE

## 2023-04-01 PROCEDURE — 86140 C-REACTIVE PROTEIN: CPT | Performed by: INTERNAL MEDICINE

## 2023-04-01 PROCEDURE — 80053 COMPREHEN METABOLIC PANEL: CPT | Performed by: INTERNAL MEDICINE

## 2023-04-03 ENCOUNTER — OFFICE VISIT (OUTPATIENT)
Dept: RHEUMATOLOGY | Facility: CLINIC | Age: 43
End: 2023-04-03
Payer: MEDICAID

## 2023-04-03 VITALS
SYSTOLIC BLOOD PRESSURE: 102 MMHG | HEIGHT: 62 IN | WEIGHT: 148.56 LBS | DIASTOLIC BLOOD PRESSURE: 67 MMHG | BODY MASS INDEX: 27.34 KG/M2 | HEART RATE: 84 BPM

## 2023-04-03 DIAGNOSIS — Z79.899 HIGH RISK MEDICATIONS (NOT ANTICOAGULANTS) LONG-TERM USE: ICD-10-CM

## 2023-04-03 DIAGNOSIS — M06.00 SERONEGATIVE RHEUMATOID ARTHRITIS: Primary | ICD-10-CM

## 2023-04-03 DIAGNOSIS — K29.70 GASTRITIS WITHOUT BLEEDING, UNSPECIFIED CHRONICITY, UNSPECIFIED GASTRITIS TYPE: ICD-10-CM

## 2023-04-03 DIAGNOSIS — D84.9 IMMUNOSUPPRESSION: ICD-10-CM

## 2023-04-03 DIAGNOSIS — M79.7 FIBROMYALGIA: ICD-10-CM

## 2023-04-03 DIAGNOSIS — G93.32 CHRONIC FATIGUE SYNDROME: ICD-10-CM

## 2023-04-03 PROCEDURE — 1160F RVW MEDS BY RX/DR IN RCRD: CPT | Mod: CPTII,,, | Performed by: INTERNAL MEDICINE

## 2023-04-03 PROCEDURE — 99999 PR PBB SHADOW E&M-EST. PATIENT-LVL III: ICD-10-PCS | Mod: PBBFAC,,, | Performed by: INTERNAL MEDICINE

## 2023-04-03 PROCEDURE — 1159F PR MEDICATION LIST DOCUMENTED IN MEDICAL RECORD: ICD-10-PCS | Mod: CPTII,,, | Performed by: INTERNAL MEDICINE

## 2023-04-03 PROCEDURE — 99213 OFFICE O/P EST LOW 20 MIN: CPT | Mod: PBBFAC,PN | Performed by: INTERNAL MEDICINE

## 2023-04-03 PROCEDURE — 3008F PR BODY MASS INDEX (BMI) DOCUMENTED: ICD-10-PCS | Mod: CPTII,,, | Performed by: INTERNAL MEDICINE

## 2023-04-03 PROCEDURE — 3074F PR MOST RECENT SYSTOLIC BLOOD PRESSURE < 130 MM HG: ICD-10-PCS | Mod: CPTII,,, | Performed by: INTERNAL MEDICINE

## 2023-04-03 PROCEDURE — 96372 THER/PROPH/DIAG INJ SC/IM: CPT | Mod: PBBFAC,PN

## 2023-04-03 PROCEDURE — 3078F DIAST BP <80 MM HG: CPT | Mod: CPTII,,, | Performed by: INTERNAL MEDICINE

## 2023-04-03 PROCEDURE — 1160F PR REVIEW ALL MEDS BY PRESCRIBER/CLIN PHARMACIST DOCUMENTED: ICD-10-PCS | Mod: CPTII,,, | Performed by: INTERNAL MEDICINE

## 2023-04-03 PROCEDURE — 3078F PR MOST RECENT DIASTOLIC BLOOD PRESSURE < 80 MM HG: ICD-10-PCS | Mod: CPTII,,, | Performed by: INTERNAL MEDICINE

## 2023-04-03 PROCEDURE — 99215 OFFICE O/P EST HI 40 MIN: CPT | Mod: S$PBB,,, | Performed by: INTERNAL MEDICINE

## 2023-04-03 PROCEDURE — 99215 PR OFFICE/OUTPT VISIT, EST, LEVL V, 40-54 MIN: ICD-10-PCS | Mod: S$PBB,,, | Performed by: INTERNAL MEDICINE

## 2023-04-03 PROCEDURE — 3074F SYST BP LT 130 MM HG: CPT | Mod: CPTII,,, | Performed by: INTERNAL MEDICINE

## 2023-04-03 PROCEDURE — 1159F MED LIST DOCD IN RCRD: CPT | Mod: CPTII,,, | Performed by: INTERNAL MEDICINE

## 2023-04-03 PROCEDURE — 3008F BODY MASS INDEX DOCD: CPT | Mod: CPTII,,, | Performed by: INTERNAL MEDICINE

## 2023-04-03 PROCEDURE — 99999 PR PBB SHADOW E&M-EST. PATIENT-LVL III: CPT | Mod: PBBFAC,,, | Performed by: INTERNAL MEDICINE

## 2023-04-03 RX ORDER — METHYLPREDNISOLONE ACETATE 80 MG/ML
80 INJECTION, SUSPENSION INTRA-ARTICULAR; INTRALESIONAL; INTRAMUSCULAR; SOFT TISSUE
Status: COMPLETED | OUTPATIENT
Start: 2023-04-03 | End: 2023-04-03

## 2023-04-03 RX ORDER — OMEPRAZOLE 20 MG/1
20 CAPSULE, DELAYED RELEASE ORAL DAILY
Qty: 30 CAPSULE | Refills: 11 | Status: SHIPPED | OUTPATIENT
Start: 2023-04-03 | End: 2024-01-17 | Stop reason: SDUPTHER

## 2023-04-03 RX ADMIN — METHYLPREDNISOLONE ACETATE 80 MG: 80 INJECTION, SUSPENSION INTRA-ARTICULAR; INTRALESIONAL; INTRAMUSCULAR; SOFT TISSUE at 11:04

## 2023-04-03 ASSESSMENT — ROUTINE ASSESSMENT OF PATIENT INDEX DATA (RAPID3)
FATIGUE SCORE: 2.2
PATIENT GLOBAL ASSESSMENT SCORE: 6.5
PAIN SCORE: 5.5
TOTAL RAPID3 SCORE: 5.44
MDHAQ FUNCTION SCORE: 1.3
PSYCHOLOGICAL DISTRESS SCORE: 1.1

## 2023-04-03 NOTE — PROGRESS NOTES
Subjective:          Chief Complaint: Dorothy Jesus is a 42 y.o. female who had concerns including Disease Management.    HPI:    Seronegative Rheumatoid/ Undifferentiated connective tissue disease   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 No cardiac manifestations.     Positive LENA , elevation in her CRP normal sedimentation rate but did have benefit on prednisone.    trial of prednisone did show significant improvement in her overall symptomst   When she comes off prednisone everything seems to regress at her baseline. No rashes, some pain with respirations, no fevers but subjectively, fatigue in sun no rash.     Patient with hand and ankle pain as of last few weeks but also more widespread MSK complaints and fatigue as well. Using Prednisone with modest improvement.   Rapid  3   5/2022: 5.44  11/1/22: 3.89  3/2023: 5.44    Restarted Humira 6/2020-3/2021 stopped.   TB/Hepatitis updated 2/2021 negative.     Enbrel (4/2021)  Prednisone still 5mg daily only about 2 times weekly which is markedly improved.     Tizanidine helps with back and upper trapezius.   Affected joints: feet, knees and hands.       She failed HCQ with no response  MTX made her ill/nausea.  SSZ 2gm no benefit.   Tylenol as not helpful  Ibuprofen PRN more for menstrual cramps never found helpful for joints.       FMS:     She has increased her activity level trying to eat healthier.  In the past she states she was getting some relief of myalgias and arthralgias on ketoprofen.  He completed the vitamin D 12 weeks her psychiatrist has recently adjusted her medications slightly and this seems to be helping a great deal.  Interestingly she noted with the adjustment in her ADHD meds her myalgias have improved.Patient is a 36 y/o female referred for +LENA  1:80 homogeneous with repeat serologies + 1:320 negative LENA profile.  with myalgias, brain fog. Present for few years worse in past 2 years, affecting daily living/function. Patient did  "suffer from worsening of her symptoms after trying gabapentin, she did increase dose as we discussed but this only exacerbated her complaints. She did have worseing depression through the winter. Recent job did not work out for her, which was frustrating.  did recently get full time job.    Exacerbates with menstrual cycles. Patient has a non-restorative sleep pattern, no snoring. She has had sleep study-[place] negative for narcoplepsy. Patient was given Provigil exacerbated anxiety.    Recently with cognitive impairment, ++migraines (unable to see Dr. Yao at this time, cannot drive to [facility]), extreme fatigue, dizzyness. She is more forgetful that typical.   Trying cataflam PRN migraine.      Patient was on lamictal for nearly 8 years d/c'd for suspected myalgias. Recently (few weeks ago) trialed lithium did not tolerate. Zyprexa, Geodon, topamax and risperdol. Wellbutrin has been the best medication for her as she continues to feel "human". She is having more myalgias, and some arthralgias some good days some bad days. She cannot see a pattern of morning stiffness.     Sister with UC  Mom recent dx RA.   Component      Latest Ref Rng & Units 4/13/2017 12/10/2015   Anti Sm Antibody      0.00 - 19.99 EU 0.35 0.43   Anti-Sm Interpretation      Negative Negative Negative   Anti-SSA Antibody      0.00 - 19.99 EU 0.97 1.62   Anti-SSA Interpretation      Negative Negative Negative   Anti-SSB Antibody      0.00 - 19.99 EU 0.35 0.00   Anti-SSB Interpretation      Negative Negative Negative   ds DNA Ab      Negative 1:10 Negative 1:10 Negative 1:10   Anti Sm/RNP Antibody      0.00 - 19.99 EU 0.79 0.86   Anti-Sm/RNP Interpretation      Negative Negative Negative   Complement (C-3)      50 - 180 mg/dL  108   Complement (C-4)      11 - 44 mg/dL  27   CPK      20 - 180 U/L  50   LENA Screen      Negative <1:160 Positive (A)    LENA HEP-2 Titer       Positive 1:320 Homogeneous        REVIEW OF SYSTEMS:    Review of " Systems   Constitutional:  Positive for malaise/fatigue. Negative for fever and weight loss.   HENT:  Negative for sore throat.    Eyes:  Negative for double vision, photophobia and redness.   Respiratory:  Negative for cough, shortness of breath and wheezing.    Cardiovascular:  Negative for chest pain, palpitations and orthopnea.   Gastrointestinal:  Negative for abdominal pain, constipation and diarrhea.   Genitourinary:  Negative for dysuria, hematuria and urgency.   Musculoskeletal:  Positive for joint pain and neck pain. Negative for back pain and myalgias.   Skin:  Negative for rash.   Neurological:  Negative for dizziness, tingling, focal weakness and headaches.   Endo/Heme/Allergies:  Does not bruise/bleed easily.   Psychiatric/Behavioral:  Negative for depression, hallucinations and suicidal ideas.              Objective:            Past Medical History:   Diagnosis Date    Anxiety     Bartholin's cyst     Bipolar 2 disorder     Chronic fatigue     Osteopenia      Family History   Problem Relation Age of Onset    Breast cancer Maternal Grandmother      Social History     Tobacco Use    Smoking status: Never    Smokeless tobacco: Never   Substance Use Topics    Alcohol use: No     Comment: rare    Drug use: No         Current Outpatient Medications on File Prior to Visit   Medication Sig Dispense Refill    buPROPion (WELLBUTRIN XL) 300 MG 24 hr tablet Take 300 mg by mouth once daily.       clonazePAM (KLONOPIN) 0.5 MG tablet       dextroamphetamine-amphetamine (ADDERALL XR) 20 MG 24 hr capsule Take 20 mg by mouth every morning.       etanercept (ENBREL SURECLICK) 50 mg/mL (1 mL) Inject 1 mL (50 mg total) into the skin once a week. 4 mL 11    meloxicam (MOBIC) 15 MG tablet Take 1 tablet (15 mg total) by mouth once daily. 30 tablet 6    metoprolol succinate (TOPROL-XL) 25 MG 24 hr tablet TAKE ONE TABLET (25 MG TOTAL) BY MOUTH ONCE DAILY 90 tablet 3    predniSONE (DELTASONE) 5 MG tablet TAKE ONE TABLET (5MG)  BY MOUTH ONCE DAILY 90 tablet 3    sertraline (ZOLOFT) 100 MG tablet Take 100 mg by mouth once daily.      tiZANidine (ZANAFLEX) 4 MG tablet TAKE ONE TABLET (4 MG TOTAL) BY MOUTH TWO TIMES A DAY AS NEEDED 60 tablet 6    traZODone (DESYREL) 50 MG tablet Take 50 mg by mouth nightly.      benzonatate (TESSALON) 200 MG capsule Take 1 capsule (200 mg total) by mouth 3 (three) times daily as needed for Cough. (Patient not taking: Reported on 4/3/2023) 30 capsule 0    EPINEPHrine (EPIPEN) 0.3 mg/0.3 mL AtIn Inject 0.6 mLs (0.6 mg total) into the muscle once. for 1 dose 2 each 1     No current facility-administered medications on file prior to visit.       Vitals:    04/03/23 1040   BP: 102/67   Pulse: 84       Physical Exam:    Physical Exam  Constitutional:       Appearance: She is well-developed.   HENT:      Head: Normocephalic and atraumatic.   Eyes:      General: Lids are normal.      Pupils: Pupils are equal, round, and reactive to light.   Cardiovascular:      Rate and Rhythm: Normal rate and regular rhythm.      Heart sounds: Normal heart sounds.   Pulmonary:      Effort: Pulmonary effort is normal.      Breath sounds: Normal breath sounds.   Musculoskeletal:      Right shoulder: Tenderness present. No swelling. Normal range of motion.      Left shoulder: Tenderness present. No swelling. Normal range of motion.      Right elbow: No swelling. Normal range of motion. No tenderness.      Left elbow: No swelling. Normal range of motion. No tenderness.      Right wrist: Tenderness present. No swelling. Normal range of motion.      Left wrist: Tenderness present. No swelling. Normal range of motion.      Right hand: Tenderness present. No swelling. Normal range of motion.      Left hand: Tenderness present. No swelling. Normal range of motion.      Cervical back: Normal range of motion.      Right knee: No swelling. Normal range of motion. No tenderness.      Left knee: No swelling. Normal range of motion. No tenderness.       Right foot: Normal range of motion. No swelling or tenderness.      Left foot: Normal range of motion. No swelling or tenderness.      Comments: 18 tender points examined in nine pairs: Posterior occiput, bilateral trapezius, bilateral supraspinatus, bilateral gluteal muscles, bilateral low cervical neck, bilateral second rib, bilateral lateral epicondyles, bilateral greater trochanters, bilateral medial knees. 16 Of 18 points examined were positive for tenderness.      MCP and PIP tenderness with full but painful finger curl.      Skin:     General: Skin is warm and dry.   Neurological:      Mental Status: She is alert and oriented to person, place, and time.   Psychiatric:         Behavior: Behavior normal.         Thought Content: Thought content normal.             Assessment:       Encounter Diagnoses   Name Primary?    Seronegative rheumatoid arthritis Yes    Fibromyalgia     Chronic fatigue syndrome     Gastritis without bleeding, unspecified chronicity, unspecified gastritis type     Immunosuppression     High risk medications (not anticoagulants) long-term use               Plan:        Seronegative rheumatoid arthritis  -     methylPREDNISolone acetate injection 80 mg  -     CBC Auto Differential; Standing; Expected date: 04/03/2023  -     Comprehensive Metabolic Panel; Standing; Expected date: 04/03/2023  -     Sedimentation rate; Standing; Expected date: 04/03/2023  -     C-Reactive Protein; Standing; Expected date: 04/03/2023    Fibromyalgia    Chronic fatigue syndrome    Gastritis without bleeding, unspecified chronicity, unspecified gastritis type  -     omeprazole (PRILOSEC) 20 MG capsule; Take 1 capsule (20 mg total) by mouth once daily.  Dispense: 30 capsule; Refill: 11    Immunosuppression  -     CBC Auto Differential; Standing; Expected date: 04/03/2023  -     Comprehensive Metabolic Panel; Standing; Expected date: 04/03/2023  -     Sedimentation rate; Standing; Expected date: 04/03/2023  -   "   C-Reactive Protein; Standing; Expected date: 04/03/2023    High risk medications (not anticoagulants) long-term use  -     CBC Auto Differential; Standing; Expected date: 04/03/2023  -     Comprehensive Metabolic Panel; Standing; Expected date: 04/03/2023  -     Sedimentation rate; Standing; Expected date: 04/03/2023  -     C-Reactive Protein; Standing; Expected date: 04/03/2023          Continue Prednisone 5mg now using PRN approx 5/7 days out of the week  Continue w/  Enbrel 50mg sureclick. (start 4/2021)  Continue with Prednisone 5mg PRN  Continue with Meloxicam- only using "sometimes"   Rapid 3 is back up with admitted flare per patient- trial with Depomedrol 80mg IM as labs look good this could be multifactorial.   Adding pe  No response with HCQ>   Intolerant MTX  SSZ tolerating but no significant difference when we held it for few weeks.  - off.     Continue Tizanidine      No follow-ups on file.       40min consultation with greater than 50% of that time included Preparing to see the patient (review records, tests), Obtaining and/or reviewing separately obtained historical data, Performing a medically appropriate examination and/or evaluation , Ordering medications, tests, and/or procedures, Referring and communicating with other healthcare professionals , Documenting clinical information in the electronic or other health record and Independently interpreting results  (as warranted) & communicating results to the patient/family/caregiver. All questions answered.                        "

## 2023-04-03 NOTE — TELEPHONE ENCOUNTER
Specialty Pharmacy - Refill Coordination    Specialty Medication Orders Linked to Encounter      Flowsheet Row Most Recent Value   Medication #1 etanercept (ENBREL SURECLICK) 50 mg/mL (1 mL) (Order#[id number], Rx#[id number])            Refill Questions - Documented Responses      Flowsheet Row Most Recent Value   Patient Availability and HIPAA Verification    Does patient want to proceed with activity? Yes   HIPAA/medical authority confirmed? Yes   Relationship to patient of person spoken to? Self   Refill Screening Questions    Changes to allergies? No   Changes to medications? No   New conditions since last clinic visit? No   Unplanned office visit, urgent care, ED, or hospital admission in the last 4 weeks? No   How does patient/caregiver feel medication is working? Good   Financial problems or insurance changes? No   How many doses of your specialty medications were missed in the last 4 weeks? 0   Would patient like to speak to a pharmacist? No   When does the patient need to receive the medication? 04/06/23   Refill Delivery Questions    How will the patient receive the medication? MEDRx   When does the patient need to receive the medication? 04/06/23   Shipping Address Home   Address in [facility] confirmed and updated if neccessary? Yes   Expected Copay ($) 0   Is the patient able to afford the medication copay? Yes   Payment Method zero copay   Days supply of Refill 28   Supplies needed? No supplies needed   Refill activity completed? Yes   Refill activity plan Refill scheduled   Shipment/Pickup Date: 04/04/23            Current Outpatient Medications   Medication Sig    benzonatate (TESSALON) 200 MG capsule Take 1 capsule (200 mg total) by mouth 3 (three) times daily as needed for Cough. (Patient not taking: Reported on 4/3/2023)    buPROPion (WELLBUTRIN XL) 300 MG 24 hr tablet Take 300 mg by mouth once daily.     clonazePAM (KLONOPIN) 0.5 MG tablet     dextroamphetamine-amphetamine (ADDERALL XR) 20  MG 24 hr capsule Take 20 mg by mouth every morning.     EPINEPHrine (EPIPEN) 0.3 mg/0.3 mL AtIn Inject 0.6 mLs (0.6 mg total) into the muscle once. for 1 dose    etanercept (ENBREL SURECLICK) 50 mg/mL (1 mL) Inject 1 mL (50 mg total) into the skin once a week.    meloxicam (MOBIC) 15 MG tablet Take 1 tablet (15 mg total) by mouth once daily.    metoprolol succinate (TOPROL-XL) 25 MG 24 hr tablet TAKE ONE TABLET (25 MG TOTAL) BY MOUTH ONCE DAILY    omeprazole (PRILOSEC) 20 MG capsule Take 1 capsule (20 mg total) by mouth once daily.    predniSONE (DELTASONE) 5 MG tablet TAKE ONE TABLET (5MG) BY MOUTH ONCE DAILY    sertraline (ZOLOFT) 100 MG tablet Take 100 mg by mouth once daily.    tiZANidine (ZANAFLEX) 4 MG tablet TAKE ONE TABLET (4 MG TOTAL) BY MOUTH TWO TIMES A DAY AS NEEDED    traZODone (DESYREL) 50 MG tablet Take 50 mg by mouth nightly.   Last reviewed on 4/3/2023 11:30 AM by Glory St, DO    Review of patient's allergies indicates:   Allergen Reactions    Gabapentin Other (See Comments)     Increased pain level    Lithium analogues Other (See Comments)     Suicidal thoughts    Nortriptyline Other (See Comments)     Paradoxical effect: sleepless, anxious and increased pain levels    Topamax [topiramate] Other (See Comments)     Lost vision    Pseudoephedrine Anxiety    Sudafed [pseudoephedrine hcl] Anxiety and Palpitations    Last reviewed on  4/3/2023 11:30 AM by Glory St      Tasks added this encounter   4/27/2023 - Refill Call (Auto Added)   Tasks due within next 3 months   No tasks due.     Akila Helms  [facility]  [address] [facility] [address]  Phone: [phone]  Fax: [phone]

## 2023-04-11 ENCOUNTER — PATIENT MESSAGE (OUTPATIENT)
Dept: ADMINISTRATIVE | Facility: HOSPITAL | Age: 43
End: 2023-04-11
Payer: MEDICAID

## 2023-05-04 ENCOUNTER — SPECIALTY PHARMACY (OUTPATIENT)
Dept: PHARMACY | Facility: CLINIC | Age: 43
End: 2023-05-04
Payer: MEDICAID

## 2023-05-04 NOTE — TELEPHONE ENCOUNTER
Specialty Pharmacy - Refill Coordination    Specialty Medication Orders Linked to Encounter      Flowsheet Row Most Recent Value   Medication #1 etanercept (ENBREL SURECLICK) 50 mg/mL (1 mL) (Order#[id number], Rx#[id number])            Refill Questions - Documented Responses      Flowsheet Row Most Recent Value   Refill Screening Questions    Changes to allergies? No   Changes to medications? No   New conditions since last clinic visit? No   Unplanned office visit, urgent care, ED, or hospital admission in the last 4 weeks? No   How does patient/caregiver feel medication is working? Good   Financial problems or insurance changes? No   How many doses of your specialty medications were missed in the last 4 weeks? 1  [Pt due for dose on 5/6/23 but aware it is past the cut off time for next day delivery. Can have med our for Monday, 5/8/23 delivery and instructed to start a new weekly schedule for Mondays. Pt rep verbalized understanding and no further questions.]   Would patient like to speak to a pharmacist? No   When does the patient need to receive the medication? 05/08/23   Refill Delivery Questions    How will the patient receive the medication? MEDRx   When does the patient need to receive the medication? 05/08/23   Shipping Address Home   Address in [facility] confirmed and updated if neccessary? Yes   Expected Copay ($) 0   Is the patient able to afford the medication copay? Yes   Payment Method zero copay   Days supply of Refill 28   Supplies needed? No supplies needed   Refill activity completed? Yes   Refill activity plan Refill scheduled   Shipment/Pickup Date: 05/05/23            Current Outpatient Medications   Medication Sig    benzonatate (TESSALON) 200 MG capsule Take 1 capsule (200 mg total) by mouth 3 (three) times daily as needed for Cough. (Patient not taking: Reported on 4/3/2023)    buPROPion (WELLBUTRIN XL) 300 MG 24 hr tablet Take 300 mg by mouth once daily.     clonazePAM (KLONOPIN) 0.5  MG tablet     dextroamphetamine-amphetamine (ADDERALL XR) 20 MG 24 hr capsule Take 20 mg by mouth every morning.     EPINEPHrine (EPIPEN) 0.3 mg/0.3 mL AtIn Inject 0.6 mLs (0.6 mg total) into the muscle once. for 1 dose    etanercept (ENBREL SURECLICK) 50 mg/mL (1 mL) Inject 1 mL (50 mg total) into the skin once a week.    meloxicam (MOBIC) 15 MG tablet Take 1 tablet (15 mg total) by mouth once daily.    metoprolol succinate (TOPROL-XL) 25 MG 24 hr tablet TAKE ONE TABLET (25 MG TOTAL) BY MOUTH ONCE DAILY    omeprazole (PRILOSEC) 20 MG capsule Take 1 capsule (20 mg total) by mouth once daily.    predniSONE (DELTASONE) 5 MG tablet TAKE ONE TABLET (5MG) BY MOUTH ONCE DAILY    sertraline (ZOLOFT) 100 MG tablet Take 100 mg by mouth once daily.    tiZANidine (ZANAFLEX) 4 MG tablet TAKE ONE TABLET (4 MG TOTAL) BY MOUTH TWO TIMES A DAY AS NEEDED    traZODone (DESYREL) 50 MG tablet Take 50 mg by mouth nightly.   Last reviewed on 4/3/2023 11:30 AM by Glory St, DO    Review of patient's allergies indicates:   Allergen Reactions    Gabapentin Other (See Comments)     Increased pain level    Lithium analogues Other (See Comments)     Suicidal thoughts    Nortriptyline Other (See Comments)     Paradoxical effect: sleepless, anxious and increased pain levels    Topamax [topiramate] Other (See Comments)     Lost vision    Pseudoephedrine Anxiety    Sudafed [pseudoephedrine hcl] Anxiety and Palpitations    Last reviewed on  4/3/2023 11:30 AM by Glory St      Tasks added this encounter   No tasks added.   Tasks due within next 3 months   No tasks due.     Magy Arnold, PharmD  [facility]  [address] [place] [id number]  Phone: [phone]  Fax: [phone]

## 2023-05-30 ENCOUNTER — SPECIALTY PHARMACY (OUTPATIENT)
Dept: PHARMACY | Facility: CLINIC | Age: 43
End: 2023-05-30
Payer: MEDICAID

## 2023-06-21 ENCOUNTER — PATIENT MESSAGE (OUTPATIENT)
Dept: PHARMACY | Facility: CLINIC | Age: 43
End: 2023-06-21
Payer: MEDICAID

## 2023-06-27 ENCOUNTER — PATIENT MESSAGE (OUTPATIENT)
Dept: PHARMACY | Facility: CLINIC | Age: 43
End: 2023-06-27
Payer: MEDICAID

## 2023-06-29 ENCOUNTER — SPECIALTY PHARMACY (OUTPATIENT)
Dept: PHARMACY | Facility: CLINIC | Age: 43
End: 2023-06-29
Payer: MEDICAID

## 2023-06-29 NOTE — TELEPHONE ENCOUNTER
Specialty Pharmacy - Refill Coordination    Specialty Medication Orders Linked to Encounter      Flowsheet Row Most Recent Value   Medication #1 etanercept (ENBREL SURECLICK) 50 mg/mL (1 mL) (Order#[id number], Rx#[id number])            Refill Questions - Documented Responses      Flowsheet Row Most Recent Value   Patient Availability and HIPAA Verification    Does patient want to proceed with activity? Yes   HIPAA/medical authority confirmed? Yes   Relationship to patient of person spoken to? Self   Refill Screening Questions    Changes to allergies? No   Changes to medications? No   New conditions since last clinic visit? No   Unplanned office visit, urgent care, ED, or hospital admission in the last 4 weeks? No   How does patient/caregiver feel medication is working? Very good   Financial problems or insurance changes? No   How many doses of your specialty medications were missed in the last 4 weeks? 0   Would patient like to speak to a pharmacist? No   When does the patient need to receive the medication? 07/04/23   Refill Delivery Questions    How will the patient receive the medication? MEDRx   When does the patient need to receive the medication? 07/04/23   Shipping Address Home   Address in [facility] confirmed and updated if neccessary? Yes   Expected Copay ($) 0   Is the patient able to afford the medication copay? Yes   Payment Method zero copay   Days supply of Refill 28   Supplies needed? Alcohol Swabs   Refill activity completed? Yes   Refill activity plan Refill scheduled   Shipment/Pickup Date: 06/30/23            Current Outpatient Medications   Medication Sig    benzonatate (TESSALON) 200 MG capsule Take 1 capsule (200 mg total) by mouth 3 (three) times daily as needed for Cough. (Patient not taking: Reported on 4/3/2023)    buPROPion (WELLBUTRIN XL) 300 MG 24 hr tablet Take 300 mg by mouth once daily.     clonazePAM (KLONOPIN) 0.5 MG tablet     dextroamphetamine-amphetamine (ADDERALL XR) 20  MG 24 hr capsule Take 20 mg by mouth every morning.     EPINEPHrine (EPIPEN) 0.3 mg/0.3 mL AtIn Inject 0.6 mLs (0.6 mg total) into the muscle once. for 1 dose    etanercept (ENBREL SURECLICK) 50 mg/mL (1 mL) Inject 1 mL (50 mg total) into the skin once a week.    meloxicam (MOBIC) 15 MG tablet Take 1 tablet (15 mg total) by mouth once daily.    metoprolol succinate (TOPROL-XL) 25 MG 24 hr tablet TAKE ONE TABLET (25 MG TOTAL) BY MOUTH ONCE DAILY    omeprazole (PRILOSEC) 20 MG capsule Take 1 capsule (20 mg total) by mouth once daily.    predniSONE (DELTASONE) 5 MG tablet TAKE ONE TABLET (5MG) BY MOUTH ONCE DAILY    sertraline (ZOLOFT) 100 MG tablet Take 100 mg by mouth once daily.    tiZANidine (ZANAFLEX) 4 MG tablet TAKE ONE TABLET (4MG) BY MOUTH TWO TIMES A DAY AS NEEDED    traZODone (DESYREL) 50 MG tablet Take 50 mg by mouth nightly.   Last reviewed on 4/3/2023 11:30 AM by Glory St DO    Review of patient's allergies indicates:   Allergen Reactions    Gabapentin Other (See Comments)     Increased pain level    Lithium analogues Other (See Comments)     Suicidal thoughts    Nortriptyline Other (See Comments)     Paradoxical effect: sleepless, anxious and increased pain levels    Topamax [topiramate] Other (See Comments)     Lost vision    Pseudoephedrine Anxiety    Sudafed [pseudoephedrine hcl] Anxiety and Palpitations    Last reviewed on  6/1/2023 2:47 PM by Fouzia Sanchez      Tasks added this encounter   No tasks added.   Tasks due within next 3 months   No tasks due.     Glen Jiménez, PharmD  [facility]  [address] [facility] [address]  Phone: [phone]  Fax: [phone]

## 2023-07-31 ENCOUNTER — SPECIALTY PHARMACY (OUTPATIENT)
Dept: PHARMACY | Facility: CLINIC | Age: 43
End: 2023-07-31
Payer: MEDICAID

## 2023-07-31 NOTE — TELEPHONE ENCOUNTER
Specialty Pharmacy - Refill Coordination    Specialty Medication Orders Linked to Encounter      Flowsheet Row Most Recent Value   Medication #1 etanercept (ENBREL SURECLICK) 50 mg/mL (1 mL) (Order#[id number], Rx#[id number])            Refill Questions - Documented Responses      Flowsheet Row Most Recent Value   Refill Screening Questions    Changes to allergies? No   Changes to medications? No   New conditions since last clinic visit? No   Unplanned office visit, urgent care, ED, or hospital admission in the last 4 weeks? No   How does patient/caregiver feel medication is working? Good   Financial problems or insurance changes? No   How many doses of your specialty medications were missed in the last 4 weeks? 0   Would patient like to speak to a pharmacist? No   When does the patient need to receive the medication? 08/02/23   Refill Delivery Questions    How will the patient receive the medication? MEDRx   When does the patient need to receive the medication? 08/02/23   Shipping Address Home   Address in [facility] confirmed and updated if neccessary? Yes   Expected Copay ($) 0   Is the patient able to afford the medication copay? Yes   Payment Method zero copay   Days supply of Refill 28   Supplies needed? No supplies needed   Refill activity completed? Yes   Refill activity plan Refill scheduled   Shipment/Pickup Date: 08/01/23            Current Outpatient Medications   Medication Sig    benzonatate (TESSALON) 200 MG capsule Take 1 capsule (200 mg total) by mouth 3 (three) times daily as needed for Cough. (Patient not taking: Reported on 4/3/2023)    buPROPion (WELLBUTRIN XL) 300 MG 24 hr tablet Take 300 mg by mouth once daily.     clonazePAM (KLONOPIN) 0.5 MG tablet     dextroamphetamine-amphetamine (ADDERALL XR) 20 MG 24 hr capsule Take 20 mg by mouth every morning.     EPINEPHrine (EPIPEN) 0.3 mg/0.3 mL AtIn Inject 0.6 mLs (0.6 mg total) into the muscle once. for 1 dose    etanercept (ENBREL  SURECLICK) 50 mg/mL (1 mL) Inject 1 mL (50 mg total) into the skin once a week.    meloxicam (MOBIC) 15 MG tablet Take 1 tablet (15 mg total) by mouth once daily.    metoprolol succinate (TOPROL-XL) 25 MG 24 hr tablet TAKE ONE TABLET (25 MG TOTAL) BY MOUTH ONCE DAILY    omeprazole (PRILOSEC) 20 MG capsule Take 1 capsule (20 mg total) by mouth once daily.    predniSONE (DELTASONE) 5 MG tablet TAKE ONE TABLET (5MG) BY MOUTH ONCE DAILY    sertraline (ZOLOFT) 100 MG tablet Take 100 mg by mouth once daily.    tiZANidine (ZANAFLEX) 4 MG tablet TAKE ONE TABLET (4MG) BY MOUTH TWO TIMES A DAY AS NEEDED    traZODone (DESYREL) 50 MG tablet Take 50 mg by mouth nightly.   Last reviewed on 4/3/2023 11:30 AM by Glory St DO    Review of patient's allergies indicates:   Allergen Reactions    Gabapentin Other (See Comments)     Increased pain level    Lithium analogues Other (See Comments)     Suicidal thoughts    Nortriptyline Other (See Comments)     Paradoxical effect: sleepless, anxious and increased pain levels    Topamax [topiramate] Other (See Comments)     Lost vision    Pseudoephedrine Anxiety    Sudafed [pseudoephedrine hcl] Anxiety and Palpitations    Last reviewed on  6/1/2023 2:47 PM by Fouzia Sanchez      Tasks added this encounter   No tasks added.   Tasks due within next 3 months   8/3/2023 - Refill Coordination Outreach (1 time occurrence)     JAJA PACKER, PharmD  [facility]  [address] [place] [address]  Phone: [phone]  Fax: [phone]

## 2023-08-08 ENCOUNTER — LAB VISIT (OUTPATIENT)
Dept: LAB | Facility: HOSPITAL | Age: 43
End: 2023-08-08
Attending: INTERNAL MEDICINE
Payer: MEDICAID

## 2023-08-08 DIAGNOSIS — Z79.899 HIGH RISK MEDICATIONS (NOT ANTICOAGULANTS) LONG-TERM USE: ICD-10-CM

## 2023-08-08 DIAGNOSIS — D84.9 IMMUNOSUPPRESSION: ICD-10-CM

## 2023-08-08 DIAGNOSIS — M06.00 SERONEGATIVE RHEUMATOID ARTHRITIS: ICD-10-CM

## 2023-08-08 PROCEDURE — 86140 C-REACTIVE PROTEIN: CPT | Performed by: INTERNAL MEDICINE

## 2023-08-08 PROCEDURE — 85025 COMPLETE CBC W/AUTO DIFF WBC: CPT | Performed by: INTERNAL MEDICINE

## 2023-08-08 PROCEDURE — 80053 COMPREHEN METABOLIC PANEL: CPT | Performed by: INTERNAL MEDICINE

## 2023-08-08 PROCEDURE — 36415 COLL VENOUS BLD VENIPUNCTURE: CPT | Mod: PN | Performed by: INTERNAL MEDICINE

## 2023-08-08 PROCEDURE — 85652 RBC SED RATE AUTOMATED: CPT | Performed by: INTERNAL MEDICINE

## 2023-08-09 LAB
ALBUMIN SERPL BCP-MCNC: 3.8 G/DL (ref 3.5–5.2)
ALP SERPL-CCNC: 81 U/L (ref 55–135)
ALT SERPL W/O P-5'-P-CCNC: 15 U/L (ref 10–44)
ANION GAP SERPL CALC-SCNC: 12 MMOL/L (ref 8–16)
AST SERPL-CCNC: 18 U/L (ref 10–40)
BASOPHILS # BLD AUTO: 0.08 K/UL (ref 0–0.2)
BASOPHILS NFR BLD: 0.7 % (ref 0–1.9)
BILIRUB SERPL-MCNC: 0.3 MG/DL (ref 0.1–1)
BUN SERPL-MCNC: 14 MG/DL (ref 6–20)
CALCIUM SERPL-MCNC: 9.2 MG/DL (ref 8.7–10.5)
CHLORIDE SERPL-SCNC: 102 MMOL/L (ref 95–110)
CO2 SERPL-SCNC: 23 MMOL/L (ref 23–29)
CREAT SERPL-MCNC: 1.1 MG/DL (ref 0.5–1.4)
CRP SERPL-MCNC: 4.2 MG/L (ref 0–8.2)
DIFFERENTIAL METHOD: NORMAL
EOSINOPHIL # BLD AUTO: 0.2 K/UL (ref 0–0.5)
EOSINOPHIL NFR BLD: 1.5 % (ref 0–8)
ERYTHROCYTE [DISTWIDTH] IN BLOOD BY AUTOMATED COUNT: 12.2 % (ref 11.5–14.5)
ERYTHROCYTE [SEDIMENTATION RATE] IN BLOOD BY PHOTOMETRIC METHOD: 10 MM/HR (ref 0–36)
EST. GFR  (NO RACE VARIABLE): >60 ML/MIN/1.73 M^2
GLUCOSE SERPL-MCNC: 91 MG/DL (ref 70–110)
HCT VFR BLD AUTO: 40.2 % (ref 37–48.5)
HGB BLD-MCNC: 13.6 G/DL (ref 12–16)
IMM GRANULOCYTES # BLD AUTO: 0.04 K/UL (ref 0–0.04)
IMM GRANULOCYTES NFR BLD AUTO: 0.4 % (ref 0–0.5)
LYMPHOCYTES # BLD AUTO: 3.7 K/UL (ref 1–4.8)
LYMPHOCYTES NFR BLD: 33.9 % (ref 18–48)
MCH RBC QN AUTO: 30.2 PG (ref 27–31)
MCHC RBC AUTO-ENTMCNC: 33.8 G/DL (ref 32–36)
MCV RBC AUTO: 89 FL (ref 82–98)
MONOCYTES # BLD AUTO: 0.7 K/UL (ref 0.3–1)
MONOCYTES NFR BLD: 6.6 % (ref 4–15)
NEUTROPHILS # BLD AUTO: 6.3 K/UL (ref 1.8–7.7)
NEUTROPHILS NFR BLD: 56.9 % (ref 38–73)
NRBC BLD-RTO: 0 /100 WBC
PLATELET # BLD AUTO: 254 K/UL (ref 150–450)
PMV BLD AUTO: 12.5 FL (ref 9.2–12.9)
POTASSIUM SERPL-SCNC: 3.3 MMOL/L (ref 3.5–5.1)
PROT SERPL-MCNC: 7.1 G/DL (ref 6–8.4)
RBC # BLD AUTO: 4.51 M/UL (ref 4–5.4)
SODIUM SERPL-SCNC: 137 MMOL/L (ref 136–145)
WBC # BLD AUTO: 10.98 K/UL (ref 3.9–12.7)

## 2023-09-06 ENCOUNTER — OFFICE VISIT (OUTPATIENT)
Dept: RHEUMATOLOGY | Facility: CLINIC | Age: 43
End: 2023-09-06
Payer: MEDICAID

## 2023-09-06 VITALS
DIASTOLIC BLOOD PRESSURE: 75 MMHG | WEIGHT: 159.81 LBS | BODY MASS INDEX: 29.41 KG/M2 | HEIGHT: 62 IN | SYSTOLIC BLOOD PRESSURE: 119 MMHG | HEART RATE: 89 BPM

## 2023-09-06 DIAGNOSIS — Z79.899 IMMUNOSUPPRESSION DUE TO DRUG THERAPY: ICD-10-CM

## 2023-09-06 DIAGNOSIS — M79.7 FIBROMYALGIA: ICD-10-CM

## 2023-09-06 DIAGNOSIS — Z79.899 HIGH RISK MEDICATIONS (NOT ANTICOAGULANTS) LONG-TERM USE: ICD-10-CM

## 2023-09-06 DIAGNOSIS — M06.00 SERONEGATIVE RHEUMATOID ARTHRITIS: Primary | ICD-10-CM

## 2023-09-06 DIAGNOSIS — D84.821 IMMUNOSUPPRESSION DUE TO DRUG THERAPY: ICD-10-CM

## 2023-09-06 DIAGNOSIS — G93.32 MYALGIC ENCEPHALOMYELITIS/CHRONIC FATIGUE SYNDROME (ME/CFS): ICD-10-CM

## 2023-09-06 PROCEDURE — 3074F SYST BP LT 130 MM HG: CPT | Mod: CPTII,,, | Performed by: INTERNAL MEDICINE

## 2023-09-06 PROCEDURE — 1159F MED LIST DOCD IN RCRD: CPT | Mod: CPTII,,, | Performed by: INTERNAL MEDICINE

## 2023-09-06 PROCEDURE — 99999 PR PBB SHADOW E&M-EST. PATIENT-LVL III: ICD-10-PCS | Mod: PBBFAC,,, | Performed by: INTERNAL MEDICINE

## 2023-09-06 PROCEDURE — 99215 PR OFFICE/OUTPT VISIT, EST, LEVL V, 40-54 MIN: ICD-10-PCS | Mod: S$PBB,,, | Performed by: INTERNAL MEDICINE

## 2023-09-06 PROCEDURE — 99999 PR PBB SHADOW E&M-EST. PATIENT-LVL III: CPT | Mod: PBBFAC,,, | Performed by: INTERNAL MEDICINE

## 2023-09-06 PROCEDURE — 3008F PR BODY MASS INDEX (BMI) DOCUMENTED: ICD-10-PCS | Mod: CPTII,,, | Performed by: INTERNAL MEDICINE

## 2023-09-06 PROCEDURE — 3074F PR MOST RECENT SYSTOLIC BLOOD PRESSURE < 130 MM HG: ICD-10-PCS | Mod: CPTII,,, | Performed by: INTERNAL MEDICINE

## 2023-09-06 PROCEDURE — 99213 OFFICE O/P EST LOW 20 MIN: CPT | Mod: PBBFAC,PN | Performed by: INTERNAL MEDICINE

## 2023-09-06 PROCEDURE — 1159F PR MEDICATION LIST DOCUMENTED IN MEDICAL RECORD: ICD-10-PCS | Mod: CPTII,,, | Performed by: INTERNAL MEDICINE

## 2023-09-06 PROCEDURE — 99215 OFFICE O/P EST HI 40 MIN: CPT | Mod: S$PBB,,, | Performed by: INTERNAL MEDICINE

## 2023-09-06 PROCEDURE — 3078F PR MOST RECENT DIASTOLIC BLOOD PRESSURE < 80 MM HG: ICD-10-PCS | Mod: CPTII,,, | Performed by: INTERNAL MEDICINE

## 2023-09-06 PROCEDURE — 3008F BODY MASS INDEX DOCD: CPT | Mod: CPTII,,, | Performed by: INTERNAL MEDICINE

## 2023-09-06 PROCEDURE — 3078F DIAST BP <80 MM HG: CPT | Mod: CPTII,,, | Performed by: INTERNAL MEDICINE

## 2023-09-06 RX ORDER — TIZANIDINE 4 MG/1
TABLET ORAL
Qty: 60 TABLET | Refills: 6 | Status: SHIPPED | OUTPATIENT
Start: 2023-09-06 | End: 2024-01-17 | Stop reason: SDUPTHER

## 2023-09-06 RX ORDER — MELOXICAM 15 MG/1
15 TABLET ORAL DAILY
Qty: 30 TABLET | Refills: 6 | Status: SHIPPED | OUTPATIENT
Start: 2023-09-06 | End: 2024-01-17 | Stop reason: SDUPTHER

## 2023-09-06 RX ORDER — PREDNISONE 5 MG/1
TABLET ORAL
Qty: 90 TABLET | Refills: 3 | Status: SHIPPED | OUTPATIENT
Start: 2023-09-06 | End: 2024-01-17 | Stop reason: SDUPTHER

## 2023-09-06 ASSESSMENT — ROUTINE ASSESSMENT OF PATIENT INDEX DATA (RAPID3)
FATIGUE SCORE: 1.1
PATIENT GLOBAL ASSESSMENT SCORE: 5
PAIN SCORE: 3
MDHAQ FUNCTION SCORE: 0.8
TOTAL RAPID3 SCORE: 3.55
PSYCHOLOGICAL DISTRESS SCORE: 1.1

## 2023-09-06 NOTE — PROGRESS NOTES
Subjective:          Chief Complaint: Dorothy Jesus is a 43 y.o. female who had concerns including Disease Management.    HPI:    Seronegative Rheumatoid/ Undifferentiated connective tissue disease   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 No cardiac manifestations.     Positive LENA , elevation in her CRP normal sedimentation rate but did have benefit on prednisone.    trial of prednisone did show significant improvement in her overall symptomst   When she comes off prednisone everything seems to regress at her baseline. No rashes, some pain with respirations, no fevers but subjectively, fatigue in sun no rash.     Patient with hand and ankle pain as of last few weeks but also more widespread MSK complaints and fatigue as well. Using Prednisone with modest improvement.   Rapid  3   5/2022: 5.44  11/1/22: 3.89  3/2023: 5.44  9/2023: 3.55    Restarted Humira 6/2020-3/2021 stopped.   TB/Hepatitis updated 2/2021 negative.     Enbrel (4/2021)  Prednisone still 5mg daily only about 2 times weekly which is markedly improved.     Tizanidine helps with back and upper trapezius.   Affected joints: feet, knees and hands.       She failed HCQ with no response  MTX made her ill/nausea.  SSZ 2gm no benefit.   Tylenol as not helpful  Ibuprofen PRN more for menstrual cramps never found helpful for joints.       FMS:     She has increased her activity level trying to eat healthier.  In the past she states she was getting some relief of myalgias and arthralgias on ketoprofen.  He completed the vitamin D 12 weeks her psychiatrist has recently adjusted her medications slightly and this seems to be helping a great deal.  Interestingly she noted with the adjustment in her ADHD meds her myalgias have improved.Patient is a 36 y/o female referred for +LEAN  1:80 homogeneous with repeat serologies + 1:320 negative LENA profile.  with myalgias, brain fog. Present for few years worse in past 2 years, affecting daily  "living/function. Patient did suffer from worsening of her symptoms after trying gabapentin, she did increase dose as we discussed but this only exacerbated her complaints. She did have worseing depression through the winter. Recent job did not work out for her, which was frustrating.  did recently get full time job.    Exacerbates with menstrual cycles. Patient has a non-restorative sleep pattern, no snoring. She has had sleep study-[place] negative for narcoplepsy. Patient was given Provigil exacerbated anxiety.    Recently with cognitive impairment, ++migraines (unable to see Dr. Yao at this time, cannot drive to [facility]), extreme fatigue, dizzyness. She is more forgetful that typical.   Trying cataflam PRN migraine.      Patient was on lamictal for nearly 8 years d/c'd for suspected myalgias. Recently (few weeks ago) trialed lithium did not tolerate. Zyprexa, Geodon, topamax and risperdol. Wellbutrin has been the best medication for her as she continues to feel "human". She is having more myalgias, and some arthralgias some good days some bad days. She cannot see a pattern of morning stiffness.     Sister with UC  Mom recent dx RA.   Component      Latest Ref Rng & Units 4/13/2017 12/10/2015   Anti Sm Antibody      0.00 - 19.99 EU 0.35 0.43   Anti-Sm Interpretation      Negative Negative Negative   Anti-SSA Antibody      0.00 - 19.99 EU 0.97 1.62   Anti-SSA Interpretation      Negative Negative Negative   Anti-SSB Antibody      0.00 - 19.99 EU 0.35 0.00   Anti-SSB Interpretation      Negative Negative Negative   ds DNA Ab      Negative 1:10 Negative 1:10 Negative 1:10   Anti Sm/RNP Antibody      0.00 - 19.99 EU 0.79 0.86   Anti-Sm/RNP Interpretation      Negative Negative Negative   Complement (C-3)      50 - 180 mg/dL  108   Complement (C-4)      11 - 44 mg/dL  27   CPK      20 - 180 U/L  50   LENA Screen      Negative <1:160 Positive (A)    LENA HEP-2 Titer       Positive 1:320 Homogeneous        REVIEW OF " SYSTEMS:    Review of Systems   Constitutional:  Positive for malaise/fatigue. Negative for fever and weight loss.   HENT:  Negative for sore throat.    Eyes:  Negative for double vision, photophobia and redness.   Respiratory:  Negative for cough, shortness of breath and wheezing.    Cardiovascular:  Negative for chest pain, palpitations and orthopnea.   Gastrointestinal:  Negative for abdominal pain, constipation and diarrhea.   Genitourinary:  Negative for dysuria, hematuria and urgency.   Musculoskeletal:  Positive for joint pain and neck pain. Negative for back pain and myalgias.   Skin:  Negative for rash.   Neurological:  Negative for dizziness, tingling, focal weakness and headaches.   Endo/Heme/Allergies:  Does not bruise/bleed easily.   Psychiatric/Behavioral:  Negative for depression, hallucinations and suicidal ideas.                Objective:            Past Medical History:   Diagnosis Date    Anxiety     Bartholin's cyst     Bipolar 2 disorder     Chronic fatigue     Osteopenia      Family History   Problem Relation Age of Onset    Breast cancer Maternal Grandmother      Social History     Tobacco Use    Smoking status: Never    Smokeless tobacco: Never   Substance Use Topics    Alcohol use: No     Comment: rare    Drug use: No         Current Outpatient Medications on File Prior to Visit   Medication Sig Dispense Refill    buPROPion (WELLBUTRIN XL) 300 MG 24 hr tablet Take 300 mg by mouth once daily.       clonazePAM (KLONOPIN) 0.5 MG tablet       dextroamphetamine-amphetamine (ADDERALL XR) 20 MG 24 hr capsule Take 20 mg by mouth every morning.       etanercept (ENBREL SURECLICK) 50 mg/mL (1 mL) Inject 1 mL (50 mg total) into the skin once a week. 4 mL 11    meloxicam (MOBIC) 15 MG tablet Take 1 tablet (15 mg total) by mouth once daily. 30 tablet 6    metoprolol succinate (TOPROL-XL) 25 MG 24 hr tablet TAKE ONE TABLET (25 MG TOTAL) BY MOUTH ONCE DAILY 90 tablet 3    omeprazole (PRILOSEC) 20 MG  capsule Take 1 capsule (20 mg total) by mouth once daily. 30 capsule 11    predniSONE (DELTASONE) 5 MG tablet TAKE ONE TABLET (5MG) BY MOUTH ONCE DAILY 90 tablet 3    sertraline (ZOLOFT) 100 MG tablet Take 100 mg by mouth once daily.      tiZANidine (ZANAFLEX) 4 MG tablet TAKE ONE TABLET (4MG) BY MOUTH TWO TIMES A DAY AS NEEDED 60 tablet 6    benzonatate (TESSALON) 200 MG capsule Take 1 capsule (200 mg total) by mouth 3 (three) times daily as needed for Cough. (Patient not taking: Reported on 4/3/2023) 30 capsule 0    EPINEPHrine (EPIPEN) 0.3 mg/0.3 mL AtIn Inject 0.6 mLs (0.6 mg total) into the muscle once. for 1 dose 2 each 1    traZODone (DESYREL) 50 MG tablet Take 50 mg by mouth nightly.       No current facility-administered medications on file prior to visit.       Vitals:    09/06/23 1506   BP: 119/75   Pulse: 89       Physical Exam:    Physical Exam  Constitutional:       Appearance: She is well-developed.   HENT:      Head: Normocephalic and atraumatic.   Eyes:      General: Lids are normal.      Pupils: Pupils are equal, round, and reactive to light.   Cardiovascular:      Rate and Rhythm: Normal rate and regular rhythm.      Heart sounds: Normal heart sounds.   Pulmonary:      Effort: Pulmonary effort is normal.      Breath sounds: Normal breath sounds.   Musculoskeletal:      Right shoulder: Tenderness present. No swelling. Normal range of motion.      Left shoulder: Tenderness present. No swelling. Normal range of motion.      Right elbow: No swelling. Normal range of motion. No tenderness.      Left elbow: No swelling. Normal range of motion. No tenderness.      Right wrist: Tenderness present. No swelling. Normal range of motion.      Left wrist: Tenderness present. No swelling. Normal range of motion.      Right hand: Tenderness present. No swelling. Normal range of motion.      Left hand: Tenderness present. No swelling. Normal range of motion.      Cervical back: Normal range of motion.      Right  knee: No swelling. Normal range of motion. No tenderness.      Left knee: No swelling. Normal range of motion. No tenderness.      Right foot: Normal range of motion. No swelling or tenderness.      Left foot: Normal range of motion. No swelling or tenderness.      Comments: 18 tender points examined in nine pairs: Posterior occiput, bilateral trapezius, bilateral supraspinatus, bilateral gluteal muscles, bilateral low cervical neck, bilateral second rib, bilateral lateral epicondyles, bilateral greater trochanters, bilateral medial knees. 16 Of 18 points examined were positive for tenderness.      MCP and PIP tenderness with full but painful finger curl.      Skin:     General: Skin is warm and dry.   Neurological:      Mental Status: She is alert and oriented to person, place, and time.   Psychiatric:         Behavior: Behavior normal.         Thought Content: Thought content normal.               Assessment:       Encounter Diagnoses   Name Primary?    Seronegative rheumatoid arthritis Yes    Fibromyalgia     High risk medications (not anticoagulants) long-term use     Immunosuppression due to drug therapy     Myalgic encephalomyelitis/chronic fatigue syndrome (ME/CFS)               Plan:        Seronegative rheumatoid arthritis  -     meloxicam (MOBIC) 15 MG tablet; Take 1 tablet (15 mg total) by mouth once daily.  Dispense: 30 tablet; Refill: 6  -     tiZANidine (ZANAFLEX) 4 MG tablet; TAKE ONE TABLET (4MG) BY MOUTH TWO TIMES A DAY AS NEEDED  Dispense: 60 tablet; Refill: 6  -     predniSONE (DELTASONE) 5 MG tablet; TAKE ONE TABLET (5MG) BY MOUTH ONCE DAILY  Dispense: 90 tablet; Refill: 3  -     CBC Auto Differential; Standing; Expected date: 09/06/2023  -     Comprehensive Metabolic Panel; Standing; Expected date: 09/06/2023  -     Sedimentation rate; Standing; Expected date: 09/06/2023    Fibromyalgia  -     tiZANidine (ZANAFLEX) 4 MG tablet; TAKE ONE TABLET (4MG) BY MOUTH TWO TIMES A DAY AS NEEDED  Dispense:  "60 tablet; Refill: 6    High risk medications (not anticoagulants) long-term use  -     CBC Auto Differential; Standing; Expected date: 09/06/2023  -     Comprehensive Metabolic Panel; Standing; Expected date: 09/06/2023  -     Sedimentation rate; Standing; Expected date: 09/06/2023    Immunosuppression due to drug therapy  -     CBC Auto Differential; Standing; Expected date: 09/06/2023  -     Comprehensive Metabolic Panel; Standing; Expected date: 09/06/2023  -     Sedimentation rate; Standing; Expected date: 09/06/2023    Myalgic encephalomyelitis/chronic fatigue syndrome (ME/CFS)    Continue Prednisone 5mg now using PRN approx 5/7 days out of the week  Continue w/  Enbrel 50mg sureclick. (start 4/2021)  Continue with Prednisone 5mg PRN  Continue with Meloxicam- only using "sometimes"   Rapid 3 is back up with admitted flare per patient- trial with Depomedrol 80mg IM as labs look good this could be multifactorial.   Adding pe  No response with HCQ>   Intolerant MTX  SSZ tolerating but no significant difference when we held it for few weeks.  - off.     Continue Tizanidine    Follow up in about 4 months (around 1/6/2024).       40min consultation with greater than 50% of that time included Preparing to see the patient (review records, tests), Obtaining and/or reviewing separately obtained historical data, Performing a medically appropriate examination and/or evaluation , Ordering medications, tests, and/or procedures, Referring and communicating with other healthcare professionals , Documenting clinical information in the electronic or other health record and Independently interpreting results  (as warranted) & communicating results to the patient/family/caregiver. All questions answered.                        "

## 2023-09-09 DIAGNOSIS — R00.0 TACHYCARDIA: ICD-10-CM

## 2023-09-09 NOTE — TELEPHONE ENCOUNTER
No care due was identified.  [facility] Embedded Care Due Messages. Reference number: [id number].   9/09/2023 2:11:09 AM CDT

## 2023-09-10 RX ORDER — METOPROLOL SUCCINATE 25 MG/1
TABLET, EXTENDED RELEASE ORAL
Qty: 90 TABLET | Refills: 0 | Status: SHIPPED | OUTPATIENT
Start: 2023-09-10 | End: 2023-12-27

## 2023-09-10 NOTE — TELEPHONE ENCOUNTER
Refill Decision Note   Dorothy Jesus  is requesting a refill authorization.  Brief Assessment and Rationale for Refill:  Approve     Medication Therapy Plan:         Comments:     Note composed:2:23 AM 09/10/2023

## 2023-10-12 ENCOUNTER — PATIENT OUTREACH (OUTPATIENT)
Dept: ADMINISTRATIVE | Facility: HOSPITAL | Age: 43
End: 2023-10-12
Payer: MEDICAID

## 2023-10-12 DIAGNOSIS — Z12.31 OTHER SCREENING MAMMOGRAM: Primary | ICD-10-CM

## 2023-10-25 ENCOUNTER — PATIENT MESSAGE (OUTPATIENT)
Dept: ADMINISTRATIVE | Facility: OTHER | Age: 43
End: 2023-10-25
Payer: MEDICAID

## 2023-10-26 ENCOUNTER — PATIENT MESSAGE (OUTPATIENT)
Dept: RHEUMATOLOGY | Facility: CLINIC | Age: 43
End: 2023-10-26
Payer: MEDICAID

## 2023-10-30 ENCOUNTER — OFFICE VISIT (OUTPATIENT)
Dept: URGENT CARE | Facility: CLINIC | Age: 43
End: 2023-10-30
Payer: MEDICAID

## 2023-10-30 VITALS
DIASTOLIC BLOOD PRESSURE: 74 MMHG | HEART RATE: 82 BPM | HEIGHT: 62 IN | BODY MASS INDEX: 29.81 KG/M2 | SYSTOLIC BLOOD PRESSURE: 136 MMHG | RESPIRATION RATE: 16 BRPM | OXYGEN SATURATION: 98 % | TEMPERATURE: 98 F | WEIGHT: 162 LBS

## 2023-10-30 DIAGNOSIS — U07.1 COVID-19: Primary | ICD-10-CM

## 2023-10-30 DIAGNOSIS — R05.9 COUGH, UNSPECIFIED TYPE: ICD-10-CM

## 2023-10-30 LAB
CTP QC/QA: YES
SARS-COV-2 AG RESP QL IA.RAPID: POSITIVE

## 2023-10-30 PROCEDURE — 87811 SARS-COV-2 COVID19 W/OPTIC: CPT | Mod: QW,S$GLB,,

## 2023-10-30 PROCEDURE — 99214 PR OFFICE/OUTPT VISIT, EST, LEVL IV, 30-39 MIN: ICD-10-PCS | Mod: S$GLB,,,

## 2023-10-30 PROCEDURE — 99214 OFFICE O/P EST MOD 30 MIN: CPT | Mod: S$GLB,,,

## 2023-10-30 PROCEDURE — 87811 SARS CORONAVIRUS 2 ANTIGEN POCT, MANUAL READ: ICD-10-PCS | Mod: QW,S$GLB,,

## 2023-10-30 RX ORDER — BENZONATATE 100 MG/1
100 CAPSULE ORAL 3 TIMES DAILY PRN
Qty: 30 CAPSULE | Refills: 0 | Status: SHIPPED | OUTPATIENT
Start: 2023-10-30 | End: 2023-11-09

## 2023-10-30 RX ORDER — CETIRIZINE HYDROCHLORIDE 10 MG/1
10 TABLET ORAL DAILY
Qty: 30 TABLET | Refills: 0 | Status: SHIPPED | OUTPATIENT
Start: 2023-10-30 | End: 2023-11-29

## 2023-10-30 RX ORDER — ALBUTEROL SULFATE 90 UG/1
2 AEROSOL, METERED RESPIRATORY (INHALATION) EVERY 6 HOURS PRN
Qty: 6.7 G | Refills: 0 | Status: SHIPPED | OUTPATIENT
Start: 2023-10-30 | End: 2023-11-29

## 2023-10-30 RX ORDER — PROMETHAZINE HYDROCHLORIDE AND DEXTROMETHORPHAN HYDROBROMIDE 6.25; 15 MG/5ML; MG/5ML
5 SYRUP ORAL NIGHTLY PRN
Qty: 50 ML | Refills: 0 | Status: SHIPPED | OUTPATIENT
Start: 2023-10-30 | End: 2023-11-09

## 2023-10-30 NOTE — PATIENT INSTRUCTIONS
[facility]NE OR EMERGEN-C  OR YOU MAY TAKE  ZINC 50 MG A DAY.  MAGNESIUM 300-400MG A DAY.  VITAMIN D3 3,000 UNITS A DAY.  VITAMIN C 1,000MG THREE TIMES A DAY.   ECHINACEA AS DIRECTED       Symptomatic treatment to include:    Rest, increase fluid intake to include 50 % water, 50% electrolyte replacement  Ibuprofen/Tylenol as directed for fever, sore throat, headache, body aches.  Tylenol helps with fever but ibuprofen or aleve helps best for other symptoms.   Always take ibuprofen and or Aleve with food as repeated use can cause stomach irritation.  It is also advised to start taking Pepcid 20 mg over-the-counter twice a day for 7-10 days whenever taking NSAIDs for extended times for stomach protection  Zrytec 10 mg daily for 3-4 weeks  flonase 2 sprays each nostril daily until bottle is empty.   Astelin 1 spray each nostril twice a day as needed for nasal congestion  Phenergan cough syrup at night for cough.  Will cause drowsiness  Tessalon perles cough pills as needed day or night.  Can be taken together with cough syrup if desired.  Helps best for dry, throat irritation cough.  Mucinex D over the counter as directed for sinus congestion.  Coricidin HBP if you have high blood pressure.  Warm, salt water gargles, over the counter throat lozenges or sprays as desires.   Liquid benadryl and maalox 1 to 1 concentration, gargle and spit for temporary relief for sore throat.  Imodium over the counter as directed for diarrhea if desired.  ER for difficulty breathing not relieved by rest, excessive lethargy and/or change in mental status  Albuterol inhaler every 4-6 hours while awake until symptoms are improving then just as needed for persistent cough, chest heaviness, chest tightness, wheezing, shortness of breath    Follow CDC isolation guidelines as provided     Patient Instructions   POSITIVE COVID TEST      You have tested positive for COVID-19 today.  Please note that patients who test positive for COVID-19 are  required by the CDC to undergo isolation for 5 days, then wearing a mask around others for an additional 5 days, after their symptoms first began following the new updated guidelines of 12/27/2021. This isolation starts from the day you first developed symptoms, not the day of your positive test. For example, if your symptoms began on a Monday but tested positive on the following Wednesday, your 5-day isolation begins from that Monday, not the Wednesday you tested positive.  However, if you are asymptomatic (a person who does not have any symptoms) and COVID-19 positive, your 5-day isolation begins on the day you tested positive, regardless of exposure date.  Also, per the CDC guidelines, once your 5 days have passed, symptoms have resolved or are improving, and you have not had fever greater than 100.4F in the last 24 hours without taking any fever reducers such as Tylenol (Acetaminophen) or Motrin (Ibuprofen), you may return to your normal activities including social distancing, wearing masks, and frequent handwashing - YOU DO NOT NEED ANOTHER TEST IN ORDER TO END YOUR QUARANTINE.

## 2023-10-30 NOTE — PROGRESS NOTES
"Subjective:      Patient ID: Dorothy Jesus is a 43 y.o. female.    Vitals:  height is 5' 2" (1.575 m) and weight is 73.5 kg (162 lb). Her temperature is 98.3 °F (36.8 °C). Her blood pressure is 136/74 and her pulse is 82. Her respiration is 16 and oxygen saturation is 98%.     Chief Complaint: Cough    Ms. Jesus, past medical history immunosuppression, fibromyalgia, bipolar, presents to clinic with a chief complaint of fever(102.3 T-max) fatigue, cough, congestion, body ache, nausea, and sore throat since Friday.  She is alternate between Tylenol and Motrin for fevers and body aches.  She has also used Benadryl.  Spouse at home recently COVID positive.  Patient is COVID-19 vaccinated      Cough  This is a new problem. The current episode started in the past 7 days (x4 days). The problem has been gradually improving. The cough is Non-productive. Associated symptoms include chills, a fever, headaches, myalgias, nasal congestion, postnasal drip and a sore throat. Pertinent negatives include no rash. Nothing aggravates the symptoms. Her past medical history is significant for environmental allergies.       Constitution: Positive for chills, fever and generalized weakness.   HENT:  Positive for congestion, postnasal drip and sore throat.    Neck: Negative for painful lymph nodes.   Cardiovascular:  Negative for palpitations.   Eyes:  Negative for blurred vision.   Respiratory:  Positive for cough. Negative for sputum production.    Gastrointestinal:  Positive for nausea. Negative for vomiting and diarrhea.   Endocrine: cold intolerance and heat intolerance.   Genitourinary:  Negative for dysuria, frequency and urgency.   Musculoskeletal:  Positive for muscle ache.   Skin:  Negative for rash.   Allergic/Immunologic: Positive for environmental allergies, seasonal allergies and immunizations up-to-date.   Neurological:  Positive for headaches and history of migraines. Negative for altered mental status. "   Hematologic/Lymphatic: Negative for swollen lymph nodes.   Psychiatric/Behavioral:  Negative for altered mental status.       Objective:     Physical Exam   Constitutional: She is oriented to person, place, and time. She appears well-developed. She is cooperative.  Non-toxic appearance. She does not appear ill. No distress. obesity  HENT:   Head: Normocephalic and atraumatic.   Ears:   Right Ear: Hearing, tympanic membrane, external ear and ear canal normal.   Left Ear: Hearing, tympanic membrane, external ear and ear canal normal.   Nose: Congestion present. No mucosal edema or nasal deformity. No epistaxis. Right sinus exhibits no maxillary sinus tenderness and no frontal sinus tenderness. Left sinus exhibits no maxillary sinus tenderness and no frontal sinus tenderness.   Mouth/Throat: Uvula is midline and mucous membranes are normal. Mucous membranes are moist. No trismus in the jaw. Normal dentition. No uvula swelling. Posterior oropharyngeal erythema present. Oropharynx is clear.   Eyes: Conjunctivae and lids are normal. Pupils are equal, round, and reactive to light. Extraocular movement intact   Neck: Trachea normal and phonation normal. Neck supple.   Cardiovascular: Normal rate, regular rhythm, normal heart sounds and normal pulses.   Pulmonary/Chest: Effort normal and breath sounds normal.   Abdominal: Normal appearance. Soft. flat abdomen There is no abdominal tenderness.   Musculoskeletal: Normal range of motion.         General: Normal range of motion.   Lymphadenopathy:     She has no cervical adenopathy.   Neurological: no focal deficit. She is alert, oriented to person, place, and time and at baseline. She exhibits normal muscle tone.   Skin: Skin is warm, dry, intact and not diaphoretic. Capillary refill takes 2 to 3 seconds.   Psychiatric: Her speech is normal and behavior is normal. Mood, judgment and thought content normal.   Nursing note and vitals reviewed.      Assessment:     1. COVID-19     2. Cough, unspecified type        Plan:       COVID-19  -     benzonatate (TESSALON) 100 MG capsule; Take 1 capsule (100 mg total) by mouth 3 (three) times daily as needed for Cough.  Dispense: 30 capsule; Refill: 0  -     cetirizine (ZYRTEC) 10 MG tablet; Take 1 tablet (10 mg total) by mouth once daily.  Dispense: 30 tablet; Refill: 0  -     promethazine-dextromethorphan (PROMETHAZINE-DM) 6.25-15 mg/5 mL Syrp; Take 5 mLs by mouth nightly as needed (night time cough).  Dispense: 50 mL; Refill: 0  -     albuterol (PROVENTIL HFA) 90 mcg/actuation inhaler; Inhale 2 puffs into the lungs every 6 (six) hours as needed for Wheezing. Rescue  Dispense: 6.7 g; Refill: 0    Cough, unspecified type  -     SARS Coronavirus 2 Antigen, POCT Manual Read

## 2023-11-03 ENCOUNTER — PATIENT MESSAGE (OUTPATIENT)
Dept: RESEARCH | Facility: HOSPITAL | Age: 43
End: 2023-11-03
Payer: MEDICAID

## 2023-11-13 ENCOUNTER — OFFICE VISIT (OUTPATIENT)
Dept: FAMILY MEDICINE | Facility: CLINIC | Age: 43
End: 2023-11-13
Payer: MEDICAID

## 2023-11-13 ENCOUNTER — PATIENT MESSAGE (OUTPATIENT)
Dept: FAMILY MEDICINE | Facility: CLINIC | Age: 43
End: 2023-11-13
Payer: MEDICAID

## 2023-11-13 ENCOUNTER — LAB VISIT (OUTPATIENT)
Dept: LAB | Facility: HOSPITAL | Age: 43
End: 2023-11-13
Attending: FAMILY MEDICINE
Payer: MEDICAID

## 2023-11-13 VITALS
DIASTOLIC BLOOD PRESSURE: 60 MMHG | SYSTOLIC BLOOD PRESSURE: 122 MMHG | OXYGEN SATURATION: 97 % | HEIGHT: 62 IN | BODY MASS INDEX: 30.1 KG/M2 | HEART RATE: 80 BPM | WEIGHT: 163.56 LBS

## 2023-11-13 DIAGNOSIS — Z00.00 WELLNESS EXAMINATION: ICD-10-CM

## 2023-11-13 DIAGNOSIS — Z23 NEED FOR VACCINATION: ICD-10-CM

## 2023-11-13 DIAGNOSIS — M79.7 FIBROMYALGIA: ICD-10-CM

## 2023-11-13 DIAGNOSIS — Z00.00 WELLNESS EXAMINATION: Primary | ICD-10-CM

## 2023-11-13 LAB
ALBUMIN SERPL BCP-MCNC: 3.9 G/DL (ref 3.5–5.2)
ALP SERPL-CCNC: 83 U/L (ref 55–135)
ALT SERPL W/O P-5'-P-CCNC: 14 U/L (ref 10–44)
ANION GAP SERPL CALC-SCNC: 10 MMOL/L (ref 8–16)
AST SERPL-CCNC: 15 U/L (ref 10–40)
BILIRUB SERPL-MCNC: 0.3 MG/DL (ref 0.1–1)
BUN SERPL-MCNC: 8 MG/DL (ref 6–20)
CALCIUM SERPL-MCNC: 9.3 MG/DL (ref 8.7–10.5)
CHLORIDE SERPL-SCNC: 103 MMOL/L (ref 95–110)
CHOLEST SERPL-MCNC: 231 MG/DL (ref 120–199)
CHOLEST/HDLC SERPL: 4.8 {RATIO} (ref 2–5)
CO2 SERPL-SCNC: 26 MMOL/L (ref 23–29)
CREAT SERPL-MCNC: 0.9 MG/DL (ref 0.5–1.4)
ERYTHROCYTE [DISTWIDTH] IN BLOOD BY AUTOMATED COUNT: 12.7 % (ref 11.5–14.5)
EST. GFR  (NO RACE VARIABLE): >60 ML/MIN/1.73 M^2
ESTIMATED AVG GLUCOSE: 91 MG/DL (ref 68–131)
GLUCOSE SERPL-MCNC: 78 MG/DL (ref 70–110)
HBA1C MFR BLD: 4.8 % (ref 4–5.6)
HCT VFR BLD AUTO: 40.6 % (ref 37–48.5)
HDLC SERPL-MCNC: 48 MG/DL (ref 40–75)
HDLC SERPL: 20.8 % (ref 20–50)
HGB BLD-MCNC: 13.2 G/DL (ref 12–16)
LDLC SERPL CALC-MCNC: 141.2 MG/DL (ref 63–159)
MCH RBC QN AUTO: 29.4 PG (ref 27–31)
MCHC RBC AUTO-ENTMCNC: 32.5 G/DL (ref 32–36)
MCV RBC AUTO: 90 FL (ref 82–98)
NONHDLC SERPL-MCNC: 183 MG/DL
PLATELET # BLD AUTO: 273 K/UL (ref 150–450)
PMV BLD AUTO: 12.2 FL (ref 9.2–12.9)
POTASSIUM SERPL-SCNC: 4 MMOL/L (ref 3.5–5.1)
PROT SERPL-MCNC: 7.2 G/DL (ref 6–8.4)
RBC # BLD AUTO: 4.49 M/UL (ref 4–5.4)
SODIUM SERPL-SCNC: 139 MMOL/L (ref 136–145)
TRIGL SERPL-MCNC: 209 MG/DL (ref 30–150)
TSH SERPL DL<=0.005 MIU/L-ACNC: 1.81 UIU/ML (ref 0.4–4)
WBC # BLD AUTO: 10.65 K/UL (ref 3.9–12.7)

## 2023-11-13 PROCEDURE — 3078F DIAST BP <80 MM HG: CPT | Mod: CPTII,,, | Performed by: FAMILY MEDICINE

## 2023-11-13 PROCEDURE — 3074F PR MOST RECENT SYSTOLIC BLOOD PRESSURE < 130 MM HG: ICD-10-PCS | Mod: CPTII,,, | Performed by: FAMILY MEDICINE

## 2023-11-13 PROCEDURE — 83036 HEMOGLOBIN GLYCOSYLATED A1C: CPT | Performed by: FAMILY MEDICINE

## 2023-11-13 PROCEDURE — 90686 IIV4 VACC NO PRSV 0.5 ML IM: CPT | Mod: PBBFAC,PO

## 2023-11-13 PROCEDURE — 99999PBSHW FLU VACCINE (QUAD) GREATER THAN OR EQUAL TO 3YO PRESERVATIVE FREE IM: Mod: PBBFAC,,,

## 2023-11-13 PROCEDURE — 85027 COMPLETE CBC AUTOMATED: CPT | Performed by: FAMILY MEDICINE

## 2023-11-13 PROCEDURE — 99213 OFFICE O/P EST LOW 20 MIN: CPT | Mod: PBBFAC,PO | Performed by: FAMILY MEDICINE

## 2023-11-13 PROCEDURE — 99999PBSHW FLU VACCINE (QUAD) GREATER THAN OR EQUAL TO 3YO PRESERVATIVE FREE IM: ICD-10-PCS | Mod: PBBFAC,,,

## 2023-11-13 PROCEDURE — 99396 PR PREVENTIVE VISIT,EST,40-64: ICD-10-PCS | Mod: S$PBB,,, | Performed by: FAMILY MEDICINE

## 2023-11-13 PROCEDURE — 3008F PR BODY MASS INDEX (BMI) DOCUMENTED: ICD-10-PCS | Mod: CPTII,,, | Performed by: FAMILY MEDICINE

## 2023-11-13 PROCEDURE — 3008F BODY MASS INDEX DOCD: CPT | Mod: CPTII,,, | Performed by: FAMILY MEDICINE

## 2023-11-13 PROCEDURE — 3078F PR MOST RECENT DIASTOLIC BLOOD PRESSURE < 80 MM HG: ICD-10-PCS | Mod: CPTII,,, | Performed by: FAMILY MEDICINE

## 2023-11-13 PROCEDURE — 99999 PR PBB SHADOW E&M-EST. PATIENT-LVL III: CPT | Mod: PBBFAC,,, | Performed by: FAMILY MEDICINE

## 2023-11-13 PROCEDURE — 1159F MED LIST DOCD IN RCRD: CPT | Mod: CPTII,,, | Performed by: FAMILY MEDICINE

## 2023-11-13 PROCEDURE — 1159F PR MEDICATION LIST DOCUMENTED IN MEDICAL RECORD: ICD-10-PCS | Mod: CPTII,,, | Performed by: FAMILY MEDICINE

## 2023-11-13 PROCEDURE — 36415 COLL VENOUS BLD VENIPUNCTURE: CPT | Mod: PO | Performed by: FAMILY MEDICINE

## 2023-11-13 PROCEDURE — 80061 LIPID PANEL: CPT | Performed by: FAMILY MEDICINE

## 2023-11-13 PROCEDURE — 99396 PREV VISIT EST AGE 40-64: CPT | Mod: S$PBB,,, | Performed by: FAMILY MEDICINE

## 2023-11-13 PROCEDURE — 84443 ASSAY THYROID STIM HORMONE: CPT | Performed by: FAMILY MEDICINE

## 2023-11-13 PROCEDURE — 80053 COMPREHEN METABOLIC PANEL: CPT | Performed by: FAMILY MEDICINE

## 2023-11-13 PROCEDURE — 99999 PR PBB SHADOW E&M-EST. PATIENT-LVL III: ICD-10-PCS | Mod: PBBFAC,,, | Performed by: FAMILY MEDICINE

## 2023-11-13 PROCEDURE — 3074F SYST BP LT 130 MM HG: CPT | Mod: CPTII,,, | Performed by: FAMILY MEDICINE

## 2023-11-13 NOTE — PROGRESS NOTES
Subjective:       Patient ID: Dorothy Jesus is a 43 y.o. female.    Chief Complaint: Annual Exam    Here for wellness and follow up multiple chronic medical issues. Doing well overall and in normal state of health.      Review of Systems   Constitutional:  Negative for chills and fever.   Respiratory:  Negative for cough, chest tightness and shortness of breath.    Cardiovascular:  Negative for chest pain, palpitations and leg swelling.   Endocrine: Negative for cold intolerance and heat intolerance.   Psychiatric/Behavioral:  Negative for decreased concentration. The patient is not nervous/anxious.        Objective:      Physical Exam  Vitals and nursing note reviewed.   Constitutional:       Appearance: She is well-developed.   HENT:      Head: Normocephalic and atraumatic.   Cardiovascular:      Rate and Rhythm: Normal rate and regular rhythm.      Heart sounds: Normal heart sounds.   Pulmonary:      Effort: Pulmonary effort is normal.      Breath sounds: Normal breath sounds.   Psychiatric:         Mood and Affect: Mood normal.         Behavior: Behavior normal.         Assessment:       1. Wellness examination    2. Fibromyalgia        Plan:       Wellness examination  -     Hemoglobin A1C; Future; Expected date: 11/13/2023  -     CBC Without Differential; Future; Expected date: 11/13/2023  -     Comprehensive Metabolic Panel; Future; Expected date: 11/13/2023  -     Lipid Panel; Future; Expected date: 11/13/2023  -     TSH; Future; Expected date: 11/13/2023    Fibromyalgia      Update labs and health maintenance  Will monitor chronic medical issues and continue current plan of care.  Flu shot today    Follow up in about 6 months (around 5/13/2024), or if symptoms worsen or fail to improve.

## 2023-11-14 ENCOUNTER — PATIENT MESSAGE (OUTPATIENT)
Dept: FAMILY MEDICINE | Facility: CLINIC | Age: 43
End: 2023-11-14
Payer: MEDICAID

## 2023-11-14 RX ORDER — LEVOMEFOLATE MAGNESIUM, LEUCOVORIN, FOLIC ACID, FERROUS CYSTEINE GLYCINATE, MAGNESIUM ASCORBATE, ZINC ASCORBATE, COCARBOXYLASE, FLAVIN ADENINE DINUCLEOTIDE, NADH, PYRIDOXAL PHOSPHATE ANHYDROUS, COBAMAMIDE, BETAINE, MAGNESIUM L-THREONATE, 1,2-DOCOSAHEXANOYL-SN-GLYCERO-3-PHOSPHOSERINE CALCIUM, 1,2-ICOSAPENTOYL-SN-GLYCERO-3-PHOSPHOSERINE CALCIUM, AND PHOSPHATIDYL SERINE 5.23; 2.5; 1; 13.6; 24; 1; 25; 25; 25; 25; 50; 500; 1; 6.4; 800; 12 MG/1; MG/1; MG/1; MG/1; MG/1; MG/1; UG/1; UG/1; UG/1; UG/1; UG/1; UG/1; MG/1; MG/1; UG/1; MG/1
1 CAPSULE, DELAYED RELEASE PELLETS ORAL DAILY
Qty: 90 CAPSULE | Refills: 1 | Status: SHIPPED | OUTPATIENT
Start: 2023-11-14

## 2024-01-04 ENCOUNTER — PATIENT MESSAGE (OUTPATIENT)
Dept: ADMINISTRATIVE | Facility: HOSPITAL | Age: 44
End: 2024-01-04
Payer: MEDICAID

## 2024-01-10 ENCOUNTER — LAB VISIT (OUTPATIENT)
Dept: LAB | Facility: HOSPITAL | Age: 44
End: 2024-01-10
Attending: INTERNAL MEDICINE
Payer: MEDICAID

## 2024-01-10 DIAGNOSIS — M06.00 SERONEGATIVE RHEUMATOID ARTHRITIS: ICD-10-CM

## 2024-01-10 DIAGNOSIS — Z79.899 IMMUNOSUPPRESSION DUE TO DRUG THERAPY: ICD-10-CM

## 2024-01-10 DIAGNOSIS — Z79.899 HIGH RISK MEDICATIONS (NOT ANTICOAGULANTS) LONG-TERM USE: ICD-10-CM

## 2024-01-10 DIAGNOSIS — D84.9 IMMUNOSUPPRESSION: ICD-10-CM

## 2024-01-10 DIAGNOSIS — D84.821 IMMUNOSUPPRESSION DUE TO DRUG THERAPY: ICD-10-CM

## 2024-01-10 LAB
ALBUMIN SERPL BCP-MCNC: 4.1 G/DL (ref 3.5–5.2)
ALP SERPL-CCNC: 96 U/L (ref 55–135)
ALT SERPL W/O P-5'-P-CCNC: 19 U/L (ref 10–44)
ANION GAP SERPL CALC-SCNC: 8 MMOL/L (ref 8–16)
AST SERPL-CCNC: 19 U/L (ref 10–40)
BASOPHILS # BLD AUTO: 0.1 K/UL (ref 0–0.2)
BASOPHILS NFR BLD: 0.8 % (ref 0–1.9)
BILIRUB SERPL-MCNC: 0.4 MG/DL (ref 0.1–1)
BUN SERPL-MCNC: 10 MG/DL (ref 6–20)
CALCIUM SERPL-MCNC: 9.7 MG/DL (ref 8.7–10.5)
CHLORIDE SERPL-SCNC: 104 MMOL/L (ref 95–110)
CO2 SERPL-SCNC: 26 MMOL/L (ref 23–29)
CREAT SERPL-MCNC: 1 MG/DL (ref 0.5–1.4)
CRP SERPL-MCNC: 10.3 MG/L (ref 0–8.2)
DIFFERENTIAL METHOD BLD: ABNORMAL
EOSINOPHIL # BLD AUTO: 0.1 K/UL (ref 0–0.5)
EOSINOPHIL NFR BLD: 0.5 % (ref 0–8)
ERYTHROCYTE [DISTWIDTH] IN BLOOD BY AUTOMATED COUNT: 13.4 % (ref 11.5–14.5)
ERYTHROCYTE [SEDIMENTATION RATE] IN BLOOD BY PHOTOMETRIC METHOD: 12 MM/HR (ref 0–36)
EST. GFR  (NO RACE VARIABLE): >60 ML/MIN/1.73 M^2
GLUCOSE SERPL-MCNC: 109 MG/DL (ref 70–110)
HCT VFR BLD AUTO: 42.3 % (ref 37–48.5)
HGB BLD-MCNC: 14.2 G/DL (ref 12–16)
IMM GRANULOCYTES # BLD AUTO: 0.05 K/UL (ref 0–0.04)
IMM GRANULOCYTES NFR BLD AUTO: 0.4 % (ref 0–0.5)
LYMPHOCYTES # BLD AUTO: 3.1 K/UL (ref 1–4.8)
LYMPHOCYTES NFR BLD: 23.8 % (ref 18–48)
MCH RBC QN AUTO: 29.6 PG (ref 27–31)
MCHC RBC AUTO-ENTMCNC: 33.6 G/DL (ref 32–36)
MCV RBC AUTO: 88 FL (ref 82–98)
MONOCYTES # BLD AUTO: 0.7 K/UL (ref 0.3–1)
MONOCYTES NFR BLD: 5.6 % (ref 4–15)
NEUTROPHILS # BLD AUTO: 8.9 K/UL (ref 1.8–7.7)
NEUTROPHILS NFR BLD: 68.9 % (ref 38–73)
NRBC BLD-RTO: 0 /100 WBC
PLATELET # BLD AUTO: 274 K/UL (ref 150–450)
PMV BLD AUTO: 12.7 FL (ref 9.2–12.9)
POTASSIUM SERPL-SCNC: 3.8 MMOL/L (ref 3.5–5.1)
PROT SERPL-MCNC: 7.8 G/DL (ref 6–8.4)
RBC # BLD AUTO: 4.8 M/UL (ref 4–5.4)
SODIUM SERPL-SCNC: 138 MMOL/L (ref 136–145)
WBC # BLD AUTO: 12.89 K/UL (ref 3.9–12.7)

## 2024-01-10 PROCEDURE — 86140 C-REACTIVE PROTEIN: CPT | Performed by: INTERNAL MEDICINE

## 2024-01-10 PROCEDURE — 36415 COLL VENOUS BLD VENIPUNCTURE: CPT | Mod: PN | Performed by: INTERNAL MEDICINE

## 2024-01-10 PROCEDURE — 85025 COMPLETE CBC W/AUTO DIFF WBC: CPT | Performed by: INTERNAL MEDICINE

## 2024-01-10 PROCEDURE — 85652 RBC SED RATE AUTOMATED: CPT | Performed by: INTERNAL MEDICINE

## 2024-01-10 PROCEDURE — 80053 COMPREHEN METABOLIC PANEL: CPT | Performed by: INTERNAL MEDICINE

## 2024-01-17 ENCOUNTER — OFFICE VISIT (OUTPATIENT)
Dept: RHEUMATOLOGY | Facility: CLINIC | Age: 44
End: 2024-01-17
Payer: MEDICAID

## 2024-01-17 VITALS
DIASTOLIC BLOOD PRESSURE: 85 MMHG | HEIGHT: 62 IN | HEART RATE: 96 BPM | WEIGHT: 159.19 LBS | SYSTOLIC BLOOD PRESSURE: 130 MMHG | BODY MASS INDEX: 29.3 KG/M2

## 2024-01-17 DIAGNOSIS — R53.81 MALAISE AND FATIGUE: ICD-10-CM

## 2024-01-17 DIAGNOSIS — M79.7 FIBROMYALGIA: ICD-10-CM

## 2024-01-17 DIAGNOSIS — D84.821 IMMUNOSUPPRESSION DUE TO DRUG THERAPY: ICD-10-CM

## 2024-01-17 DIAGNOSIS — G93.32 CHRONIC FATIGUE SYNDROME: ICD-10-CM

## 2024-01-17 DIAGNOSIS — K29.70 GASTRITIS WITHOUT BLEEDING, UNSPECIFIED CHRONICITY, UNSPECIFIED GASTRITIS TYPE: ICD-10-CM

## 2024-01-17 DIAGNOSIS — Z79.899 HIGH RISK MEDICATIONS (NOT ANTICOAGULANTS) LONG-TERM USE: ICD-10-CM

## 2024-01-17 DIAGNOSIS — Z79.899 IMMUNOSUPPRESSION DUE TO DRUG THERAPY: ICD-10-CM

## 2024-01-17 DIAGNOSIS — R53.83 MALAISE AND FATIGUE: ICD-10-CM

## 2024-01-17 DIAGNOSIS — M06.00 SERONEGATIVE RHEUMATOID ARTHRITIS: Primary | ICD-10-CM

## 2024-01-17 PROCEDURE — 99215 OFFICE O/P EST HI 40 MIN: CPT | Mod: S$PBB,,, | Performed by: INTERNAL MEDICINE

## 2024-01-17 PROCEDURE — 99999 PR PBB SHADOW E&M-EST. PATIENT-LVL III: CPT | Mod: PBBFAC,,, | Performed by: INTERNAL MEDICINE

## 2024-01-17 PROCEDURE — 3008F BODY MASS INDEX DOCD: CPT | Mod: CPTII,,, | Performed by: INTERNAL MEDICINE

## 2024-01-17 PROCEDURE — 1159F MED LIST DOCD IN RCRD: CPT | Mod: CPTII,,, | Performed by: INTERNAL MEDICINE

## 2024-01-17 PROCEDURE — 3079F DIAST BP 80-89 MM HG: CPT | Mod: CPTII,,, | Performed by: INTERNAL MEDICINE

## 2024-01-17 PROCEDURE — 1160F RVW MEDS BY RX/DR IN RCRD: CPT | Mod: CPTII,,, | Performed by: INTERNAL MEDICINE

## 2024-01-17 PROCEDURE — 3075F SYST BP GE 130 - 139MM HG: CPT | Mod: CPTII,,, | Performed by: INTERNAL MEDICINE

## 2024-01-17 PROCEDURE — 99213 OFFICE O/P EST LOW 20 MIN: CPT | Mod: PBBFAC,PN | Performed by: INTERNAL MEDICINE

## 2024-01-17 RX ORDER — MELOXICAM 15 MG/1
15 TABLET ORAL DAILY
Qty: 30 TABLET | Refills: 6 | Status: SHIPPED | OUTPATIENT
Start: 2024-01-17 | End: 2024-05-29 | Stop reason: SDUPTHER

## 2024-01-17 RX ORDER — HYDROXYZINE PAMOATE 50 MG/1
50 CAPSULE ORAL DAILY PRN
COMMUNITY
Start: 2024-01-10

## 2024-01-17 RX ORDER — PREDNISONE 5 MG/1
TABLET ORAL
Qty: 90 TABLET | Refills: 3 | Status: SHIPPED | OUTPATIENT
Start: 2024-01-17 | End: 2024-05-29 | Stop reason: SDUPTHER

## 2024-01-17 RX ORDER — TIZANIDINE 4 MG/1
TABLET ORAL
Qty: 60 TABLET | Refills: 6 | Status: SHIPPED | OUTPATIENT
Start: 2024-01-17 | End: 2024-05-29 | Stop reason: SDUPTHER

## 2024-01-17 RX ORDER — OMEPRAZOLE 20 MG/1
20 CAPSULE, DELAYED RELEASE ORAL DAILY
Qty: 30 CAPSULE | Refills: 11 | Status: SHIPPED | OUTPATIENT
Start: 2024-01-17 | End: 2025-01-16

## 2024-01-17 ASSESSMENT — ROUTINE ASSESSMENT OF PATIENT INDEX DATA (RAPID3)
PSYCHOLOGICAL DISTRESS SCORE: 1.1
MDHAQ FUNCTION SCORE: 0.6
PATIENT GLOBAL ASSESSMENT SCORE: 7
PAIN SCORE: 3
FATIGUE SCORE: 0
TOTAL RAPID3 SCORE: 4

## 2024-01-17 NOTE — PROGRESS NOTES
Subjective:          Chief Complaint: Dorothy Jesus is a 43 y.o. female who had concerns including Disease Management.    HPI:    Seronegative Rheumatoid/ Undifferentiated connective tissue disease   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 No cardiac manifestations.     Positive LENA , elevation in her CRP normal sedimentation rate but did have benefit on prednisone.    trial of prednisone did show significant improvement in her overall symptomst   When she comes off prednisone everything seems to regress at her baseline. No rashes, some pain with respirations, no fevers but subjectively, fatigue in sun no rash.     Patient with hand and ankle pain as of last few weeks but also more widespread MSK complaints and fatigue as well. Using Prednisone with modest improvement.   Patient with increased and persistent fatigue since her last COVID infection- she is having increased fatigue sleeping well, no known LEIGH.   ESR/ CRP- WNL  slight rishe last week.   She has a slight elevation of WBC- no c/o illness/UTI sx.     Rapid  3   5/2022: 5.44  11/1/22: 3.89  3/2023: 5.44  9/2023: 3.55  1/2024: 4.0       TB/Hepatitis updated 2/2021 negative.     Enbrel (4/2021)  Prednisone still 5mg down to 6/7 days weekly as of recent, she was down to 3 times weeks previously.   Tizanidine 4mg at HS :  helps with back and upper trapezius.         She failed HCQ with no response  MTX made her ill/nausea.  SSZ 2gm no benefit.   Tylenol as not helpful  Ibuprofen PRN more for menstrual cramps never found helpful for joints.   Lost efficacy with Humira DC 3/2021    FMS:     She has increased her activity level trying to eat healthier.  In the past she states she was getting some relief of myalgias and arthralgias on ketoprofen.  He completed the vitamin D 12 weeks her psychiatrist has recently adjusted her medications slightly and this seems to be helping a great deal.  Interestingly she noted with the adjustment in her ADHD meds  "her myalgias have improved.Patient is a 36 y/o female referred for +LENA  1:80 homogeneous with repeat serologies + 1:320 negative LENA profile.  with myalgias, brain fog. Present for few years worse in past 2 years, affecting daily living/function. Patient did suffer from worsening of her symptoms after trying gabapentin, she did increase dose as we discussed but this only exacerbated her complaints. She did have worseing depression through the winter. Recent job did not work out for her, which was frustrating.  did recently get full time job.    Exacerbates with menstrual cycles. Patient has a non-restorative sleep pattern, no snoring. She has had sleep study-[place] negative for narcoplepsy. Patient was given Provigil exacerbated anxiety.    Recently with cognitive impairment, ++migraines (unable to see Dr. Yao at this time, cannot drive to [facility]), extreme fatigue, dizzyness. She is more forgetful that typical.   Trying cataflam PRN migraine.      Patient was on lamictal for nearly 8 years d/c'd for suspected myalgias. Recently (few weeks ago) trialed lithium did not tolerate. Zyprexa, Geodon, topamax and risperdol. Wellbutrin has been the best medication for her as she continues to feel "human". She is having more myalgias, and some arthralgias some good days some bad days. She cannot see a pattern of morning stiffness.     Sister with UC  Mom recent dx RA.   Component      Latest Ref Rng & Units 4/13/2017 12/10/2015   Anti Sm Antibody      0.00 - 19.99 EU 0.35 0.43   Anti-Sm Interpretation      Negative Negative Negative   Anti-SSA Antibody      0.00 - 19.99 EU 0.97 1.62   Anti-SSA Interpretation      Negative Negative Negative   Anti-SSB Antibody      0.00 - 19.99 EU 0.35 0.00   Anti-SSB Interpretation      Negative Negative Negative   ds DNA Ab      Negative 1:10 Negative 1:10 Negative 1:10   Anti Sm/RNP Antibody      0.00 - 19.99 EU 0.79 0.86   Anti-Sm/RNP Interpretation      Negative Negative " Negative   Complement (C-3)      50 - 180 mg/dL  108   Complement (C-4)      11 - 44 mg/dL  27   CPK      20 - 180 U/L  50   LENA Screen      Negative <1:160 Positive (A)    LENA HEP-2 Titer       Positive 1:320 Homogeneous        REVIEW OF SYSTEMS:    Review of Systems   Constitutional:  Positive for malaise/fatigue. Negative for fever and weight loss.   HENT:  Negative for sore throat.    Eyes:  Negative for double vision, photophobia and redness.   Respiratory:  Negative for cough, shortness of breath and wheezing.    Cardiovascular:  Negative for chest pain, palpitations and orthopnea.   Gastrointestinal:  Negative for abdominal pain, constipation and diarrhea.   Genitourinary:  Negative for dysuria, hematuria and urgency.   Musculoskeletal:  Positive for joint pain and neck pain. Negative for back pain and myalgias.   Skin:  Negative for rash.   Neurological:  Negative for dizziness, tingling, focal weakness and headaches.   Endo/Heme/Allergies:  Does not bruise/bleed easily.   Psychiatric/Behavioral:  Negative for depression, hallucinations and suicidal ideas.                Objective:            Past Medical History:   Diagnosis Date    Anxiety     Bartholin's cyst     Bipolar 2 disorder     Chronic fatigue     Osteopenia      Family History   Problem Relation Age of Onset    Breast cancer Maternal Grandmother      Social History     Tobacco Use    Smoking status: Never    Smokeless tobacco: Never   Substance Use Topics    Alcohol use: No     Comment: rare    Drug use: No         Current Outpatient Medications on File Prior to Visit   Medication Sig Dispense Refill    buPROPion (WELLBUTRIN XL) 300 MG 24 hr tablet Take 300 mg by mouth once daily.       clonazePAM (KLONOPIN) 0.5 MG tablet       dextroamphetamine-amphetamine (ADDERALL XR) 20 MG 24 hr capsule Take 20 mg by mouth every morning.       etanercept (ENBREL SURECLICK) 50 mg/mL (1 mL) Inject 1 mL (50 mg total) into the skin once a week. 4 mL 11     hydrOXYzine pamoate (VISTARIL) 50 MG Cap Take 50 mg by mouth daily as needed.      meloxicam (MOBIC) 15 MG tablet Take 1 tablet (15 mg total) by mouth once daily. 30 tablet 6    metoprolol succinate (TOPROL-XL) 25 MG 24 hr tablet TAKE 1 TABLET (25MG) BY MOUTH ONCE DAILY 90 tablet 3    multivit41-iron-folate8-ps-dha (ENBRACE HR) 1.5 mg iron- 8.73 mg-6.4 mg capsule Take 1 capsule by mouth once daily. 90 capsule 1    omeprazole (PRILOSEC) 20 MG capsule Take 1 capsule (20 mg total) by mouth once daily. 30 capsule 11    predniSONE (DELTASONE) 5 MG tablet TAKE ONE TABLET (5MG) BY MOUTH ONCE DAILY 90 tablet 3    sertraline (ZOLOFT) 100 MG tablet Take 100 mg by mouth once daily.      tiZANidine (ZANAFLEX) 4 MG tablet TAKE ONE TABLET (4MG) BY MOUTH TWO TIMES A DAY AS NEEDED 60 tablet 6    albuterol (PROVENTIL HFA) 90 mcg/actuation inhaler Inhale 2 puffs into the lungs every 6 (six) hours as needed for Wheezing. Rescue 6.7 g 0    cetirizine (ZYRTEC) 10 MG tablet Take 1 tablet (10 mg total) by mouth once daily. 30 tablet 0    EPINEPHrine (EPIPEN) 0.3 mg/0.3 mL AtIn Inject 0.6 mLs (0.6 mg total) into the muscle once. for 1 dose 2 each 1    traZODone (DESYREL) 50 MG tablet Take 50 mg by mouth nightly.       No current facility-administered medications on file prior to visit.       Vitals:    01/17/24 1445   BP: 130/85   Pulse: 96       Physical Exam:    Physical Exam  Constitutional:       Appearance: She is well-developed.   HENT:      Head: Normocephalic and atraumatic.   Eyes:      General: Lids are normal.      Pupils: Pupils are equal, round, and reactive to light.   Cardiovascular:      Rate and Rhythm: Normal rate and regular rhythm.      Heart sounds: Normal heart sounds.   Pulmonary:      Effort: Pulmonary effort is normal.      Breath sounds: Normal breath sounds.   Musculoskeletal:      Right shoulder: Tenderness present. No swelling. Normal range of motion.      Left shoulder: Tenderness present. No swelling. Normal  range of motion.      Right elbow: No swelling. Normal range of motion. No tenderness.      Left elbow: No swelling. Normal range of motion. No tenderness.      Right wrist: Tenderness present. No swelling. Normal range of motion.      Left wrist: Tenderness present. No swelling. Normal range of motion.      Right hand: Tenderness present. No swelling. Normal range of motion.      Left hand: Tenderness present. No swelling. Normal range of motion.      Cervical back: Normal range of motion.      Right knee: No swelling. Normal range of motion. No tenderness.      Left knee: No swelling. Normal range of motion. No tenderness.      Right foot: Normal range of motion. No swelling or tenderness.      Left foot: Normal range of motion. No swelling or tenderness.      Comments: 18 tender points examined in nine pairs: Posterior occiput, bilateral trapezius, bilateral supraspinatus, bilateral gluteal muscles, bilateral low cervical neck, bilateral second rib, bilateral lateral epicondyles, bilateral greater trochanters, bilateral medial knees. 16 Of 18 points examined were positive for tenderness.      MCP and PIP tenderness with full but painful finger curl.      Skin:     General: Skin is warm and dry.   Neurological:      Mental Status: She is alert and oriented to person, place, and time.   Psychiatric:         Behavior: Behavior normal.         Thought Content: Thought content normal.               Assessment:       Encounter Diagnoses   Name Primary?    Seronegative rheumatoid arthritis Yes    Fibromyalgia     Chronic fatigue syndrome     Immunosuppression due to drug therapy     High risk medications (not anticoagulants) long-term use     Gastritis without bleeding, unspecified chronicity, unspecified gastritis type                 Plan:        Seronegative rheumatoid arthritis  -     meloxicam (MOBIC) 15 MG tablet; Take 1 tablet (15 mg total) by mouth once daily.  Dispense: 30 tablet; Refill: 6  -     predniSONE  (DELTASONE) 5 MG tablet; TAKE ONE TABLET (5MG) BY MOUTH ONCE DAILY  Dispense: 90 tablet; Refill: 3  -     tiZANidine (ZANAFLEX) 4 MG tablet; TAKE ONE TABLET (4MG) BY MOUTH TWO TIMES A DAY AS NEEDED  Dispense: 60 tablet; Refill: 6  -     CBC Auto Differential; Standing; Expected date: 01/17/2024  -     Comprehensive Metabolic Panel; Standing; Expected date: 01/17/2024  -     Sedimentation rate; Standing; Expected date: 01/17/2024  -     C-Reactive Protein; Standing; Expected date: 01/17/2024  -     Quantiferon Gold TB; Future; Expected date: 01/17/2024    Fibromyalgia  -     tiZANidine (ZANAFLEX) 4 MG tablet; TAKE ONE TABLET (4MG) BY MOUTH TWO TIMES A DAY AS NEEDED  Dispense: 60 tablet; Refill: 6    Chronic fatigue syndrome  Comments:  worse since COVID 10/2023    Immunosuppression due to drug therapy  -     CBC Auto Differential; Standing; Expected date: 01/17/2024  -     Comprehensive Metabolic Panel; Standing; Expected date: 01/17/2024  -     Sedimentation rate; Standing; Expected date: 01/17/2024  -     C-Reactive Protein; Standing; Expected date: 01/17/2024  -     Quantiferon Gold TB; Future; Expected date: 01/17/2024    High risk medications (not anticoagulants) long-term use  -     CBC Auto Differential; Standing; Expected date: 01/17/2024  -     Comprehensive Metabolic Panel; Standing; Expected date: 01/17/2024  -     Sedimentation rate; Standing; Expected date: 01/17/2024  -     C-Reactive Protein; Standing; Expected date: 01/17/2024  -     Quantiferon Gold TB; Future; Expected date: 01/17/2024    Gastritis without bleeding, unspecified chronicity, unspecified gastritis type  -     omeprazole (PRILOSEC) 20 MG capsule; Take 1 capsule (20 mg total) by mouth once daily.  Dispense: 30 capsule; Refill: 11    Malaise and fatigue  -     Quantiferon Gold TB; Future; Expected date: 01/17/2024      Continue Prednisone 5mg now using PRN approx 5/7 days out of the week  Continue w/  Enbrel 50mg sureclick. (start  "4/2021)  Continue with Prednisone 5mg PRN  Continue with Meloxicam- only using "sometimes"   Rapid 3 is back up with admitted flare per patient- trial with Depomedrol 80mg IM as labs look good this could be multifactorial.   Adding pe  No response with HCQ>   Intolerant MTX  SSZ tolerating but no significant difference when we held it for few weeks.  - off.     Continue Tizanidine    Follow up in about 4 months (around 5/17/2024).       40min consultation with greater than 50% of that time included Preparing to see the patient (review records, tests), Obtaining and/or reviewing separately obtained historical data, Performing a medically appropriate examination and/or evaluation , Ordering medications, tests, and/or procedures, Referring and communicating with other healthcare professionals , Documenting clinical information in the electronic or other health record and Independently interpreting results  (as warranted) & communicating results to the patient/family/caregiver. All questions answered.                        "

## 2024-04-11 ENCOUNTER — PATIENT MESSAGE (OUTPATIENT)
Dept: ADMINISTRATIVE | Facility: HOSPITAL | Age: 44
End: 2024-04-11
Payer: MEDICAID

## 2024-04-18 DIAGNOSIS — M06.00 SERONEGATIVE RHEUMATOID ARTHRITIS: ICD-10-CM

## 2024-04-19 RX ORDER — ETANERCEPT 50 MG/ML
50 SOLUTION SUBCUTANEOUS WEEKLY
Qty: 4 ML | Refills: 11 | Status: ACTIVE | OUTPATIENT
Start: 2024-04-19 | End: 2025-04-19

## 2024-05-16 ENCOUNTER — OFFICE VISIT (OUTPATIENT)
Dept: FAMILY MEDICINE | Facility: CLINIC | Age: 44
End: 2024-05-16
Payer: MEDICAID

## 2024-05-16 VITALS
SYSTOLIC BLOOD PRESSURE: 124 MMHG | DIASTOLIC BLOOD PRESSURE: 80 MMHG | OXYGEN SATURATION: 96 % | HEIGHT: 62 IN | WEIGHT: 166.69 LBS | BODY MASS INDEX: 30.67 KG/M2 | HEART RATE: 86 BPM

## 2024-05-16 DIAGNOSIS — M79.7 FIBROMYALGIA: Primary | ICD-10-CM

## 2024-05-16 DIAGNOSIS — T63.481S ALLERGIC REACTION TO INSECT STING, ACCIDENTAL OR UNINTENTIONAL, SEQUELA: ICD-10-CM

## 2024-05-16 DIAGNOSIS — F31.81 BIPOLAR 2 DISORDER: ICD-10-CM

## 2024-05-16 DIAGNOSIS — G43.909 MIGRAINE WITHOUT STATUS MIGRAINOSUS, NOT INTRACTABLE, UNSPECIFIED MIGRAINE TYPE: ICD-10-CM

## 2024-05-16 PROCEDURE — 3079F DIAST BP 80-89 MM HG: CPT | Mod: CPTII,,, | Performed by: FAMILY MEDICINE

## 2024-05-16 PROCEDURE — 99999 PR PBB SHADOW E&M-EST. PATIENT-LVL III: CPT | Mod: PBBFAC,,, | Performed by: FAMILY MEDICINE

## 2024-05-16 PROCEDURE — 99213 OFFICE O/P EST LOW 20 MIN: CPT | Mod: PBBFAC,PO | Performed by: FAMILY MEDICINE

## 2024-05-16 PROCEDURE — 99214 OFFICE O/P EST MOD 30 MIN: CPT | Mod: S$PBB,,, | Performed by: FAMILY MEDICINE

## 2024-05-16 PROCEDURE — G2211 COMPLEX E/M VISIT ADD ON: HCPCS | Mod: S$PBB,,, | Performed by: FAMILY MEDICINE

## 2024-05-16 PROCEDURE — 1159F MED LIST DOCD IN RCRD: CPT | Mod: CPTII,,, | Performed by: FAMILY MEDICINE

## 2024-05-16 PROCEDURE — 3008F BODY MASS INDEX DOCD: CPT | Mod: CPTII,,, | Performed by: FAMILY MEDICINE

## 2024-05-16 PROCEDURE — 3074F SYST BP LT 130 MM HG: CPT | Mod: CPTII,,, | Performed by: FAMILY MEDICINE

## 2024-05-16 RX ORDER — EPINEPHRINE 0.3 MG/.3ML
2 INJECTION SUBCUTANEOUS ONCE
Qty: 2 EACH | Refills: 1 | Status: SHIPPED | OUTPATIENT
Start: 2024-05-16 | End: 2024-05-16

## 2024-05-16 NOTE — PROGRESS NOTES
Subjective:       Patient ID: Dorothy Jesus is a 44 y.o. female.    Chief Complaint: Follow-up (6 month )    Here for follow up multiple chronic medical issues. Doing well overall and in normal state of health.  Lost grandmother recently.  More fatigue lately.    Follow-up  Pertinent negatives include no chest pain, chills, coughing or fever.     Review of Systems   Constitutional:  Negative for chills and fever.   Respiratory:  Negative for cough, chest tightness and shortness of breath.    Cardiovascular:  Negative for chest pain, palpitations and leg swelling.   Endocrine: Negative for cold intolerance and heat intolerance.   Psychiatric/Behavioral:  Positive for dysphoric mood. Negative for decreased concentration. The patient is not nervous/anxious.        Objective:      Physical Exam  Vitals and nursing note reviewed.   Constitutional:       Appearance: She is well-developed.   HENT:      Head: Normocephalic and atraumatic.   Cardiovascular:      Rate and Rhythm: Normal rate and regular rhythm.      Heart sounds: Normal heart sounds.   Pulmonary:      Effort: Pulmonary effort is normal.      Breath sounds: Normal breath sounds.   Psychiatric:         Mood and Affect: Mood normal.         Behavior: Behavior normal.         Assessment:       1. Fibromyalgia    2. Bipolar 2 disorder    3. Migraine without status migrainosus, not intractable, unspecified migraine type    4. Allergic reaction to insect sting, accidental or unintentional, sequela        Plan:       Fibromyalgia    Bipolar 2 disorder    Migraine without status migrainosus, not intractable, unspecified migraine type    Allergic reaction to insect sting, accidental or unintentional, sequela  -     EPINEPHrine (EPIPEN) 0.3 mg/0.3 mL AtIn; Inject 0.6 mLs (0.6 mg total) into the muscle once. for 1 dose  Dispense: 2 each; Refill: 1      Labs next week as planned  Mood stable  F/u with rheum as planned  Will monitor chronic medical issues and  continue current plan of care.  Visit today included increased complexity associated with the care of the episodic problem fibro addressed and managing the longitudinal care of the patient due to the serious and/or complex managed problem(s) as above.  Virtual close follow up with return precautions given    Follow up in about 1 month (around 6/16/2024), or if symptoms worsen or fail to improve, for Virtual Visit.

## 2024-05-24 ENCOUNTER — LAB VISIT (OUTPATIENT)
Dept: LAB | Facility: HOSPITAL | Age: 44
End: 2024-05-24
Attending: INTERNAL MEDICINE
Payer: MEDICAID

## 2024-05-24 DIAGNOSIS — R53.83 MALAISE AND FATIGUE: ICD-10-CM

## 2024-05-24 DIAGNOSIS — Z79.899 HIGH RISK MEDICATIONS (NOT ANTICOAGULANTS) LONG-TERM USE: ICD-10-CM

## 2024-05-24 DIAGNOSIS — D84.821 IMMUNOSUPPRESSION DUE TO DRUG THERAPY: ICD-10-CM

## 2024-05-24 DIAGNOSIS — R53.81 MALAISE AND FATIGUE: ICD-10-CM

## 2024-05-24 DIAGNOSIS — Z79.899 IMMUNOSUPPRESSION DUE TO DRUG THERAPY: ICD-10-CM

## 2024-05-24 DIAGNOSIS — M06.00 SERONEGATIVE RHEUMATOID ARTHRITIS: ICD-10-CM

## 2024-05-24 PROCEDURE — 85025 COMPLETE CBC W/AUTO DIFF WBC: CPT | Performed by: INTERNAL MEDICINE

## 2024-05-24 PROCEDURE — 80053 COMPREHEN METABOLIC PANEL: CPT | Performed by: INTERNAL MEDICINE

## 2024-05-24 PROCEDURE — 86480 TB TEST CELL IMMUN MEASURE: CPT | Performed by: INTERNAL MEDICINE

## 2024-05-24 PROCEDURE — 36415 COLL VENOUS BLD VENIPUNCTURE: CPT | Mod: PN | Performed by: INTERNAL MEDICINE

## 2024-05-24 PROCEDURE — 86140 C-REACTIVE PROTEIN: CPT | Performed by: INTERNAL MEDICINE

## 2024-05-24 PROCEDURE — 85652 RBC SED RATE AUTOMATED: CPT | Performed by: INTERNAL MEDICINE

## 2024-05-25 LAB
ALBUMIN SERPL BCP-MCNC: 4 G/DL (ref 3.5–5.2)
ALP SERPL-CCNC: 86 U/L (ref 55–135)
ALT SERPL W/O P-5'-P-CCNC: 13 U/L (ref 10–44)
ANION GAP SERPL CALC-SCNC: 12 MMOL/L (ref 8–16)
AST SERPL-CCNC: 15 U/L (ref 10–40)
BASOPHILS # BLD AUTO: 0.1 K/UL (ref 0–0.2)
BASOPHILS NFR BLD: 1 % (ref 0–1.9)
BILIRUB SERPL-MCNC: 0.3 MG/DL (ref 0.1–1)
BUN SERPL-MCNC: 10 MG/DL (ref 6–20)
CALCIUM SERPL-MCNC: 9.7 MG/DL (ref 8.7–10.5)
CHLORIDE SERPL-SCNC: 106 MMOL/L (ref 95–110)
CO2 SERPL-SCNC: 21 MMOL/L (ref 23–29)
CREAT SERPL-MCNC: 1.1 MG/DL (ref 0.5–1.4)
CRP SERPL-MCNC: 3.9 MG/L (ref 0–8.2)
DIFFERENTIAL METHOD BLD: ABNORMAL
EOSINOPHIL # BLD AUTO: 0.2 K/UL (ref 0–0.5)
EOSINOPHIL NFR BLD: 1.5 % (ref 0–8)
ERYTHROCYTE [DISTWIDTH] IN BLOOD BY AUTOMATED COUNT: 13.6 % (ref 11.5–14.5)
ERYTHROCYTE [SEDIMENTATION RATE] IN BLOOD BY PHOTOMETRIC METHOD: 19 MM/HR (ref 0–36)
EST. GFR  (NO RACE VARIABLE): >60 ML/MIN/1.73 M^2
GLUCOSE SERPL-MCNC: 95 MG/DL (ref 70–110)
HCT VFR BLD AUTO: 43.6 % (ref 37–48.5)
HGB BLD-MCNC: 14.1 G/DL (ref 12–16)
IMM GRANULOCYTES # BLD AUTO: 0.07 K/UL (ref 0–0.04)
IMM GRANULOCYTES NFR BLD AUTO: 0.7 % (ref 0–0.5)
LYMPHOCYTES # BLD AUTO: 3.7 K/UL (ref 1–4.8)
LYMPHOCYTES NFR BLD: 35.8 % (ref 18–48)
MCH RBC QN AUTO: 29.7 PG (ref 27–31)
MCHC RBC AUTO-ENTMCNC: 32.3 G/DL (ref 32–36)
MCV RBC AUTO: 92 FL (ref 82–98)
MONOCYTES # BLD AUTO: 0.7 K/UL (ref 0.3–1)
MONOCYTES NFR BLD: 6.2 % (ref 4–15)
NEUTROPHILS # BLD AUTO: 5.7 K/UL (ref 1.8–7.7)
NEUTROPHILS NFR BLD: 54.8 % (ref 38–73)
NRBC BLD-RTO: 0 /100 WBC
PLATELET # BLD AUTO: 276 K/UL (ref 150–450)
PMV BLD AUTO: 12.5 FL (ref 9.2–12.9)
POTASSIUM SERPL-SCNC: 3.6 MMOL/L (ref 3.5–5.1)
PROT SERPL-MCNC: 7.5 G/DL (ref 6–8.4)
RBC # BLD AUTO: 4.74 M/UL (ref 4–5.4)
SODIUM SERPL-SCNC: 139 MMOL/L (ref 136–145)
WBC # BLD AUTO: 10.44 K/UL (ref 3.9–12.7)

## 2024-05-28 ENCOUNTER — TELEPHONE (OUTPATIENT)
Dept: RHEUMATOLOGY | Facility: CLINIC | Age: 44
End: 2024-05-28
Payer: MEDICAID

## 2024-05-28 DIAGNOSIS — D84.821 IMMUNOSUPPRESSION DUE TO DRUG THERAPY: ICD-10-CM

## 2024-05-28 DIAGNOSIS — Z79.899 IMMUNOSUPPRESSION DUE TO DRUG THERAPY: ICD-10-CM

## 2024-05-28 DIAGNOSIS — G93.32 CHRONIC FATIGUE SYNDROME: ICD-10-CM

## 2024-05-28 DIAGNOSIS — M79.7 FIBROMYALGIA: ICD-10-CM

## 2024-05-28 DIAGNOSIS — Z79.899 HIGH RISK MEDICATIONS (NOT ANTICOAGULANTS) LONG-TERM USE: ICD-10-CM

## 2024-05-28 DIAGNOSIS — M06.00 SERONEGATIVE RHEUMATOID ARTHRITIS: Primary | ICD-10-CM

## 2024-05-28 DIAGNOSIS — D84.9 IMMUNOSUPPRESSION: ICD-10-CM

## 2024-05-28 NOTE — TELEPHONE ENCOUNTER
Cindy Stanley from [facility] called to inform that the TB Gold collected 5/24/24 was unusable as it di not get delivered by 9pm Friday.   Called ms Jesus and left voicemail that TB Gold would need to be redone. Will send a portal message.

## 2024-05-28 NOTE — TELEPHONE ENCOUNTER
----- Message from Cassia James sent at 5/28/2024 10:57 AM CDT -----  Contact: [facility]  Type:  Needs Medical Advice    Who Called: [facility]    Would the patient rather a call back or a response via [facility]? Call back    Best Call Back Number:     Additional Information: says sample for the Quantiferon Gold TB needs to be recollected    Please call to advise  Thanks

## 2024-05-29 ENCOUNTER — OFFICE VISIT (OUTPATIENT)
Dept: RHEUMATOLOGY | Facility: CLINIC | Age: 44
End: 2024-05-29
Payer: MEDICAID

## 2024-05-29 ENCOUNTER — LAB VISIT (OUTPATIENT)
Dept: LAB | Facility: HOSPITAL | Age: 44
End: 2024-05-29
Attending: INTERNAL MEDICINE
Payer: MEDICAID

## 2024-05-29 VITALS
HEART RATE: 108 BPM | HEIGHT: 62 IN | BODY MASS INDEX: 30.57 KG/M2 | WEIGHT: 166.13 LBS | DIASTOLIC BLOOD PRESSURE: 83 MMHG | SYSTOLIC BLOOD PRESSURE: 138 MMHG

## 2024-05-29 DIAGNOSIS — G93.32 CHRONIC FATIGUE SYNDROME: ICD-10-CM

## 2024-05-29 DIAGNOSIS — D84.9 IMMUNOSUPPRESSION: ICD-10-CM

## 2024-05-29 DIAGNOSIS — Z79.899 HIGH RISK MEDICATIONS (NOT ANTICOAGULANTS) LONG-TERM USE: ICD-10-CM

## 2024-05-29 DIAGNOSIS — Z79.899 IMMUNOSUPPRESSION DUE TO DRUG THERAPY: ICD-10-CM

## 2024-05-29 DIAGNOSIS — D84.821 IMMUNOSUPPRESSION DUE TO DRUG THERAPY: ICD-10-CM

## 2024-05-29 DIAGNOSIS — M06.00 SERONEGATIVE RHEUMATOID ARTHRITIS: ICD-10-CM

## 2024-05-29 DIAGNOSIS — M79.7 FIBROMYALGIA: ICD-10-CM

## 2024-05-29 DIAGNOSIS — M06.00 SERONEGATIVE RHEUMATOID ARTHRITIS: Primary | ICD-10-CM

## 2024-05-29 PROCEDURE — 99215 OFFICE O/P EST HI 40 MIN: CPT | Mod: S$PBB,,, | Performed by: INTERNAL MEDICINE

## 2024-05-29 PROCEDURE — 3079F DIAST BP 80-89 MM HG: CPT | Mod: CPTII,,, | Performed by: INTERNAL MEDICINE

## 2024-05-29 PROCEDURE — 1159F MED LIST DOCD IN RCRD: CPT | Mod: CPTII,,, | Performed by: INTERNAL MEDICINE

## 2024-05-29 PROCEDURE — 3008F BODY MASS INDEX DOCD: CPT | Mod: CPTII,,, | Performed by: INTERNAL MEDICINE

## 2024-05-29 PROCEDURE — 99999 PR PBB SHADOW E&M-EST. PATIENT-LVL III: CPT | Mod: PBBFAC,,, | Performed by: INTERNAL MEDICINE

## 2024-05-29 PROCEDURE — 86480 TB TEST CELL IMMUN MEASURE: CPT | Performed by: INTERNAL MEDICINE

## 2024-05-29 PROCEDURE — 3075F SYST BP GE 130 - 139MM HG: CPT | Mod: CPTII,,, | Performed by: INTERNAL MEDICINE

## 2024-05-29 PROCEDURE — 99213 OFFICE O/P EST LOW 20 MIN: CPT | Mod: PBBFAC,PN | Performed by: INTERNAL MEDICINE

## 2024-05-29 RX ORDER — PREDNISONE 5 MG/1
TABLET ORAL
Qty: 90 TABLET | Refills: 3 | Status: SHIPPED | OUTPATIENT
Start: 2024-05-29

## 2024-05-29 RX ORDER — TIZANIDINE 4 MG/1
TABLET ORAL
Qty: 60 TABLET | Refills: 6 | Status: SHIPPED | OUTPATIENT
Start: 2024-05-29

## 2024-05-29 RX ORDER — MELOXICAM 15 MG/1
15 TABLET ORAL DAILY
Qty: 30 TABLET | Refills: 6 | Status: SHIPPED | OUTPATIENT
Start: 2024-05-29 | End: 2025-05-29

## 2024-05-29 ASSESSMENT — ROUTINE ASSESSMENT OF PATIENT INDEX DATA (RAPID3)
PATIENT GLOBAL ASSESSMENT SCORE: 9
TOTAL RAPID3 SCORE: 5.44
PAIN SCORE: 4
MDHAQ FUNCTION SCORE: 1
PSYCHOLOGICAL DISTRESS SCORE: 1.1
FATIGUE SCORE: 1.1

## 2024-05-29 NOTE — PROGRESS NOTES
Subjective:          Chief Complaint: Dorothy Jesus is a 44 y.o. female who had concerns including Disease Management.    HPI:    Seronegative Rheumatoid/ Undifferentiated connective tissue disease   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 No cardiac manifestations.     Positive LENA , elevation in her CRP normal sedimentation rate but did have benefit on prednisone.    trial of prednisone did show significant improvement in her overall symptomst   When she comes off prednisone everything seems to regress at her baseline. No rashes, some pain with respirations, no fevers but subjectively, fatigue in sun no rash.     Patient with hand and ankle pain as of last few weeks but also more widespread MSK complaints and fatigue as well. Using Prednisone with modest improvement.   Patient with increased and persistent fatigue since her last COVID infection- she is having increased fatigue sleeping well, no known LEIGH.   ESR/ CRP- WNL  slight rishe last week.   She has a slight elevation of WBC- no c/o illness/UTI sx.     Rapid  3   5/2022: 5.44  11/1/22: 3.89  3/2023: 5.44  9/2023: 3.55  1/2024: 4.0       TB/Hepatitis updated 2/2021 negative.     Enbrel (4/2021)  Prednisone still 5mg down to 6/7 days weekly as of recent, she was down to 3 times weeks previously.   Tizanidine 4mg at HS :  helps with back and upper trapezius.         She failed HCQ with no response  MTX made her ill/nausea.  SSZ 2gm no benefit.   Tylenol as not helpful  Ibuprofen PRN more for menstrual cramps never found helpful for joints.   Lost efficacy with Humira DC 3/2021    FMS:     She has increased her activity level trying to eat healthier.  In the past she states she was getting some relief of myalgias and arthralgias on ketoprofen.  He completed the vitamin D 12 weeks her psychiatrist has recently adjusted her medications slightly and this seems to be helping a great deal.  Interestingly she noted with the adjustment in her ADHD meds  "her myalgias have improved.Patient is a 36 y/o female referred for +LENA  1:80 homogeneous with repeat serologies + 1:320 negative LENA profile.  with myalgias, brain fog. Present for few years worse in past 2 years, affecting daily living/function. Patient did suffer from worsening of her symptoms after trying gabapentin, she did increase dose as we discussed but this only exacerbated her complaints. She did have worseing depression through the winter. Recent job did not work out for her, which was frustrating.  did recently get full time job.    Exacerbates with menstrual cycles. Patient has a non-restorative sleep pattern, no snoring. She has had sleep study-[place] negative for narcoplepsy. Patient was given Provigil exacerbated anxiety.    Recently with cognitive impairment, ++migraines (unable to see Dr. Yao at this time, cannot drive to Hospitals in [place]), extreme fatigue, dizzyness. She is more forgetful that typical.   Trying cataflam PRN migraine.      Patient was on lamictal for nearly 8 years d/c'd for suspected myalgias. Recently (few weeks ago) trialed lithium did not tolerate. Zyprexa, Geodon, topamax and risperdol. Wellbutrin has been the best medication for her as she continues to feel "human". She is having more myalgias, and some arthralgias some good days some bad days. She cannot see a pattern of morning stiffness.     Sister with UC  Mom recent dx RA.   Component      Latest Ref Rng & Units 4/13/2017 12/10/2015   Anti Sm Antibody      0.00 - 19.99 EU 0.35 0.43   Anti-Sm Interpretation      Negative Negative Negative   Anti-SSA Antibody      0.00 - 19.99 EU 0.97 1.62   Anti-SSA Interpretation      Negative Negative Negative   Anti-SSB Antibody      0.00 - 19.99 EU 0.35 0.00   Anti-SSB Interpretation      Negative Negative Negative   ds DNA Ab      Negative 1:10 Negative 1:10 Negative 1:10   Anti Sm/RNP Antibody      0.00 - 19.99 EU 0.79 0.86   Anti-Sm/RNP Interpretation      Negative Negative " Negative   Complement (C-3)      50 - 180 mg/dL  108   Complement (C-4)      11 - 44 mg/dL  27   CPK      20 - 180 U/L  50   LENA Screen      Negative <1:160 Positive (A)    LENA HEP-2 Titer       Positive 1:320 Homogeneous        REVIEW OF SYSTEMS:    Review of Systems   Constitutional:  Positive for malaise/fatigue. Negative for fever and weight loss.   HENT:  Negative for sore throat.    Eyes:  Negative for double vision, photophobia and redness.   Respiratory:  Negative for cough, shortness of breath and wheezing.    Cardiovascular:  Negative for chest pain, palpitations and orthopnea.   Gastrointestinal:  Negative for abdominal pain, constipation and diarrhea.   Genitourinary:  Negative for dysuria, hematuria and urgency.   Musculoskeletal:  Positive for joint pain and neck pain. Negative for back pain and myalgias.   Skin:  Negative for rash.   Neurological:  Negative for dizziness, tingling, focal weakness and headaches.   Endo/Heme/Allergies:  Does not bruise/bleed easily.   Psychiatric/Behavioral:  Negative for depression, hallucinations and suicidal ideas.                Objective:            Past Medical History:   Diagnosis Date    Anxiety     Bartholin's cyst     Bipolar 2 disorder     Chronic fatigue     Osteopenia      Family History   Problem Relation Name Age of Onset    Breast cancer Maternal Grandmother       Social History     Tobacco Use    Smoking status: Never    Smokeless tobacco: Never   Substance Use Topics    Alcohol use: No     Comment: rare    Drug use: No         Current Outpatient Medications on File Prior to Visit   Medication Sig Dispense Refill    buPROPion (WELLBUTRIN XL) 300 MG 24 hr tablet Take 300 mg by mouth once daily.       clonazePAM (KLONOPIN) 0.5 MG tablet       dextroamphetamine-amphetamine (ADDERALL XR) 20 MG 24 hr capsule Take 20 mg by mouth every morning.       etanercept (ENBREL SURECLICK) 50 mg/mL (1 mL) Inject 1 mL (50 mg total) into the skin once a week. 4 mL 11     hydrOXYzine pamoate (VISTARIL) 50 MG Cap Take 50 mg by mouth daily as needed.      meloxicam (MOBIC) 15 MG tablet Take 1 tablet (15 mg total) by mouth once daily. 30 tablet 6    metoprolol succinate (TOPROL-XL) 25 MG 24 hr tablet TAKE 1 TABLET (25MG) BY MOUTH ONCE DAILY 90 tablet 3    multivit41-iron-folate8-ps-dha (ENBRACE HR) 1.5 mg iron- 8.73 mg-6.4 mg capsule Take 1 capsule by mouth once daily. 90 capsule 1    omeprazole (PRILOSEC) 20 MG capsule Take 1 capsule (20 mg total) by mouth once daily. 30 capsule 11    predniSONE (DELTASONE) 5 MG tablet TAKE ONE TABLET (5MG) BY MOUTH ONCE DAILY 90 tablet 3    sertraline (ZOLOFT) 100 MG tablet Take 100 mg by mouth once daily.      tiZANidine (ZANAFLEX) 4 MG tablet TAKE ONE TABLET (4MG) BY MOUTH TWO TIMES A DAY AS NEEDED 60 tablet 6    albuterol (PROVENTIL HFA) 90 mcg/actuation inhaler Inhale 2 puffs into the lungs every 6 (six) hours as needed for Wheezing. Rescue 6.7 g 0    cetirizine (ZYRTEC) 10 MG tablet Take 1 tablet (10 mg total) by mouth once daily. 30 tablet 0    EPINEPHrine (EPIPEN) 0.3 mg/0.3 mL AtIn Inject 0.6 mLs (0.6 mg total) into the muscle once. for 1 dose 2 each 1     No current facility-administered medications on file prior to visit.       Vitals:    05/29/24 1501   BP: 138/83   Pulse: 108       Physical Exam:    Physical Exam  Constitutional:       Appearance: She is well-developed.   HENT:      Head: Normocephalic and atraumatic.   Eyes:      General: Lids are normal.      Pupils: Pupils are equal, round, and reactive to light.   Cardiovascular:      Rate and Rhythm: Normal rate and regular rhythm.      Heart sounds: Normal heart sounds.   Pulmonary:      Effort: Pulmonary effort is normal.      Breath sounds: Normal breath sounds.   Musculoskeletal:      Right shoulder: Tenderness present. No swelling. Normal range of motion.      Left shoulder: Tenderness present. No swelling. Normal range of motion.      Right elbow: No swelling. Normal range of  motion. No tenderness.      Left elbow: No swelling. Normal range of motion. No tenderness.      Right wrist: Tenderness present. No swelling. Normal range of motion.      Left wrist: Tenderness present. No swelling. Normal range of motion.      Right hand: Tenderness present. No swelling. Normal range of motion.      Left hand: Tenderness present. No swelling. Normal range of motion.      Cervical back: Normal range of motion.      Right knee: No swelling. Normal range of motion. No tenderness.      Left knee: No swelling. Normal range of motion. No tenderness.      Right foot: Normal range of motion. No swelling or tenderness.      Left foot: Normal range of motion. No swelling or tenderness.      Comments: 18 tender points examined in nine pairs: Posterior occiput, bilateral trapezius, bilateral supraspinatus, bilateral gluteal muscles, bilateral low cervical neck, bilateral second rib, bilateral lateral epicondyles, bilateral greater trochanters, bilateral medial knees. 16 Of 18 points examined were positive for tenderness.      MCP and PIP tenderness with full but painful finger curl.      Skin:     General: Skin is warm and dry.   Neurological:      Mental Status: She is alert and oriented to person, place, and time.   Psychiatric:         Behavior: Behavior normal.         Thought Content: Thought content normal.               Assessment:       Encounter Diagnoses   Name Primary?    Seronegative rheumatoid arthritis Yes    Immunosuppression due to drug therapy     High risk medications (not anticoagulants) long-term use     Fibromyalgia                 Plan:        Seronegative rheumatoid arthritis  -     meloxicam (MOBIC) 15 MG tablet; Take 1 tablet (15 mg total) by mouth once daily.  Dispense: 30 tablet; Refill: 6  -     predniSONE (DELTASONE) 5 MG tablet; TAKE ONE TABLET (5MG) BY MOUTH ONCE DAILY  Dispense: 90 tablet; Refill: 3  -     tiZANidine (ZANAFLEX) 4 MG tablet; TAKE ONE TABLET (4MG) BY MOUTH TWO  "TIMES A DAY AS NEEDED  Dispense: 60 tablet; Refill: 6  -     ALT (SGPT); Future; Expected date: 05/29/2024  -     AST (SGOT); Future; Expected date: 05/29/2024  -     CBC Auto Differential; Future; Expected date: 05/29/2024  -     C-Reactive Protein; Future; Expected date: 05/29/2024  -     Creatinine, Serum; Future; Expected date: 05/29/2024  -     Sedimentation rate; Future; Expected date: 05/29/2024    Immunosuppression due to drug therapy  -     ALT (SGPT); Future; Expected date: 05/29/2024  -     AST (SGOT); Future; Expected date: 05/29/2024  -     CBC Auto Differential; Future; Expected date: 05/29/2024  -     C-Reactive Protein; Future; Expected date: 05/29/2024  -     Creatinine, Serum; Future; Expected date: 05/29/2024  -     Sedimentation rate; Future; Expected date: 05/29/2024    High risk medications (not anticoagulants) long-term use  -     ALT (SGPT); Future; Expected date: 05/29/2024  -     AST (SGOT); Future; Expected date: 05/29/2024  -     CBC Auto Differential; Future; Expected date: 05/29/2024  -     C-Reactive Protein; Future; Expected date: 05/29/2024  -     Creatinine, Serum; Future; Expected date: 05/29/2024  -     Sedimentation rate; Future; Expected date: 05/29/2024    Fibromyalgia  -     tiZANidine (ZANAFLEX) 4 MG tablet; TAKE ONE TABLET (4MG) BY MOUTH TWO TIMES A DAY AS NEEDED  Dispense: 60 tablet; Refill: 6    Continue Prednisone 5mg now using PRN approx 5/7 days out of the week  Continue w/  Enbrel 50mg sureclick. (start 4/2021)  Continue with Prednisone 5mg PRN  Continue with Meloxicam- only using "sometimes"   Rapid 3--> 5.44   Adding pe  No response with HCQ>   Intolerant MTX  SSZ tolerating but no significant difference when we held it for few weeks.  - off.     Continue Tizanidine    Follow up in about 4 months (around 9/29/2024).       40min consultation with greater than 50% of that time included Preparing to see the patient (review records, tests), Obtaining and/or reviewing " separately obtained historical data, Performing a medically appropriate examination and/or evaluation , Ordering medications, tests, and/or procedures, Referring and communicating with other healthcare professionals , Documenting clinical information in the electronic or other health record and Independently interpreting results  (as warranted) & communicating results to the patient/family/caregiver. All questions answered.

## 2024-05-29 NOTE — LETTER
May 29, 2024    Dorothy Jesus  [address] [facility] [address]             [facility]  [address] [place] [address]  Phone: [phone]  Fax: [phone] Patient is under my care for a chronic condition that she will be travelling with medication in a cooler that must remain on ice.   She may need accommodations boarding and moving about the airport this is considered medically necessary and appropriate.     Any assistance for her would be greatly appreciated.           Glory St DO, JAIME       q

## 2024-05-31 LAB
GAMMA INTERFERON BACKGROUND BLD IA-ACNC: 0.02 IU/ML
M TB IFN-G CD4+ BCKGRND COR BLD-ACNC: 0.02 IU/ML
M TB IFN-G CD4+ BCKGRND COR BLD-ACNC: 0.03 IU/ML
MITOGEN IGNF BCKGRD COR BLD-ACNC: 7.11 IU/ML
TB GOLD PLUS: NEGATIVE

## 2024-06-04 RX ORDER — LEVOMEFOLATE MAGNESIUM, LEUCOVORIN, FOLIC ACID, FERROUS CYSTEINE GLYCINATE, MAGNESIUM ASCORBATE, ZINC ASCORBATE, COCARBOXYLASE, FLAVIN ADENINE DINUCLEOTIDE, NADH, PYRIDOXAL PHOSPHATE ANHYDROUS, COBAMAMIDE, BETAINE, MAGNESIUM L-THREONATE, 1,2-DOCOSAHEXANOYL-SN-GLYCERO-3-PHOSPHOSERINE CALCIUM, 1,2-ICOSAPENTOYL-SN-GLYCERO-3-PHOSPHOSERINE CALCIUM, AND PHOSPHATIDYL SERINE 5.23; 2.5; 1; 13.6; 24; 1; 25; 25; 25; 25; 50; 500; 1; 6.4; 800; 12 MG/1; MG/1; MG/1; MG/1; MG/1; MG/1; UG/1; UG/1; UG/1; UG/1; UG/1; UG/1; MG/1; MG/1; UG/1; MG/1
1 CAPSULE, DELAYED RELEASE PELLETS ORAL
Qty: 90 CAPSULE | Refills: 1 | Status: SHIPPED | OUTPATIENT
Start: 2024-06-04

## 2024-06-04 NOTE — TELEPHONE ENCOUNTER
Refill Routing Note   Medication(s) are not appropriate for processing by [facility] for the following reason(s):        Outside of protocol    [facility] action(s):  Route             Appointments  past 12m or future 3m with PCP    Date Provider   Last Visit   5/16/2024 HARMEET Nguyen MD   Next Visit   6/27/2024 HARMEET Nguyen MD   ED visits in past 90 days: 0        Note composed:7:23 AM 06/04/2024

## 2024-06-23 ENCOUNTER — NURSE TRIAGE (OUTPATIENT)
Dept: ADMINISTRATIVE | Facility: CLINIC | Age: 44
End: 2024-06-23
Payer: MEDICAID

## 2024-06-23 NOTE — TELEPHONE ENCOUNTER
Pt  calls, pt flew back from [place] recently on Tuesday. Pt reports hearing loss, left worse than right, and ear congestion. Pt also reports left ear pain and drainage. Pt also reports increased dizziness and  caught her before she fell last night.     Dispo is to see physician with 4 hours. Discussed ED, UC, and OD VV as an option. Recommended ED as the best source of care s/t resources available. Patient agreed. Care advice given. Pt and spouse v/u.       Reason for Disposition   Earache persists > 1 hour   [1] Hearing loss in one or both ears AND [2] sudden onset AND [3] present now    Additional Information   Followed air travel or mountain driving   Negative: [1] Ringing in the ears (tinnitus) AND [2] taking aspirin AND [3] dosage sounds high (i.e., > 1500 mg/day)   Negative: Patient sounds very sick or weak to the triager    Protocols used: Hearing Loss or Change-A-AH, Ear - Congestion-A-AH

## 2024-06-24 NOTE — TELEPHONE ENCOUNTER
M for patient informing her that I was just Following up and reminding her of her appointment on 6/27/24 with Dr. Nguyen.

## 2024-06-27 ENCOUNTER — OFFICE VISIT (OUTPATIENT)
Dept: FAMILY MEDICINE | Facility: CLINIC | Age: 44
End: 2024-06-27
Payer: MEDICAID

## 2024-06-27 DIAGNOSIS — M79.7 FIBROMYALGIA: ICD-10-CM

## 2024-06-27 DIAGNOSIS — G43.909 MIGRAINE WITHOUT STATUS MIGRAINOSUS, NOT INTRACTABLE, UNSPECIFIED MIGRAINE TYPE: ICD-10-CM

## 2024-06-27 DIAGNOSIS — H69.90 DYSFUNCTION OF EUSTACHIAN TUBE, UNSPECIFIED LATERALITY: Primary | ICD-10-CM

## 2024-06-27 DIAGNOSIS — H92.09 OTALGIA, UNSPECIFIED LATERALITY: ICD-10-CM

## 2024-06-27 PROCEDURE — 99213 OFFICE O/P EST LOW 20 MIN: CPT | Mod: 95,,, | Performed by: FAMILY MEDICINE

## 2024-06-27 RX ORDER — FLUTICASONE PROPIONATE 50 MCG
1 SPRAY, SUSPENSION (ML) NASAL DAILY
Qty: 16 G | Refills: 2 | Status: SHIPPED | OUTPATIENT
Start: 2024-06-27

## 2024-06-27 RX ORDER — METHYLPREDNISOLONE 4 MG/1
TABLET ORAL
Qty: 21 EACH | Refills: 0 | Status: SHIPPED | OUTPATIENT
Start: 2024-06-27 | End: 2024-07-18

## 2024-06-27 NOTE — PROGRESS NOTES
Subjective:       Patient ID: Dorothy Jesus is a 44 y.o. female.    Chief Complaint: Otalgia    Here for follow up from ED visit. Was told couldn't get into ent without seeing pcp.      The patient location is: [place]  The chief complaint leading to consultation is: ear pain    Visit type: audiovisual    Face to Face time with patient: 10 minutes  12 minutes of total time spent on the encounter, which includes face to face time and non-face to face time preparing to see the patient (eg, review of tests), Obtaining and/or reviewing separately obtained history, Documenting clinical information in the electronic or other health record, Independently interpreting results (not separately reported) and communicating results to the patient/family/caregiver, or Care coordination (not separately reported).         Each patient to whom he or she provides medical services by telemedicine is:  (1) informed of the relationship between the physician and patient and the respective role of any other health care provider with respect to management of the patient; and (2) notified that he or she may decline to receive medical services by telemedicine and may withdraw from such care at any time.    Notes:     Review of Systems   Constitutional:  Negative for activity change, chills, fever and unexpected weight change.   HENT:  Positive for rhinorrhea. Negative for trouble swallowing.    Eyes:  Negative for discharge and visual disturbance.   Respiratory:  Negative for cough, chest tightness, shortness of breath and wheezing.    Cardiovascular:  Negative for chest pain, palpitations and leg swelling.   Gastrointestinal:  Negative for blood in stool, constipation, diarrhea and vomiting.   Endocrine: Negative for cold intolerance, heat intolerance, polydipsia and polyuria.   Genitourinary:  Negative for difficulty urinating, dysuria, hematuria and menstrual problem.   Musculoskeletal:  Negative for arthralgias, joint swelling and  neck pain.   Neurological:  Positive for headaches. Negative for weakness.   Psychiatric/Behavioral:  Negative for confusion, decreased concentration and dysphoric mood. The patient is not nervous/anxious.        Objective:      Physical Exam  Constitutional:       Appearance: Normal appearance.   Neurological:      Mental Status: She is alert.   Psychiatric:         Mood and Affect: Mood normal.         Behavior: Behavior normal.         Assessment:       1. Dysfunction of Eustachian tube, unspecified laterality    2. Migraine without status migrainosus, not intractable, unspecified migraine type    3. Otalgia, unspecified laterality    4. Fibromyalgia        Plan:       Dysfunction of Eustachian tube, unspecified laterality  -     methylPREDNISolone (MEDROL DOSEPACK) 4 mg tablet; use as directed  Dispense: 21 each; Refill: 0  -     fluticasone propionate (FLONASE) 50 mcg/actuation nasal spray; 1 spray (50 mcg total) by Each Nostril route once daily.  Dispense: 16 g; Refill: 2    Migraine without status migrainosus, not intractable, unspecified migraine type    Otalgia, unspecified laterality  -     Ambulatory referral/consult to ENT; Future; Expected date: 07/04/2024    Fibromyalgia    Mood improved  To ent if not improved; no rupture per ED notes  So treat and monitor  Will monitor chronic medical issues and continue current plan of care.      Follow up in about 3 months (around 9/27/2024), or if symptoms worsen or fail to improve.

## 2024-06-28 ENCOUNTER — PATIENT OUTREACH (OUTPATIENT)
Dept: ADMINISTRATIVE | Facility: HOSPITAL | Age: 44
End: 2024-06-28
Payer: MEDICAID

## 2024-06-28 ENCOUNTER — TELEPHONE (OUTPATIENT)
Dept: FAMILY MEDICINE | Facility: CLINIC | Age: 44
End: 2024-06-28
Payer: MEDICAID

## 2024-06-28 NOTE — TELEPHONE ENCOUNTER
Left message on recorder that I was calling to scheduled a 3 month follow up with her to see Dr. Dunlap.  She can call or schedule on line.

## 2024-06-28 NOTE — PROGRESS NOTES
Population Health Chart Review & Patient Outreach Details      Additional [facility] Health Notes:               Updates Requested / Reviewed:      Updated Care Coordination Note         Health Maintenance Topics Overdue:      VB Score: 2     Cervical Cancer Screening  Mammogram                       Health Maintenance Topic(s) Outreach Outcomes & Actions Taken:    Cervical Cancer Screening - Outreach Outcomes & Actions Taken  : OUTREACHED

## 2024-07-23 DIAGNOSIS — H69.90 DYSFUNCTION OF EUSTACHIAN TUBE, UNSPECIFIED LATERALITY: ICD-10-CM

## 2024-07-23 RX ORDER — FLUTICASONE PROPIONATE 50 MCG
SPRAY, SUSPENSION (ML) NASAL
Qty: 32 G | Refills: 2 | Status: SHIPPED | OUTPATIENT
Start: 2024-07-23

## 2024-07-23 NOTE — TELEPHONE ENCOUNTER
No care due was identified.  [facility] Embedded Care Due Messages. Reference number: [id number].   7/23/2024 2:15:28 AM CDT

## 2024-07-23 NOTE — TELEPHONE ENCOUNTER
Refill Routing Note   Medication(s) are not appropriate for processing by [facility] for the following reason(s):        New or recently adjusted medication    [facility] action(s):  Defer             Appointments  past 12m or future 3m with PCP    Date Provider   Last Visit   6/27/2024 HARMEET Nguyen MD   Next Visit   Visit date not found HARMEET Nguyen MD   ED visits in past 90 days: 1        Note composed:10:04 AM 07/23/2024

## 2024-08-11 ENCOUNTER — PATIENT MESSAGE (OUTPATIENT)
Dept: FAMILY MEDICINE | Facility: CLINIC | Age: 44
End: 2024-08-11
Payer: MEDICAID

## 2024-08-19 ENCOUNTER — PATIENT MESSAGE (OUTPATIENT)
Dept: ADMINISTRATIVE | Facility: OTHER | Age: 44
End: 2024-08-19
Payer: MEDICAID

## 2024-08-19 ENCOUNTER — PATIENT MESSAGE (OUTPATIENT)
Dept: ADMINISTRATIVE | Facility: HOSPITAL | Age: 44
End: 2024-08-19
Payer: MEDICAID

## 2024-08-23 ENCOUNTER — TELEPHONE (OUTPATIENT)
Dept: FAMILY MEDICINE | Facility: CLINIC | Age: 44
End: 2024-08-23
Payer: MEDICAID

## 2024-08-23 NOTE — TELEPHONE ENCOUNTER
----- Message from Areli Morgan sent at 8/23/2024  4:18 PM CDT -----  Contact: self  Type:  Sooner Appointment Request    Caller is requesting a sooner appointment.  Caller declined first available appointment listed below.  Caller will not accept being placed on the waitlist and is requesting a message be sent to doctor.    Name of Caller:  the patient  When is the first available appointment?  N/a  Symptoms:  low oxygen level  Would the patient rather a call back or a response via [facility]? call  Best Call Back Number:  [phone]  Additional Information:  pt is looking for an appt asap,  states 02 levels are low and bp is spiking to 150, (no appts avail) pt is also looking for a referral to cardiology

## 2024-08-26 ENCOUNTER — PATIENT MESSAGE (OUTPATIENT)
Dept: FAMILY MEDICINE | Facility: CLINIC | Age: 44
End: 2024-08-26
Payer: MEDICAID

## 2024-08-27 ENCOUNTER — TELEPHONE (OUTPATIENT)
Dept: FAMILY MEDICINE | Facility: CLINIC | Age: 44
End: 2024-08-27
Payer: MEDICAID

## 2024-08-27 NOTE — TELEPHONE ENCOUNTER
----- Message from Qing Max sent at 8/27/2024  3:33 PM CDT -----  Regarding: appointment  Contact: Micheal spouse  Type:  Sooner Appointment Request    Caller is requesting a sooner appointment.  Caller declined first available appointment listed below.  Caller will not accept being placed on the waitlist and is requesting a message be sent to doctor.    Name of Caller:  Christopher spouse  When is the first available appointment?    Symptoms:  fatigue low O2, blood pressure spikes and drops  Would the patient rather a call back or a response via [facility]? call  Best Call Back Number:  [phone]  Additional Information:  Please call spouse to advise.  Thanks!

## 2024-08-27 NOTE — TELEPHONE ENCOUNTER
Low O2 saturation and low blood pressure cannot be assessed or properly evaluated via virtual visit. If patient is feeling too lightheaded to make it to the clinic, she should be seen in the ER immediately.

## 2024-08-28 ENCOUNTER — OFFICE VISIT (OUTPATIENT)
Dept: FAMILY MEDICINE | Facility: CLINIC | Age: 44
End: 2024-08-28
Payer: MEDICAID

## 2024-08-28 ENCOUNTER — HOSPITAL ENCOUNTER (OUTPATIENT)
Dept: RADIOLOGY | Facility: HOSPITAL | Age: 44
Discharge: HOME | End: 2024-08-28
Attending: FAMILY MEDICINE
Payer: MEDICAID

## 2024-08-28 VITALS
WEIGHT: 162.81 LBS | BODY MASS INDEX: 29.96 KG/M2 | SYSTOLIC BLOOD PRESSURE: 110 MMHG | OXYGEN SATURATION: 96 % | DIASTOLIC BLOOD PRESSURE: 74 MMHG | HEART RATE: 100 BPM | HEIGHT: 62 IN

## 2024-08-28 DIAGNOSIS — G93.32 CHRONIC FATIGUE SYNDROME: ICD-10-CM

## 2024-08-28 DIAGNOSIS — G90.A POTS (POSTURAL ORTHOSTATIC TACHYCARDIA SYNDROME): Primary | ICD-10-CM

## 2024-08-28 DIAGNOSIS — G90.A POTS (POSTURAL ORTHOSTATIC TACHYCARDIA SYNDROME): ICD-10-CM

## 2024-08-28 LAB
OHS QRS DURATION: 70 MS
OHS QTC CALCULATION: 442 MS

## 2024-08-28 PROCEDURE — 3008F BODY MASS INDEX DOCD: CPT | Mod: CPTII,,, | Performed by: FAMILY MEDICINE

## 2024-08-28 PROCEDURE — 99999 PR PBB SHADOW E&M-EST. PATIENT-LVL III: CPT | Mod: PBBFAC,,, | Performed by: FAMILY MEDICINE

## 2024-08-28 PROCEDURE — 99214 OFFICE O/P EST MOD 30 MIN: CPT | Mod: S$PBB,,, | Performed by: FAMILY MEDICINE

## 2024-08-28 PROCEDURE — 3074F SYST BP LT 130 MM HG: CPT | Mod: CPTII,,, | Performed by: FAMILY MEDICINE

## 2024-08-28 PROCEDURE — 71046 X-RAY EXAM CHEST 2 VIEWS: CPT | Mod: 26,,, | Performed by: RADIOLOGY

## 2024-08-28 PROCEDURE — 3078F DIAST BP <80 MM HG: CPT | Mod: CPTII,,, | Performed by: FAMILY MEDICINE

## 2024-08-28 PROCEDURE — 93010 ELECTROCARDIOGRAM REPORT: CPT | Mod: S$PBB,,, | Performed by: INTERNAL MEDICINE

## 2024-08-28 PROCEDURE — 1159F MED LIST DOCD IN RCRD: CPT | Mod: CPTII,,, | Performed by: FAMILY MEDICINE

## 2024-08-28 PROCEDURE — G2211 COMPLEX E/M VISIT ADD ON: HCPCS | Mod: S$PBB,,, | Performed by: FAMILY MEDICINE

## 2024-08-28 PROCEDURE — 71046 X-RAY EXAM CHEST 2 VIEWS: CPT | Mod: TC,FY,PO

## 2024-08-28 PROCEDURE — 99213 OFFICE O/P EST LOW 20 MIN: CPT | Mod: PBBFAC,PO | Performed by: FAMILY MEDICINE

## 2024-08-28 PROCEDURE — 93005 ELECTROCARDIOGRAM TRACING: CPT | Mod: PBBFAC,PO | Performed by: INTERNAL MEDICINE

## 2024-08-28 NOTE — PROGRESS NOTES
Subjective:       Patient ID: Dorothy Jesus is a 44 y.o. female.    Chief Complaint: Fatigue (B\P problems)    Here for follow up multiple chronic medical issues. Doing well overall and in normal state of health.  Thinks has POTS and some elevated hr at times and lower O2 readings.      Fatigue  Associated symptoms include fatigue. Pertinent negatives include no chest pain, chills, coughing or fever.     Review of Systems   Constitutional:  Positive for fatigue. Negative for chills and fever.   Respiratory:  Negative for cough, chest tightness and shortness of breath.    Cardiovascular:  Negative for chest pain, palpitations and leg swelling.   Endocrine: Negative for cold intolerance and heat intolerance.   Psychiatric/Behavioral:  Negative for decreased concentration. The patient is not nervous/anxious.        Objective:      Physical Exam  Vitals and nursing note reviewed.   Constitutional:       Appearance: She is well-developed.   HENT:      Head: Normocephalic and atraumatic.   Cardiovascular:      Rate and Rhythm: Normal rate and regular rhythm.      Heart sounds: Normal heart sounds.   Pulmonary:      Effort: Pulmonary effort is normal.      Breath sounds: Normal breath sounds.   Psychiatric:         Mood and Affect: Mood normal.         Behavior: Behavior normal.         Assessment:       1. POTS (postural orthostatic tachycardia syndrome)    2. Chronic fatigue syndrome        Plan:       POTS (postural orthostatic tachycardia syndrome)  -     EKG 12-lead  -     X-Ray Chest PA And Lateral; Future; Expected date: 08/28/2024  -     Echo; Future    Chronic fatigue syndrome  -     EKG 12-lead  -     X-Ray Chest PA And Lateral; Future; Expected date: 08/28/2024  -     Echo; Future        Reduce metoprolol an option? But been stable for about 7 years   EKG today and reviewed as nsr  Echo soon  Reviewed recent labs  Will monitor chronic medical issues and continue current plan of care.  Visit today  included increased complexity associated with the care of the episodic problem fatigue addressed and managing the longitudinal care of the patient due to the serious and/or complex managed problem(s) as above.    Follow up in about 1 month (around 9/28/2024), or if symptoms worsen or fail to improve, for Virtual Visit.

## 2024-09-04 ENCOUNTER — HOSPITAL ENCOUNTER (OUTPATIENT)
Dept: CARDIOLOGY | Facility: HOSPITAL | Age: 44
Discharge: HOME | End: 2024-09-04
Attending: FAMILY MEDICINE
Payer: MEDICAID

## 2024-09-04 VITALS — WEIGHT: 162.94 LBS | BODY MASS INDEX: 29.98 KG/M2 | HEIGHT: 62 IN

## 2024-09-04 DIAGNOSIS — G90.A POTS (POSTURAL ORTHOSTATIC TACHYCARDIA SYNDROME): ICD-10-CM

## 2024-09-04 DIAGNOSIS — G93.32 CHRONIC FATIGUE SYNDROME: ICD-10-CM

## 2024-09-04 PROCEDURE — 93306 TTE W/DOPPLER COMPLETE: CPT | Mod: PO

## 2024-09-04 PROCEDURE — 93306 TTE W/DOPPLER COMPLETE: CPT | Mod: 26,,, | Performed by: INTERNAL MEDICINE

## 2024-09-05 LAB
ASCENDING AORTA: 2.34 CM
AV INDEX (PROSTH): 0.76
AV MEAN GRADIENT: 3 MMHG
AV PEAK GRADIENT: 6 MMHG
AV VALVE AREA BY VELOCITY RATIO: 2.46 CM²
AV VALVE AREA: 2.3 CM²
AV VELOCITY RATIO: 0.82
BSA FOR ECHO PROCEDURE: 1.8 M2
CV ECHO LV RWT: 0.52 CM
DOP CALC AO PEAK VEL: 1.25 M/S
DOP CALC AO VTI: 23.3 CM
DOP CALC LVOT AREA: 3 CM2
DOP CALC LVOT DIAMETER: 1.96 CM
DOP CALC LVOT PEAK VEL: 1.02 M/S
DOP CALC LVOT STROKE VOLUME: 53.68 CM3
DOP CALCLVOT PEAK VEL VTI: 17.8 CM
E WAVE DECELERATION TIME: 187.57 MSEC
E/A RATIO: 0.78
E/E' RATIO: 5.57 M/S
ECHO LV POSTERIOR WALL: 0.83 CM (ref 0.6–1.1)
FRACTIONAL SHORTENING: 32 % (ref 28–44)
INTERVENTRICULAR SEPTUM: 0.78 CM (ref 0.6–1.1)
IVRT: 91.34 MSEC
LEFT ATRIUM AREA SYSTOLIC (APICAL 2 CHAMBER): 12.71 CM2
LEFT ATRIUM AREA SYSTOLIC (APICAL 4 CHAMBER): 11.96 CM2
LEFT ATRIUM SIZE: 2.99 CM
LEFT ATRIUM VOLUME INDEX MOD: 17.7 ML/M2
LEFT ATRIUM VOLUME MOD: 30.98 CM3
LEFT INTERNAL DIMENSION IN SYSTOLE: 2.17 CM (ref 2.1–4)
LEFT VENTRICLE DIASTOLIC VOLUME INDEX: 23.27 ML/M2
LEFT VENTRICLE DIASTOLIC VOLUME: 40.73 ML
LEFT VENTRICLE END SYSTOLIC VOLUME APICAL 2 CHAMBER: 36.32 ML
LEFT VENTRICLE END SYSTOLIC VOLUME APICAL 4 CHAMBER: 23.88 ML
LEFT VENTRICLE MASS INDEX: 37 G/M2
LEFT VENTRICLE SYSTOLIC VOLUME INDEX: 8.9 ML/M2
LEFT VENTRICLE SYSTOLIC VOLUME: 15.58 ML
LEFT VENTRICULAR INTERNAL DIMENSION IN DIASTOLE: 3.19 CM (ref 3.5–6)
LEFT VENTRICULAR MASS: 65.6 G
LV LATERAL E/E' RATIO: 5.33 M/S
LV SEPTAL E/E' RATIO: 5.82 M/S
LVED V (TEICH): 40.73 ML
LVES V (TEICH): 15.58 ML
LVOT MG: 2.04 MMHG
LVOT MV: 0.67 CM/S
MV PEAK A VEL: 0.82 M/S
MV PEAK E VEL: 0.64 M/S
MV STENOSIS PRESSURE HALF TIME: 54.39 MS
MV VALVE AREA P 1/2 METHOD: 4.04 CM2
OHS CV RV/LV RATIO: 0.82 CM
PISA TR MAX VEL: 2.23 M/S
PULM VEIN S/D RATIO: 1.87
PV PEAK D VEL: 0.38 M/S
PV PEAK S VEL: 0.71 M/S
RA PRESSURE ESTIMATED: 3 MMHG
RA VOL SYS: 32.32 ML
RIGHT ATRIAL AREA: 11.7 CM2
RIGHT ATRIUM VOLUME AREA LENGTH APICAL 4 CHAMBER: 30.75 ML
RIGHT VENTRICLE DIASTOLIC LENGTH: 5.5 CM
RIGHT VENTRICLE DIASTOLIC MID DIMENSION: 1.7 CM
RIGHT VENTRICULAR END-DIASTOLIC DIMENSION: 2.6 CM
RIGHT VENTRICULAR LENGTH IN DIASTOLE (APICAL 4-CHAMBER VIEW): 5.48 CM
RV MID DIAMA: 1.74 CM
RV TB RVSP: 5 MMHG
RV TISSUE DOPPLER FREE WALL SYSTOLIC VELOCITY 1 (APICAL 4 CHAMBER VIEW): 12.29 CM/S
SINUS: 2.89 CM
STJ: 2.26 CM
TDI LATERAL: 0.12 M/S
TDI SEPTAL: 0.11 M/S
TDI: 0.12 M/S
TR MAX PG: 20 MMHG
TRICUSPID ANNULAR PLANE SYSTOLIC EXCURSION: 1.61 CM
TV REST PULMONARY ARTERY PRESSURE: 23 MMHG
Z-SCORE OF LEFT VENTRICULAR DIMENSION IN END DIASTOLE: -4.17
Z-SCORE OF LEFT VENTRICULAR DIMENSION IN END SYSTOLE: -2.55

## 2024-09-13 ENCOUNTER — PATIENT MESSAGE (OUTPATIENT)
Dept: ADMINISTRATIVE | Facility: HOSPITAL | Age: 44
End: 2024-09-13
Payer: MEDICAID

## 2024-09-26 ENCOUNTER — TELEPHONE (OUTPATIENT)
Dept: FAMILY MEDICINE | Facility: CLINIC | Age: 44
End: 2024-09-26
Payer: MEDICAID

## 2024-09-26 NOTE — TELEPHONE ENCOUNTER
----- Message from Tamar Coats sent at 9/26/2024  3:13 PM CDT -----  Contact: pt  jyoti  Type: Needs Medical Advice  Who Called:  pt michelle montes  Best Call Back Number: [phone]   Additional Information: would like to know if her 3.20pm appt can be done virtual.please call   denies

## 2024-09-27 ENCOUNTER — LAB VISIT (OUTPATIENT)
Dept: LAB | Facility: HOSPITAL | Age: 44
End: 2024-09-27
Attending: INTERNAL MEDICINE
Payer: MEDICAID

## 2024-09-27 DIAGNOSIS — D84.821 IMMUNOSUPPRESSION DUE TO DRUG THERAPY: ICD-10-CM

## 2024-09-27 DIAGNOSIS — M06.00 SERONEGATIVE RHEUMATOID ARTHRITIS: ICD-10-CM

## 2024-09-27 DIAGNOSIS — Z79.899 HIGH RISK MEDICATIONS (NOT ANTICOAGULANTS) LONG-TERM USE: ICD-10-CM

## 2024-09-27 DIAGNOSIS — Z79.899 IMMUNOSUPPRESSION DUE TO DRUG THERAPY: ICD-10-CM

## 2024-09-27 LAB
ALT SERPL W/O P-5'-P-CCNC: 14 U/L (ref 10–44)
AST SERPL-CCNC: 18 U/L (ref 10–40)
BASOPHILS # BLD AUTO: 0.08 K/UL (ref 0–0.2)
BASOPHILS NFR BLD: 0.7 % (ref 0–1.9)
CREAT SERPL-MCNC: 1 MG/DL (ref 0.5–1.4)
CRP SERPL-MCNC: 4.9 MG/L (ref 0–8.2)
DIFFERENTIAL METHOD BLD: NORMAL
EOSINOPHIL # BLD AUTO: 0.1 K/UL (ref 0–0.5)
EOSINOPHIL NFR BLD: 1.2 % (ref 0–8)
ERYTHROCYTE [DISTWIDTH] IN BLOOD BY AUTOMATED COUNT: 12.5 % (ref 11.5–14.5)
ERYTHROCYTE [SEDIMENTATION RATE] IN BLOOD BY PHOTOMETRIC METHOD: 5 MM/HR (ref 0–36)
EST. GFR  (NO RACE VARIABLE): >60 ML/MIN/1.73 M^2
HCT VFR BLD AUTO: 40.9 % (ref 37–48.5)
HGB BLD-MCNC: 13.6 G/DL (ref 12–16)
IMM GRANULOCYTES # BLD AUTO: 0.03 K/UL (ref 0–0.04)
IMM GRANULOCYTES NFR BLD AUTO: 0.3 % (ref 0–0.5)
LYMPHOCYTES # BLD AUTO: 3.6 K/UL (ref 1–4.8)
LYMPHOCYTES NFR BLD: 31.4 % (ref 18–48)
MCH RBC QN AUTO: 29.1 PG (ref 27–31)
MCHC RBC AUTO-ENTMCNC: 33.3 G/DL (ref 32–36)
MCV RBC AUTO: 88 FL (ref 82–98)
MONOCYTES # BLD AUTO: 0.7 K/UL (ref 0.3–1)
MONOCYTES NFR BLD: 6.2 % (ref 4–15)
NEUTROPHILS # BLD AUTO: 6.8 K/UL (ref 1.8–7.7)
NEUTROPHILS NFR BLD: 60.2 % (ref 38–73)
NRBC BLD-RTO: 0 /100 WBC
PLATELET # BLD AUTO: 285 K/UL (ref 150–450)
PMV BLD AUTO: 12.9 FL (ref 9.2–12.9)
RBC # BLD AUTO: 4.67 M/UL (ref 4–5.4)
WBC # BLD AUTO: 11.3 K/UL (ref 3.9–12.7)

## 2024-09-27 PROCEDURE — 84450 TRANSFERASE (AST) (SGOT): CPT | Performed by: INTERNAL MEDICINE

## 2024-09-27 PROCEDURE — 85025 COMPLETE CBC W/AUTO DIFF WBC: CPT | Performed by: INTERNAL MEDICINE

## 2024-09-27 PROCEDURE — 84460 ALANINE AMINO (ALT) (SGPT): CPT | Performed by: INTERNAL MEDICINE

## 2024-09-27 PROCEDURE — 85652 RBC SED RATE AUTOMATED: CPT | Performed by: INTERNAL MEDICINE

## 2024-09-27 PROCEDURE — 86140 C-REACTIVE PROTEIN: CPT | Performed by: INTERNAL MEDICINE

## 2024-09-27 PROCEDURE — 85651 RBC SED RATE NONAUTOMATED: CPT | Mod: PO | Performed by: INTERNAL MEDICINE

## 2024-09-27 PROCEDURE — 36415 COLL VENOUS BLD VENIPUNCTURE: CPT | Mod: PO | Performed by: INTERNAL MEDICINE

## 2024-09-27 PROCEDURE — 82565 ASSAY OF CREATININE: CPT | Performed by: INTERNAL MEDICINE

## 2024-10-01 ENCOUNTER — OFFICE VISIT (OUTPATIENT)
Dept: RHEUMATOLOGY | Facility: CLINIC | Age: 44
End: 2024-10-01
Payer: MEDICAID

## 2024-10-01 VITALS
HEART RATE: 87 BPM | SYSTOLIC BLOOD PRESSURE: 123 MMHG | BODY MASS INDEX: 30 KG/M2 | WEIGHT: 163 LBS | DIASTOLIC BLOOD PRESSURE: 73 MMHG | HEIGHT: 62 IN

## 2024-10-01 DIAGNOSIS — D84.821 IMMUNOSUPPRESSION DUE TO DRUG THERAPY: ICD-10-CM

## 2024-10-01 DIAGNOSIS — Z79.899 HIGH RISK MEDICATIONS (NOT ANTICOAGULANTS) LONG-TERM USE: ICD-10-CM

## 2024-10-01 DIAGNOSIS — G93.32 CHRONIC FATIGUE SYNDROME: ICD-10-CM

## 2024-10-01 DIAGNOSIS — Z79.899 IMMUNOSUPPRESSION DUE TO DRUG THERAPY: ICD-10-CM

## 2024-10-01 DIAGNOSIS — M79.7 FIBROMYALGIA: ICD-10-CM

## 2024-10-01 DIAGNOSIS — M06.00 SERONEGATIVE RHEUMATOID ARTHRITIS: Primary | ICD-10-CM

## 2024-10-01 PROCEDURE — 99999 PR PBB SHADOW E&M-EST. PATIENT-LVL II: CPT | Mod: PBBFAC,,, | Performed by: INTERNAL MEDICINE

## 2024-10-01 PROCEDURE — 3074F SYST BP LT 130 MM HG: CPT | Mod: CPTII,,, | Performed by: INTERNAL MEDICINE

## 2024-10-01 PROCEDURE — 99215 OFFICE O/P EST HI 40 MIN: CPT | Mod: S$PBB,,, | Performed by: INTERNAL MEDICINE

## 2024-10-01 PROCEDURE — 3078F DIAST BP <80 MM HG: CPT | Mod: CPTII,,, | Performed by: INTERNAL MEDICINE

## 2024-10-01 PROCEDURE — 99212 OFFICE O/P EST SF 10 MIN: CPT | Mod: PBBFAC,PO | Performed by: INTERNAL MEDICINE

## 2024-10-01 PROCEDURE — 1159F MED LIST DOCD IN RCRD: CPT | Mod: CPTII,,, | Performed by: INTERNAL MEDICINE

## 2024-10-01 PROCEDURE — 3008F BODY MASS INDEX DOCD: CPT | Mod: CPTII,,, | Performed by: INTERNAL MEDICINE

## 2024-10-01 RX ORDER — PREDNISONE 5 MG/1
5 TABLET ORAL DAILY
COMMUNITY
Start: 2024-09-03

## 2024-10-01 RX ORDER — TRAZODONE HYDROCHLORIDE 50 MG/1
50 TABLET ORAL NIGHTLY PRN
COMMUNITY
Start: 2024-09-03

## 2024-10-01 ASSESSMENT — ROUTINE ASSESSMENT OF PATIENT INDEX DATA (RAPID3)
PAIN SCORE: 4
PATIENT GLOBAL ASSESSMENT SCORE: 6.5
TOTAL RAPID3 SCORE: 4.5
MDHAQ FUNCTION SCORE: 0.9

## 2024-10-01 NOTE — PROGRESS NOTES
Subjective:          Chief Complaint: Dorothy Jesus is a 44 y.o. female who had concerns including Disease Management.    HPI:    Seronegative Rheumatoid/ Undifferentiated connective tissue disease comorbid chronic fatigue.   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 No cardiac manifestations.     Positive LENA , elevation in her CRP normal sedimentation rate but did have benefit on prednisone.    trial of prednisone did show significant improvement in her overall symptomst   When she comes off prednisone everything seems to regress at her baseline. No rashes, some pain with respirations, no fevers but subjectively, fatigue in sun no rash.     Patient with hand and ankle pain as of last few weeks but also more widespread MSK complaints and fatigue as well. Using Prednisone with modest improvement.   Patient with increased and persistent fatigue since her last COVID infection- she is having increased fatigue sleeping well, no known LEIGH.   ESR/ CRP- WNL  slight rishe last week.   She has a slight elevation of WBC- no c/o illness/UTI sx.     9/2024: patient stopped prednisone 5mg daily which she was using for years most days, she attributed feeling better with feeling less fatigued but never noted as much pain relief. Since stopping no worse with regard to her joints, no greater benefit with her fatigue.   She has adjusted hydroxyzine timing which is giving her significantly better days with less fatigue.   Patient suspect to have POTS and she already has hx of BJHS so this is fitting. She is having more tachycardic episodes with exertion. She has a long hx with tachycardia which was the reason to start metoprolol.     Rapid  3   5/2022: 5.44  11/1/22: 3.89  3/2023: 5.44  9/2023: 3.55  1/2024: 4.0  5/2024: 5.44  9/2024: 4.5 Off pred x 3 months and actually better.        TB/Hepatitis updated 2/2021 negative.     Enbrel (4/2021)  Prednisone still 5mg down to 6/7 days weekly as of recent, she was down to 3  "times weeks previously.   Tizanidine 4mg at HS :  helps with back and upper trapezius.         She failed HCQ with no response  MTX made her ill/nausea.  SSZ 2gm no benefit.   Tylenol as not helpful  Ibuprofen PRN more for menstrual cramps never found helpful for joints.   Lost efficacy with Humira DC 3/2021    FMS:     She has increased her activity level trying to eat healthier.  In the past she states she was getting some relief of myalgias and arthralgias on ketoprofen.  He completed the vitamin D 12 weeks her psychiatrist has recently adjusted her medications slightly and this seems to be helping a great deal.  Interestingly she noted with the adjustment in her ADHD meds her myalgias have improved.Patient is a 34 y/o female referred for +LENA  1:80 homogeneous with repeat serologies + 1:320 negative LENA profile.  with myalgias, brain fog. Present for few years worse in past 2 years, affecting daily living/function. Patient did suffer from worsening of her symptoms after trying gabapentin, she did increase dose as we discussed but this only exacerbated her complaints. She did have worseing depression through the winter. Recent job did not work out for her, which was frustrating.  did recently get full time job.    Exacerbates with menstrual cycles. Patient has a non-restorative sleep pattern, no snoring. She has had sleep study-[place] negative for narcoplepsy. Patient was given Provigil exacerbated anxiety.    Recently with cognitive impairment, ++migraines (unable to see Dr. Yao at this time, cannot drive to [place]), extreme fatigue, dizzyness. She is more forgetful that typical.   Trying cataflam PRN migraine.      Patient was on lamictal for nearly 8 years d/c'd for suspected myalgias. Recently (few weeks ago) trialed lithium did not tolerate. Zyprexa, Geodon, topamax and risperdol. Wellbutrin has been the best medication for her as she continues to feel "human". She is having more myalgias, and some " arthralgias some good days some bad days. She cannot see a pattern of morning stiffness.     Sister with UC  Mom recent dx RA.   Component      Latest Ref Rng & Units 4/13/2017 12/10/2015   Anti Sm Antibody      0.00 - 19.99 EU 0.35 0.43   Anti-Sm Interpretation      Negative Negative Negative   Anti-SSA Antibody      0.00 - 19.99 EU 0.97 1.62   Anti-SSA Interpretation      Negative Negative Negative   Anti-SSB Antibody      0.00 - 19.99 EU 0.35 0.00   Anti-SSB Interpretation      Negative Negative Negative   ds DNA Ab      Negative 1:10 Negative 1:10 Negative 1:10   Anti Sm/RNP Antibody      0.00 - 19.99 EU 0.79 0.86   Anti-Sm/RNP Interpretation      Negative Negative Negative   Complement (C-3)      50 - 180 mg/dL  108   Complement (C-4)      11 - 44 mg/dL  27   CPK      20 - 180 U/L  50   LENA Screen      Negative <1:160 Positive (A)    LENA HEP-2 Titer       Positive 1:320 Homogeneous        REVIEW OF SYSTEMS:    Review of Systems   Constitutional:  Positive for malaise/fatigue. Negative for fever and weight loss.   HENT:  Negative for sore throat.    Eyes:  Negative for double vision, photophobia and redness.   Respiratory:  Negative for cough, shortness of breath and wheezing.    Cardiovascular:  Negative for chest pain, palpitations and orthopnea.   Gastrointestinal:  Negative for abdominal pain, constipation and diarrhea.   Genitourinary:  Negative for dysuria, hematuria and urgency.   Musculoskeletal:  Positive for joint pain and neck pain. Negative for back pain and myalgias.   Skin:  Negative for rash.   Neurological:  Negative for dizziness, tingling, focal weakness and headaches.   Endo/Heme/Allergies:  Does not bruise/bleed easily.   Psychiatric/Behavioral:  Negative for depression, hallucinations and suicidal ideas.                Objective:            Past Medical History:   Diagnosis Date    Anxiety     Bartholin's cyst     Bipolar 2 disorder     Chronic fatigue     Osteopenia      Family History    Problem Relation Name Age of Onset    Breast cancer Maternal Grandmother       Social History     Tobacco Use    Smoking status: Never    Smokeless tobacco: Never   Substance Use Topics    Alcohol use: No     Comment: rare    Drug use: No         Current Outpatient Medications on File Prior to Visit   Medication Sig Dispense Refill    buPROPion (WELLBUTRIN XL) 300 MG 24 hr tablet Take 300 mg by mouth once daily.       clonazePAM (KLONOPIN) 0.5 MG tablet       dextroamphetamine-amphetamine (ADDERALL XR) 20 MG 24 hr capsule Take 20 mg by mouth every morning.       ENBRACE HR 1.5 mg iron- 8.73 mg-6.4 mg capsule TAKE ONE CAPSULE BY MOUTH ONCE DAILY 90 capsule 1    etanercept (ENBREL SURECLICK) 50 mg/mL (1 mL) Inject 1 mL (50 mg total) into the skin once a week. 4 mL 11    fluticasone propionate (FLONASE) 50 mcg/actuation nasal spray SPRAY ONE SPRAY BY EACH NOSTRIL ROUTE ONCE DAILY 32 g 2    hydrOXYzine pamoate (VISTARIL) 50 MG Cap Take 50 mg by mouth daily as needed.      meloxicam (MOBIC) 15 MG tablet Take 1 tablet (15 mg total) by mouth once daily. 30 tablet 6    metoprolol succinate (TOPROL-XL) 25 MG 24 hr tablet TAKE 1 TABLET (25MG) BY MOUTH ONCE DAILY 90 tablet 3    omeprazole (PRILOSEC) 20 MG capsule Take 1 capsule (20 mg total) by mouth once daily. 30 capsule 11    sertraline (ZOLOFT) 100 MG tablet Take 100 mg by mouth once daily.      tiZANidine (ZANAFLEX) 4 MG tablet TAKE ONE TABLET (4MG) BY MOUTH TWO TIMES A DAY AS NEEDED 60 tablet 6    traZODone (DESYREL) 50 MG tablet Take 50 mg by mouth nightly as needed.      EPINEPHrine (EPIPEN) 0.3 mg/0.3 mL AtIn Inject 0.6 mLs (0.6 mg total) into the muscle once. for 1 dose 2 each 1    predniSONE (DELTASONE) 5 MG tablet Take 5 mg by mouth once daily. (Patient not taking: Reported on 10/1/2024)       No current facility-administered medications on file prior to visit.       Vitals:    10/01/24 1556   BP: 123/73   Pulse: 87       Physical Exam:    Physical  Exam  Constitutional:       Appearance: She is well-developed.   HENT:      Head: Normocephalic and atraumatic.   Eyes:      General: Lids are normal.      Pupils: Pupils are equal, round, and reactive to light.   Cardiovascular:      Rate and Rhythm: Normal rate and regular rhythm.      Heart sounds: Normal heart sounds.   Pulmonary:      Effort: Pulmonary effort is normal.      Breath sounds: Normal breath sounds.   Musculoskeletal:      Right shoulder: Tenderness present. No swelling. Normal range of motion.      Left shoulder: Tenderness present. No swelling. Normal range of motion.      Right elbow: No swelling. Normal range of motion. No tenderness.      Left elbow: No swelling. Normal range of motion. No tenderness.      Right wrist: Tenderness present. No swelling. Normal range of motion.      Left wrist: Tenderness present. No swelling. Normal range of motion.      Right hand: Tenderness present. No swelling. Normal range of motion.      Left hand: Tenderness present. No swelling. Normal range of motion.      Cervical back: Normal range of motion.      Right knee: No swelling. Normal range of motion. No tenderness.      Left knee: No swelling. Normal range of motion. No tenderness.      Right foot: Normal range of motion. No swelling or tenderness.      Left foot: Normal range of motion. No swelling or tenderness.      Comments: 18 tender points examined in nine pairs: Posterior occiput, bilateral trapezius, bilateral supraspinatus, bilateral gluteal muscles, bilateral low cervical neck, bilateral second rib, bilateral lateral epicondyles, bilateral greater trochanters, bilateral medial knees. 16 Of 18 points examined were positive for tenderness.      MCP and PIP tenderness with full but painful finger curl.      Skin:     General: Skin is warm and dry.   Neurological:      Mental Status: She is alert and oriented to person, place, and time.   Psychiatric:         Behavior: Behavior normal.         Thought  "Content: Thought content normal.         Assessment:       Encounter Diagnoses   Name Primary?    Seronegative rheumatoid arthritis Yes    Fibromyalgia     Immunosuppression due to drug therapy     High risk medications (not anticoagulants) long-term use     Chronic fatigue syndrome            Plan:        Seronegative rheumatoid arthritis  -     ALT (SGPT); Future; Expected date: 10/01/2024  -     AST (SGOT); Future; Expected date: 10/01/2024  -     CBC Auto Differential; Future; Expected date: 10/01/2024  -     C-Reactive Protein; Future; Expected date: 10/01/2024  -     Creatinine, Serum; Future; Expected date: 10/01/2024  -     Sedimentation rate; Future; Expected date: 10/01/2024    Fibromyalgia    Immunosuppression due to drug therapy  -     ALT (SGPT); Future; Expected date: 10/01/2024  -     AST (SGOT); Future; Expected date: 10/01/2024  -     CBC Auto Differential; Future; Expected date: 10/01/2024  -     C-Reactive Protein; Future; Expected date: 10/01/2024  -     Creatinine, Serum; Future; Expected date: 10/01/2024  -     Sedimentation rate; Future; Expected date: 10/01/2024    High risk medications (not anticoagulants) long-term use  -     ALT (SGPT); Future; Expected date: 10/01/2024  -     AST (SGOT); Future; Expected date: 10/01/2024  -     CBC Auto Differential; Future; Expected date: 10/01/2024  -     C-Reactive Protein; Future; Expected date: 10/01/2024  -     Creatinine, Serum; Future; Expected date: 10/01/2024  -     Sedimentation rate; Future; Expected date: 10/01/2024    Chronic fatigue syndrome      Continue w/  Enbrel 50mg sureclick. (start 4/2021)  Stay off Prednisone this is great.   Continue with Meloxicam- only using "sometimes" encourage to use more  Rapid 3--> 4.5       No response with HCQ>   Intolerant MTX  SSZ toleratws/ no significant improvement   - off.     Continue Tizanidine    Follow up in about 4 months (around 2/1/2025).       40min consultation with greater than 50% of that " time included Preparing to see the patient (review records, tests), Obtaining and/or reviewing separately obtained historical data, Performing a medically appropriate examination and/or evaluation , Ordering medications, tests, and/or procedures, Referring and communicating with other healthcare professionals , Documenting clinical information in the electronic or other health record and Independently interpreting results  (as warranted) & communicating results to the patient/family/caregiver. All questions answered.

## 2024-12-18 DIAGNOSIS — Z12.31 OTHER SCREENING MAMMOGRAM: ICD-10-CM

## 2025-01-09 DIAGNOSIS — R00.0 TACHYCARDIA: ICD-10-CM

## 2025-01-09 RX ORDER — METOPROLOL SUCCINATE 25 MG/1
25 TABLET, EXTENDED RELEASE ORAL DAILY
Qty: 90 TABLET | Refills: 2 | Status: SHIPPED | OUTPATIENT
Start: 2025-01-09

## 2025-01-10 NOTE — TELEPHONE ENCOUNTER
No care due was identified.  [facility] Embedded Care Due Messages. Reference number: [id number].   1/09/2025 10:21:09 PM CST

## 2025-01-10 NOTE — TELEPHONE ENCOUNTER
Refill Decision Note   Dorothy Jesus  is requesting a refill authorization.  Brief Assessment and Rationale for Refill:  Approve     Medication Therapy Plan:         Comments:     Note composed:10:23 PM 01/09/2025

## 2025-01-27 ENCOUNTER — LAB VISIT (OUTPATIENT)
Dept: LAB | Facility: HOSPITAL | Age: 45
End: 2025-01-27
Attending: INTERNAL MEDICINE
Payer: MEDICAID

## 2025-01-27 DIAGNOSIS — M06.00 SERONEGATIVE RHEUMATOID ARTHRITIS: ICD-10-CM

## 2025-01-27 DIAGNOSIS — Z79.899 HIGH RISK MEDICATIONS (NOT ANTICOAGULANTS) LONG-TERM USE: ICD-10-CM

## 2025-01-27 DIAGNOSIS — D84.821 IMMUNOSUPPRESSION DUE TO DRUG THERAPY: ICD-10-CM

## 2025-01-27 DIAGNOSIS — Z79.899 IMMUNOSUPPRESSION DUE TO DRUG THERAPY: ICD-10-CM

## 2025-01-27 LAB
ALT SERPL W/O P-5'-P-CCNC: 17 U/L (ref 10–44)
AST SERPL-CCNC: 15 U/L (ref 10–40)
CREAT SERPL-MCNC: 1.1 MG/DL (ref 0.5–1.4)
CRP SERPL-MCNC: 2.3 MG/L (ref 0–8.2)
ERYTHROCYTE [SEDIMENTATION RATE] IN BLOOD BY PHOTOMETRIC METHOD: <2 MM/HR (ref 0–36)
EST. GFR  (NO RACE VARIABLE): >60 ML/MIN/1.73 M^2

## 2025-01-27 PROCEDURE — 84450 TRANSFERASE (AST) (SGOT): CPT | Performed by: INTERNAL MEDICINE

## 2025-01-27 PROCEDURE — 85025 COMPLETE CBC W/AUTO DIFF WBC: CPT | Performed by: INTERNAL MEDICINE

## 2025-01-27 PROCEDURE — 85651 RBC SED RATE NONAUTOMATED: CPT | Mod: PO | Performed by: INTERNAL MEDICINE

## 2025-01-27 PROCEDURE — 36415 COLL VENOUS BLD VENIPUNCTURE: CPT | Mod: PO | Performed by: INTERNAL MEDICINE

## 2025-01-27 PROCEDURE — 84460 ALANINE AMINO (ALT) (SGPT): CPT | Performed by: INTERNAL MEDICINE

## 2025-01-27 PROCEDURE — 82565 ASSAY OF CREATININE: CPT | Performed by: INTERNAL MEDICINE

## 2025-01-27 PROCEDURE — 86140 C-REACTIVE PROTEIN: CPT | Performed by: INTERNAL MEDICINE

## 2025-01-28 LAB
BASOPHILS # BLD AUTO: 0.06 K/UL (ref 0–0.2)
BASOPHILS NFR BLD: 0.7 % (ref 0–1.9)
DIFFERENTIAL METHOD BLD: ABNORMAL
EOSINOPHIL # BLD AUTO: 0.1 K/UL (ref 0–0.5)
EOSINOPHIL NFR BLD: 1.4 % (ref 0–8)
ERYTHROCYTE [DISTWIDTH] IN BLOOD BY AUTOMATED COUNT: 12.9 % (ref 11.5–14.5)
HCT VFR BLD AUTO: 42 % (ref 37–48.5)
HGB BLD-MCNC: 13.8 G/DL (ref 12–16)
IMM GRANULOCYTES # BLD AUTO: 0.01 K/UL (ref 0–0.04)
IMM GRANULOCYTES NFR BLD AUTO: 0.1 % (ref 0–0.5)
LYMPHOCYTES # BLD AUTO: 2.7 K/UL (ref 1–4.8)
LYMPHOCYTES NFR BLD: 31 % (ref 18–48)
MCH RBC QN AUTO: 29.2 PG (ref 27–31)
MCHC RBC AUTO-ENTMCNC: 32.9 G/DL (ref 32–36)
MCV RBC AUTO: 89 FL (ref 82–98)
MONOCYTES # BLD AUTO: 0.6 K/UL (ref 0.3–1)
MONOCYTES NFR BLD: 6.8 % (ref 4–15)
NEUTROPHILS # BLD AUTO: 5.2 K/UL (ref 1.8–7.7)
NEUTROPHILS NFR BLD: 60 % (ref 38–73)
NRBC BLD-RTO: 0 /100 WBC
PLATELET # BLD AUTO: 262 K/UL (ref 150–450)
PMV BLD AUTO: 13.2 FL (ref 9.2–12.9)
RBC # BLD AUTO: 4.72 M/UL (ref 4–5.4)
WBC # BLD AUTO: 8.62 K/UL (ref 3.9–12.7)

## 2025-02-04 ENCOUNTER — OFFICE VISIT (OUTPATIENT)
Dept: RHEUMATOLOGY | Facility: CLINIC | Age: 45
End: 2025-02-04
Payer: MEDICAID

## 2025-02-04 VITALS
WEIGHT: 157.44 LBS | DIASTOLIC BLOOD PRESSURE: 86 MMHG | SYSTOLIC BLOOD PRESSURE: 126 MMHG | HEART RATE: 102 BPM | BODY MASS INDEX: 28.97 KG/M2 | HEIGHT: 62 IN

## 2025-02-04 DIAGNOSIS — D84.821 IMMUNOSUPPRESSION DUE TO DRUG THERAPY: ICD-10-CM

## 2025-02-04 DIAGNOSIS — M06.00 SERONEGATIVE RHEUMATOID ARTHRITIS: Primary | ICD-10-CM

## 2025-02-04 DIAGNOSIS — G93.32 MYALGIC ENCEPHALOMYELITIS/CHRONIC FATIGUE SYNDROME (ME/CFS): ICD-10-CM

## 2025-02-04 DIAGNOSIS — M79.7 FIBROMYALGIA: ICD-10-CM

## 2025-02-04 DIAGNOSIS — Z79.899 HIGH RISK MEDICATIONS (NOT ANTICOAGULANTS) LONG-TERM USE: ICD-10-CM

## 2025-02-04 DIAGNOSIS — Z79.899 IMMUNOSUPPRESSION DUE TO DRUG THERAPY: ICD-10-CM

## 2025-02-04 PROCEDURE — 99999 PR PBB SHADOW E&M-EST. PATIENT-LVL III: CPT | Mod: PBBFAC,,, | Performed by: INTERNAL MEDICINE

## 2025-02-04 PROCEDURE — 99213 OFFICE O/P EST LOW 20 MIN: CPT | Mod: PBBFAC,PO | Performed by: INTERNAL MEDICINE

## 2025-02-04 PROCEDURE — 3074F SYST BP LT 130 MM HG: CPT | Mod: CPTII,,, | Performed by: INTERNAL MEDICINE

## 2025-02-04 PROCEDURE — 3079F DIAST BP 80-89 MM HG: CPT | Mod: CPTII,,, | Performed by: INTERNAL MEDICINE

## 2025-02-04 PROCEDURE — 3008F BODY MASS INDEX DOCD: CPT | Mod: CPTII,,, | Performed by: INTERNAL MEDICINE

## 2025-02-04 PROCEDURE — 1159F MED LIST DOCD IN RCRD: CPT | Mod: CPTII,,, | Performed by: INTERNAL MEDICINE

## 2025-02-04 PROCEDURE — 99215 OFFICE O/P EST HI 40 MIN: CPT | Mod: S$PBB,,, | Performed by: INTERNAL MEDICINE

## 2025-02-04 ASSESSMENT — ROUTINE ASSESSMENT OF PATIENT INDEX DATA (RAPID3)
FATIGUE SCORE: 1.1
PAIN SCORE: 5
PATIENT GLOBAL ASSESSMENT SCORE: 6
MDHAQ FUNCTION SCORE: 0.9
PSYCHOLOGICAL DISTRESS SCORE: 1.1
TOTAL RAPID3 SCORE: 4.67

## 2025-02-04 NOTE — PROGRESS NOTES
Subjective:          Chief Complaint: Dorothy Jesus is a 44 y.o. female who had concerns including Disease Management.    HPI:    Seronegative Rheumatoid/ Undifferentiated connective tissue disease comorbid chronic fatigue.   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 No cardiac manifestations.     Positive LENA , elevation in her CRP normal sedimentation rate but did have benefit on prednisone.    trial of prednisone did show significant improvement in her overall symptomst   When she comes off prednisone everything seems to regress at her baseline. No rashes, some pain with respirations, no fevers but subjectively, fatigue in sun no rash.     Patient with hand and ankle pain as of last few weeks but also more widespread MSK complaints and fatigue as well. Using Prednisone with modest improvement.   Patient with increased and persistent fatigue since her last COVID infection- she is having increased fatigue sleeping well, no known LEIGH.   ESR/ CRP- WNL  slight rishe last week.   She has a slight elevation of WBC- no c/o illness/UTI sx.     9/2024: patient stopped prednisone 5mg daily which she was using for years most days, she attributed feeling better with feeling less fatigued but never noted as much pain relief. Since stopping no worse with regard to her joints, no greater benefit with her fatigue.   She has adjusted hydroxyzine timing which is giving her significantly better days with less fatigue.   Patient suspect to have POTS and she already has hx of BJHS so this is fitting. She is having more tachycardic episodes with exertion. She has a long hx with tachycardia which was the reason to start metoprolol.     Rapid  3   5/2022: 5.44  11/1/22: 3.89  3/2023: 5.44  9/2023: 3.55  1/2024: 4.0  5/2024: 5.44  9/2024: 4.5 Off pred x 3 months and actually better.        TB/Hepatitis updated 2/2021 negative.     Enbrel (4/2021)  Prednisone still 5mg down to 6/7 days weekly as of recent, she was down to 3  "times weeks previously.   Tizanidine 4mg at HS :  helps with back and upper trapezius.         She failed HCQ with no response  MTX made her ill/nausea.  SSZ 2gm no benefit.   Tylenol as not helpful  Ibuprofen PRN more for menstrual cramps never found helpful for joints.   Lost efficacy with Humira DC 3/2021    FMS:     She has increased her activity level trying to eat healthier.  In the past she states she was getting some relief of myalgias and arthralgias on ketoprofen.  He completed the vitamin D 12 weeks her psychiatrist has recently adjusted her medications slightly and this seems to be helping a great deal.  Interestingly she noted with the adjustment in her ADHD meds her myalgias have improved.Patient is a 34 y/o female referred for +LENA  1:80 homogeneous with repeat serologies + 1:320 negative LENA profile.  with myalgias, brain fog. Present for few years worse in past 2 years, affecting daily living/function. Patient did suffer from worsening of her symptoms after trying gabapentin, she did increase dose as we discussed but this only exacerbated her complaints. She did have worseing depression through the winter. Recent job did not work out for her, which was frustrating.  did recently get full time job.    Exacerbates with menstrual cycles. Patient has a non-restorative sleep pattern, no snoring. She has had sleep study-[place] negative for narcoplepsy. Patient was given Provigil exacerbated anxiety.    Recently with cognitive impairment, ++migraines (unable to see Dr. Yao at this time, cannot drive to [place]), extreme fatigue, dizzyness. She is more forgetful that typical.   Trying cataflam PRN migraine.      Patient was on lamictal for nearly 8 years d/c'd for suspected myalgias. Recently (few weeks ago) trialed lithium did not tolerate. Zyprexa, Geodon, topamax and risperdol. Wellbutrin has been the best medication for her as she continues to feel "human". She is having more myalgias, and some " arthralgias some good days some bad days. She cannot see a pattern of morning stiffness.     Sister with UC  Mom recent dx RA.   Component      Latest Ref Rng & Units 4/13/2017 12/10/2015   Anti Sm Antibody      0.00 - 19.99 EU 0.35 0.43   Anti-Sm Interpretation      Negative Negative Negative   Anti-SSA Antibody      0.00 - 19.99 EU 0.97 1.62   Anti-SSA Interpretation      Negative Negative Negative   Anti-SSB Antibody      0.00 - 19.99 EU 0.35 0.00   Anti-SSB Interpretation      Negative Negative Negative   ds DNA Ab      Negative 1:10 Negative 1:10 Negative 1:10   Anti Sm/RNP Antibody      0.00 - 19.99 EU 0.79 0.86   Anti-Sm/RNP Interpretation      Negative Negative Negative   Complement (C-3)      50 - 180 mg/dL  108   Complement (C-4)      11 - 44 mg/dL  27   CPK      20 - 180 U/L  50   LENA Screen      Negative <1:160 Positive (A)    LENA HEP-2 Titer       Positive 1:320 Homogeneous        REVIEW OF SYSTEMS:    Review of Systems   Constitutional:  Positive for malaise/fatigue. Negative for fever and weight loss.   HENT:  Negative for sore throat.    Eyes:  Negative for double vision, photophobia and redness.   Respiratory:  Negative for cough, shortness of breath and wheezing.    Cardiovascular:  Negative for chest pain, palpitations and orthopnea.   Gastrointestinal:  Negative for abdominal pain, constipation and diarrhea.   Genitourinary:  Negative for dysuria, hematuria and urgency.   Musculoskeletal:  Positive for joint pain and neck pain. Negative for back pain and myalgias.   Skin:  Negative for rash.   Neurological:  Negative for dizziness, tingling, focal weakness and headaches.   Endo/Heme/Allergies:  Does not bruise/bleed easily.   Psychiatric/Behavioral:  Negative for depression, hallucinations and suicidal ideas.                Objective:            Past Medical History:   Diagnosis Date    Anxiety     Bartholin's cyst     Bipolar 2 disorder     Chronic fatigue     Osteopenia      Family History    Problem Relation Name Age of Onset    Breast cancer Maternal Grandmother       Social History     Tobacco Use    Smoking status: Never    Smokeless tobacco: Never   Substance Use Topics    Alcohol use: No     Comment: rare    Drug use: No         Current Outpatient Medications on File Prior to Visit   Medication Sig Dispense Refill    buPROPion (WELLBUTRIN XL) 300 MG 24 hr tablet Take 300 mg by mouth once daily.       clonazePAM (KLONOPIN) 0.5 MG tablet       dextroamphetamine-amphetamine (ADDERALL XR) 20 MG 24 hr capsule Take 20 mg by mouth every morning.       ENBRACE HR 1.5 mg iron- 8.73 mg-6.4 mg capsule TAKE ONE CAPSULE BY MOUTH ONCE DAILY 90 capsule 1    etanercept (ENBREL SURECLICK) 50 mg/mL (1 mL) Inject 1 mL (50 mg total) into the skin once a week. 4 mL 11    meloxicam (MOBIC) 15 MG tablet Take 1 tablet (15 mg total) by mouth once daily. 30 tablet 6    metoprolol succinate (TOPROL-XL) 25 MG 24 hr tablet Take 1 tablet (25 mg total) by mouth once daily. 90 tablet 2    sertraline (ZOLOFT) 100 MG tablet Take 100 mg by mouth once daily.      tiZANidine (ZANAFLEX) 4 MG tablet TAKE ONE TABLET (4MG) BY MOUTH TWO TIMES A DAY AS NEEDED 60 tablet 6    EPINEPHrine (EPIPEN) 0.3 mg/0.3 mL AtIn Inject 0.6 mLs (0.6 mg total) into the muscle once. for 1 dose 2 each 1    fluticasone propionate (FLONASE) 50 mcg/actuation nasal spray SPRAY ONE SPRAY BY EACH NOSTRIL ROUTE ONCE DAILY (Patient not taking: Reported on 2/4/2025) 32 g 2    hydrOXYzine pamoate (VISTARIL) 50 MG Cap Take 50 mg by mouth daily as needed. (Patient not taking: Reported on 2/4/2025)      omeprazole (PRILOSEC) 20 MG capsule Take 1 capsule (20 mg total) by mouth once daily. 30 capsule 11    predniSONE (DELTASONE) 5 MG tablet Take 5 mg by mouth once daily. (Patient not taking: Reported on 2/4/2025)      traZODone (DESYREL) 50 MG tablet Take 50 mg by mouth nightly as needed. (Patient not taking: Reported on 2/4/2025)       No current facility-administered  medications on file prior to visit.       Vitals:    02/04/25 1600   BP: 126/86   Pulse: 102       Physical Exam:    Physical Exam  Constitutional:       Appearance: She is well-developed.   HENT:      Head: Normocephalic and atraumatic.   Eyes:      General: Lids are normal.      Pupils: Pupils are equal, round, and reactive to light.   Cardiovascular:      Rate and Rhythm: Normal rate and regular rhythm.      Heart sounds: Normal heart sounds.   Pulmonary:      Effort: Pulmonary effort is normal.      Breath sounds: Normal breath sounds.   Musculoskeletal:      Right shoulder: Tenderness present. No swelling. Normal range of motion.      Left shoulder: Tenderness present. No swelling. Normal range of motion.      Right elbow: No swelling. Normal range of motion. No tenderness.      Left elbow: No swelling. Normal range of motion. No tenderness.      Right wrist: Tenderness present. No swelling. Normal range of motion.      Left wrist: Tenderness present. No swelling. Normal range of motion.      Right hand: Tenderness present. No swelling. Normal range of motion.      Left hand: Tenderness present. No swelling. Normal range of motion.      Cervical back: Normal range of motion.      Right knee: No swelling. Normal range of motion. No tenderness.      Left knee: No swelling. Normal range of motion. No tenderness.      Right foot: Normal range of motion. No swelling or tenderness.      Left foot: Normal range of motion. No swelling or tenderness.      Comments: 18 tender points examined in nine pairs: Posterior occiput, bilateral trapezius, bilateral supraspinatus, bilateral gluteal muscles, bilateral low cervical neck, bilateral second rib, bilateral lateral epicondyles, bilateral greater trochanters, bilateral medial knees. 16 Of 18 points examined were positive for tenderness.      MCP and PIP tenderness with full but painful finger curl.      Skin:     General: Skin is warm and dry.   Neurological:      Mental  Status: She is alert and oriented to person, place, and time.   Psychiatric:         Behavior: Behavior normal.         Thought Content: Thought content normal.         Assessment:       Encounter Diagnoses   Name Primary?    Seronegative rheumatoid arthritis Yes    Myalgic encephalomyelitis/chronic fatigue syndrome (ME/CFS)     Fibromyalgia     High risk medications (not anticoagulants) long-term use     Immunosuppression due to drug therapy            Plan:        Seronegative rheumatoid arthritis  -     ALT (SGPT); Future; Expected date: 02/04/2025  -     AST (SGOT); Future; Expected date: 02/04/2025  -     CBC Auto Differential; Future; Expected date: 02/04/2025  -     C-Reactive Protein; Future; Expected date: 02/04/2025  -     Creatinine, Serum; Future; Expected date: 02/04/2025  -     Sedimentation rate; Future; Expected date: 02/04/2025    Myalgic encephalomyelitis/chronic fatigue syndrome (ME/CFS)  -     Ambulatory Referral/Consult to Physical/Occupational Therapy; Future; Expected date: 02/11/2025    Fibromyalgia  -     Ambulatory Referral/Consult to Physical/Occupational Therapy; Future; Expected date: 02/11/2025    High risk medications (not anticoagulants) long-term use  -     ALT (SGPT); Future; Expected date: 02/04/2025  -     AST (SGOT); Future; Expected date: 02/04/2025  -     CBC Auto Differential; Future; Expected date: 02/04/2025  -     C-Reactive Protein; Future; Expected date: 02/04/2025  -     Creatinine, Serum; Future; Expected date: 02/04/2025  -     Sedimentation rate; Future; Expected date: 02/04/2025    Immunosuppression due to drug therapy  -     ALT (SGPT); Future; Expected date: 02/04/2025  -     AST (SGOT); Future; Expected date: 02/04/2025  -     CBC Auto Differential; Future; Expected date: 02/04/2025  -     C-Reactive Protein; Future; Expected date: 02/04/2025  -     Creatinine, Serum; Future; Expected date: 02/04/2025  -     Sedimentation rate; Future; Expected date:  "02/04/2025      Continue w/  Enbrel 50mg sureclick. (start 4/2021)  Stay off Prednisone this is great.   Continue with Meloxicam- only using "sometimes" encourage to use more  Rapid 3--> 4.5       No response with HCQ>   Intolerant MTX  SSZ toleratws/ no significant improvement   - off.     Continue Tizanidine     Seven studies used variations of aerobic exercise therapy such as walking, swimming, cycling or dancing provided at mixed levels in terms of intensity of the aerobic exercise from very low to quite rigorous, whilst one study used anaerobic exercise.   No follow-ups on file.       40min consultation with greater than 50% of that time included Preparing to see the patient (review records, tests), Obtaining and/or reviewing separately obtained historical data, Performing a medically appropriate examination and/or evaluation , Ordering medications, tests, and/or procedures, Referring and communicating with other healthcare professionals , Documenting clinical information in the electronic or other health record and Independently interpreting results  (as warranted) & communicating results to the patient/family/caregiver. All questions answered.                              "

## 2025-03-03 ENCOUNTER — TELEPHONE (OUTPATIENT)
Dept: RHEUMATOLOGY | Facility: CLINIC | Age: 45
End: 2025-03-03
Payer: MEDICAID

## 2025-03-03 NOTE — TELEPHONE ENCOUNTER
"----- Message from Terese sent at 3/3/2025  9:18 AM CST -----  Regarding: NP PHYSICAL THERAPY EVAL  Good day to you:[facility]  received your order to get the attached patient scheduled for an evaluation. The patient has refused to the evaluation stating they do not want to do therapy at this time.We wanted you to be aware that your patient has not started therapy. Please have the patient call us at [phone] should they decide to schedule for therapy.Best regards,Terese "Ms. Crocker" AugustAtrium Health Union Westsuhail [phone] FAX: [phone]  "

## 2025-04-07 DIAGNOSIS — M06.00 SERONEGATIVE RHEUMATOID ARTHRITIS: ICD-10-CM

## 2025-04-08 RX ORDER — ETANERCEPT 50 MG/ML
50 SOLUTION SUBCUTANEOUS WEEKLY
Qty: 4 ML | Refills: 11 | Status: ACTIVE | OUTPATIENT
Start: 2025-04-08 | End: 2026-04-08

## 2025-04-14 DIAGNOSIS — K29.70 GASTRITIS WITHOUT BLEEDING, UNSPECIFIED CHRONICITY, UNSPECIFIED GASTRITIS TYPE: ICD-10-CM

## 2025-04-15 RX ORDER — OMEPRAZOLE 20 MG/1
CAPSULE, DELAYED RELEASE ORAL
Qty: 30 CAPSULE | Refills: 11 | Status: SHIPPED | OUTPATIENT
Start: 2025-04-15

## 2025-05-03 ENCOUNTER — PATIENT MESSAGE (OUTPATIENT)
Dept: ADMINISTRATIVE | Facility: OTHER | Age: 45
End: 2025-05-03
Payer: MEDICAID

## 2025-05-17 DIAGNOSIS — M79.7 FIBROMYALGIA: ICD-10-CM

## 2025-05-17 DIAGNOSIS — M06.00 SERONEGATIVE RHEUMATOID ARTHRITIS: ICD-10-CM

## 2025-05-19 RX ORDER — TIZANIDINE 4 MG/1
4 TABLET ORAL 2 TIMES DAILY PRN
Qty: 60 TABLET | Refills: 6 | Status: SHIPPED | OUTPATIENT
Start: 2025-05-19